# Patient Record
Sex: FEMALE | Race: WHITE | Employment: FULL TIME | URBAN - METROPOLITAN AREA
[De-identification: names, ages, dates, MRNs, and addresses within clinical notes are randomized per-mention and may not be internally consistent; named-entity substitution may affect disease eponyms.]

---

## 2019-05-25 ENCOUNTER — APPOINTMENT (OUTPATIENT)
Dept: GENERAL RADIOLOGY | Age: 34
End: 2019-05-25
Payer: COMMERCIAL

## 2019-05-25 ENCOUNTER — APPOINTMENT (OUTPATIENT)
Dept: CT IMAGING | Age: 34
End: 2019-05-25
Payer: COMMERCIAL

## 2019-05-25 ENCOUNTER — HOSPITAL ENCOUNTER (OUTPATIENT)
Age: 34
Setting detail: OBSERVATION
Discharge: LEFT BEFORE DISCHARGE | End: 2019-05-26
Attending: EMERGENCY MEDICINE | Admitting: INTERNAL MEDICINE
Payer: COMMERCIAL

## 2019-05-25 DIAGNOSIS — R65.10 SIRS (SYSTEMIC INFLAMMATORY RESPONSE SYNDROME) (HCC): ICD-10-CM

## 2019-05-25 DIAGNOSIS — B34.9 VIRAL ILLNESS: Primary | ICD-10-CM

## 2019-05-25 PROBLEM — R11.0 NAUSEA: Status: ACTIVE | Noted: 2019-05-25

## 2019-05-25 PROBLEM — M79.10 MYALGIA: Status: ACTIVE | Noted: 2019-05-25

## 2019-05-25 PROBLEM — R50.9 FEVER AND CHILLS: Status: ACTIVE | Noted: 2019-05-25

## 2019-05-25 PROBLEM — M25.50 POLYARTHRALGIA: Status: ACTIVE | Noted: 2019-05-25

## 2019-05-25 PROBLEM — R51.9 HEADACHE: Status: ACTIVE | Noted: 2019-05-25

## 2019-05-25 LAB
A/G RATIO: 1.1 (ref 1.1–2.2)
ALBUMIN SERPL-MCNC: 4 G/DL (ref 3.4–5)
ALP BLD-CCNC: 96 U/L (ref 40–129)
ALT SERPL-CCNC: 104 U/L (ref 10–40)
AMPHETAMINE SCREEN, URINE: NORMAL
ANION GAP SERPL CALCULATED.3IONS-SCNC: 10 MMOL/L (ref 3–16)
AST SERPL-CCNC: 57 U/L (ref 15–37)
BARBITURATE SCREEN URINE: NORMAL
BASOPHILS ABSOLUTE: 0 K/UL (ref 0–0.2)
BASOPHILS RELATIVE PERCENT: 0.1 %
BENZODIAZEPINE SCREEN, URINE: NORMAL
BILIRUB SERPL-MCNC: 0.5 MG/DL (ref 0–1)
BILIRUBIN URINE: NEGATIVE
BLOOD, URINE: NEGATIVE
BUN BLDV-MCNC: 7 MG/DL (ref 7–20)
CALCIUM SERPL-MCNC: 9.3 MG/DL (ref 8.3–10.6)
CANNABINOID SCREEN URINE: NORMAL
CHLORIDE BLD-SCNC: 105 MMOL/L (ref 99–110)
CLARITY: ABNORMAL
CO2: 23 MMOL/L (ref 21–32)
COCAINE METABOLITE SCREEN URINE: NORMAL
COLOR: YELLOW
CREAT SERPL-MCNC: 0.7 MG/DL (ref 0.6–1.1)
EOSINOPHILS ABSOLUTE: 0 K/UL (ref 0–0.6)
EOSINOPHILS RELATIVE PERCENT: 0.5 %
EPITHELIAL CELLS, UA: 4 /HPF (ref 0–5)
GFR AFRICAN AMERICAN: >60
GFR NON-AFRICAN AMERICAN: >60
GLOBULIN: 3.5 G/DL
GLUCOSE BLD-MCNC: 82 MG/DL (ref 70–99)
GLUCOSE URINE: NEGATIVE MG/DL
HCT VFR BLD CALC: 42.3 % (ref 36–48)
HEMOGLOBIN: 14.3 G/DL (ref 12–16)
HYALINE CASTS: 2 /LPF (ref 0–8)
KETONES, URINE: NEGATIVE MG/DL
LACTIC ACID: 0.8 MMOL/L (ref 0.4–2)
LEUKOCYTE ESTERASE, URINE: ABNORMAL
LIPASE: 18 U/L (ref 13–60)
LYMPHOCYTES ABSOLUTE: 1.5 K/UL (ref 1–5.1)
LYMPHOCYTES RELATIVE PERCENT: 18.7 %
Lab: NORMAL
MCH RBC QN AUTO: 31 PG (ref 26–34)
MCHC RBC AUTO-ENTMCNC: 33.8 G/DL (ref 31–36)
MCV RBC AUTO: 91.9 FL (ref 80–100)
METHADONE SCREEN, URINE: NORMAL
MICROSCOPIC EXAMINATION: YES
MONO TEST: NEGATIVE
MONOCYTES ABSOLUTE: 0.8 K/UL (ref 0–1.3)
MONOCYTES RELATIVE PERCENT: 9.9 %
NEUTROPHILS ABSOLUTE: 5.7 K/UL (ref 1.7–7.7)
NEUTROPHILS RELATIVE PERCENT: 70.8 %
NITRITE, URINE: NEGATIVE
OPIATE SCREEN URINE: NORMAL
OXYCODONE URINE: NORMAL
PDW BLD-RTO: 13.1 % (ref 12.4–15.4)
PH UA: 8
PH UA: 8 (ref 5–8)
PHENCYCLIDINE SCREEN URINE: NORMAL
PLATELET # BLD: 156 K/UL (ref 135–450)
PMV BLD AUTO: 8.7 FL (ref 5–10.5)
POTASSIUM SERPL-SCNC: 3.6 MMOL/L (ref 3.5–5.1)
PROPOXYPHENE SCREEN: NORMAL
PROTEIN UA: NEGATIVE MG/DL
RAPID INFLUENZA  B AGN: NEGATIVE
RAPID INFLUENZA A AGN: NEGATIVE
RBC # BLD: 4.61 M/UL (ref 4–5.2)
RBC UA: 3 /HPF (ref 0–4)
SEDIMENTATION RATE, ERYTHROCYTE: 22 MM/HR (ref 0–20)
SODIUM BLD-SCNC: 138 MMOL/L (ref 136–145)
SPECIFIC GRAVITY UA: 1.01 (ref 1–1.03)
TOTAL PROTEIN: 7.5 G/DL (ref 6.4–8.2)
URINE REFLEX TO CULTURE: YES
URINE TYPE: ABNORMAL
UROBILINOGEN, URINE: 1 E.U./DL
WBC # BLD: 8.1 K/UL (ref 4–11)
WBC UA: 6 /HPF (ref 0–5)

## 2019-05-25 PROCEDURE — 87801 DETECT AGNT MULT DNA AMPLI: CPT

## 2019-05-25 PROCEDURE — 85025 COMPLETE CBC W/AUTO DIFF WBC: CPT

## 2019-05-25 PROCEDURE — 87086 URINE CULTURE/COLONY COUNT: CPT

## 2019-05-25 PROCEDURE — 6370000000 HC RX 637 (ALT 250 FOR IP): Performed by: INTERNAL MEDICINE

## 2019-05-25 PROCEDURE — 80053 COMPREHEN METABOLIC PANEL: CPT

## 2019-05-25 PROCEDURE — 6370000000 HC RX 637 (ALT 250 FOR IP): Performed by: PHYSICIAN ASSISTANT

## 2019-05-25 PROCEDURE — G0378 HOSPITAL OBSERVATION PER HR: HCPCS

## 2019-05-25 PROCEDURE — 6360000002 HC RX W HCPCS: Performed by: INTERNAL MEDICINE

## 2019-05-25 PROCEDURE — 87804 INFLUENZA ASSAY W/OPTIC: CPT

## 2019-05-25 PROCEDURE — 6360000002 HC RX W HCPCS: Performed by: EMERGENCY MEDICINE

## 2019-05-25 PROCEDURE — 87040 BLOOD CULTURE FOR BACTERIA: CPT

## 2019-05-25 PROCEDURE — 83690 ASSAY OF LIPASE: CPT

## 2019-05-25 PROCEDURE — 81001 URINALYSIS AUTO W/SCOPE: CPT

## 2019-05-25 PROCEDURE — 83605 ASSAY OF LACTIC ACID: CPT

## 2019-05-25 PROCEDURE — 2580000003 HC RX 258: Performed by: INTERNAL MEDICINE

## 2019-05-25 PROCEDURE — 71046 X-RAY EXAM CHEST 2 VIEWS: CPT

## 2019-05-25 PROCEDURE — 96361 HYDRATE IV INFUSION ADD-ON: CPT

## 2019-05-25 PROCEDURE — 96374 THER/PROPH/DIAG INJ IV PUSH: CPT

## 2019-05-25 PROCEDURE — 80307 DRUG TEST PRSMV CHEM ANLYZR: CPT

## 2019-05-25 PROCEDURE — 86308 HETEROPHILE ANTIBODY SCREEN: CPT

## 2019-05-25 PROCEDURE — 96375 TX/PRO/DX INJ NEW DRUG ADDON: CPT

## 2019-05-25 PROCEDURE — 70450 CT HEAD/BRAIN W/O DYE: CPT

## 2019-05-25 PROCEDURE — 84145 PROCALCITONIN (PCT): CPT

## 2019-05-25 PROCEDURE — 85652 RBC SED RATE AUTOMATED: CPT

## 2019-05-25 PROCEDURE — 99285 EMERGENCY DEPT VISIT HI MDM: CPT

## 2019-05-25 PROCEDURE — 2580000003 HC RX 258: Performed by: PHYSICIAN ASSISTANT

## 2019-05-25 PROCEDURE — 96372 THER/PROPH/DIAG INJ SC/IM: CPT

## 2019-05-25 PROCEDURE — 86140 C-REACTIVE PROTEIN: CPT

## 2019-05-25 PROCEDURE — 87077 CULTURE AEROBIC IDENTIFY: CPT

## 2019-05-25 PROCEDURE — 6360000002 HC RX W HCPCS: Performed by: PHYSICIAN ASSISTANT

## 2019-05-25 RX ORDER — HYDROCODONE BITARTRATE AND ACETAMINOPHEN 5; 325 MG/1; MG/1
1 TABLET ORAL EVERY 6 HOURS PRN
Status: DISCONTINUED | OUTPATIENT
Start: 2019-05-25 | End: 2019-05-26 | Stop reason: HOSPADM

## 2019-05-25 RX ORDER — KETOROLAC TROMETHAMINE 30 MG/ML
30 INJECTION, SOLUTION INTRAMUSCULAR; INTRAVENOUS EVERY 6 HOURS PRN
Status: DISCONTINUED | OUTPATIENT
Start: 2019-05-25 | End: 2019-05-26 | Stop reason: HOSPADM

## 2019-05-25 RX ORDER — 0.9 % SODIUM CHLORIDE 0.9 %
1000 INTRAVENOUS SOLUTION INTRAVENOUS ONCE
Status: COMPLETED | OUTPATIENT
Start: 2019-05-25 | End: 2019-05-25

## 2019-05-25 RX ORDER — SODIUM CHLORIDE 0.9 % (FLUSH) 0.9 %
10 SYRINGE (ML) INJECTION EVERY 12 HOURS SCHEDULED
Status: DISCONTINUED | OUTPATIENT
Start: 2019-05-25 | End: 2019-05-26 | Stop reason: HOSPADM

## 2019-05-25 RX ORDER — SODIUM CHLORIDE 9 MG/ML
INJECTION, SOLUTION INTRAVENOUS CONTINUOUS
Status: DISCONTINUED | OUTPATIENT
Start: 2019-05-25 | End: 2019-05-26 | Stop reason: HOSPADM

## 2019-05-25 RX ORDER — LIDOCAINE HYDROCHLORIDE 10 MG/ML
INJECTION, SOLUTION EPIDURAL; INFILTRATION; INTRACAUDAL; PERINEURAL
Status: DISPENSED
Start: 2019-05-25 | End: 2019-05-26

## 2019-05-25 RX ORDER — LORAZEPAM 2 MG/ML
2 INJECTION INTRAMUSCULAR ONCE
Status: COMPLETED | OUTPATIENT
Start: 2019-05-25 | End: 2019-05-25

## 2019-05-25 RX ORDER — CEPHALEXIN 500 MG/1
500 CAPSULE ORAL 4 TIMES DAILY
COMMUNITY
End: 2019-05-25

## 2019-05-25 RX ORDER — KETOROLAC TROMETHAMINE 30 MG/ML
30 INJECTION, SOLUTION INTRAMUSCULAR; INTRAVENOUS ONCE
Status: COMPLETED | OUTPATIENT
Start: 2019-05-25 | End: 2019-05-25

## 2019-05-25 RX ORDER — ACETAMINOPHEN 500 MG
500 TABLET ORAL ONCE
Status: COMPLETED | OUTPATIENT
Start: 2019-05-25 | End: 2019-05-25

## 2019-05-25 RX ORDER — SODIUM CHLORIDE 0.9 % (FLUSH) 0.9 %
10 SYRINGE (ML) INJECTION PRN
Status: DISCONTINUED | OUTPATIENT
Start: 2019-05-25 | End: 2019-05-26 | Stop reason: HOSPADM

## 2019-05-25 RX ORDER — ONDANSETRON 2 MG/ML
4 INJECTION INTRAMUSCULAR; INTRAVENOUS EVERY 6 HOURS PRN
Status: DISCONTINUED | OUTPATIENT
Start: 2019-05-25 | End: 2019-05-26 | Stop reason: HOSPADM

## 2019-05-25 RX ORDER — ACETAMINOPHEN 325 MG/1
650 TABLET ORAL EVERY 4 HOURS PRN
Status: DISCONTINUED | OUTPATIENT
Start: 2019-05-25 | End: 2019-05-26 | Stop reason: HOSPADM

## 2019-05-25 RX ADMIN — SODIUM CHLORIDE: 9 INJECTION, SOLUTION INTRAVENOUS at 20:40

## 2019-05-25 RX ADMIN — HYDROCODONE BITARTRATE AND ACETAMINOPHEN 1 TABLET: 5; 325 TABLET ORAL at 22:06

## 2019-05-25 RX ADMIN — LORAZEPAM 2 MG: 2 INJECTION INTRAMUSCULAR; INTRAVENOUS at 17:53

## 2019-05-25 RX ADMIN — Medication 10 ML: at 20:29

## 2019-05-25 RX ADMIN — ACETAMINOPHEN 500 MG: 500 TABLET ORAL at 14:38

## 2019-05-25 RX ADMIN — SODIUM CHLORIDE 1000 ML: 9 INJECTION, SOLUTION INTRAVENOUS at 14:38

## 2019-05-25 RX ADMIN — SODIUM CHLORIDE 1000 ML: 9 INJECTION, SOLUTION INTRAVENOUS at 16:03

## 2019-05-25 RX ADMIN — ENOXAPARIN SODIUM 40 MG: 40 INJECTION SUBCUTANEOUS at 20:40

## 2019-05-25 RX ADMIN — KETOROLAC TROMETHAMINE 30 MG: 30 INJECTION, SOLUTION INTRAMUSCULAR at 16:03

## 2019-05-25 ASSESSMENT — PAIN DESCRIPTION - PAIN TYPE
TYPE: ACUTE PAIN
TYPE: ACUTE PAIN

## 2019-05-25 ASSESSMENT — ENCOUNTER SYMPTOMS
BLOOD IN STOOL: 0
ANAL BLEEDING: 0
BACK PAIN: 0
EYE ITCHING: 0
ABDOMINAL PAIN: 0
COUGH: 0
RECTAL PAIN: 0
EYE PAIN: 0
SHORTNESS OF BREATH: 0
VOMITING: 0
PHOTOPHOBIA: 0
CONSTIPATION: 0
ABDOMINAL DISTENTION: 0
EYE REDNESS: 0
EYE DISCHARGE: 0
STRIDOR: 0
DIARRHEA: 0
COLOR CHANGE: 0
NAUSEA: 1
ALLERGIC/IMMUNOLOGIC NEGATIVE: 1
WHEEZING: 0

## 2019-05-25 ASSESSMENT — PAIN DESCRIPTION - DESCRIPTORS: DESCRIPTORS: ACHING

## 2019-05-25 ASSESSMENT — PAIN SCALES - GENERAL
PAINLEVEL_OUTOF10: 8
PAINLEVEL_OUTOF10: 7
PAINLEVEL_OUTOF10: 7

## 2019-05-25 ASSESSMENT — PAIN DESCRIPTION - LOCATION: LOCATION: GENERALIZED

## 2019-05-25 NOTE — ED PROVIDER NOTES
m) 170 lb (77.1 kg)       Physical Exam   Constitutional: She is oriented to person, place, and time. She appears well-developed and well-nourished. No distress. HENT:   Head: Normocephalic and atraumatic. Right Ear: Hearing, tympanic membrane, external ear and ear canal normal.   Left Ear: Hearing, tympanic membrane, external ear and ear canal normal.   Nose: Nose normal.   Mouth/Throat: Oropharynx is clear and moist.   No temporal tenderness or pulsatile mass   Eyes: Pupils are equal, round, and reactive to light. Conjunctivae and EOM are normal. Right eye exhibits no discharge. Left eye exhibits no discharge. No scleral icterus. Neck: Normal range of motion. No JVD present. No Brudzinski's sign and no Kernig's sign noted. Cardiovascular: Normal rate. Pulmonary/Chest: Effort normal and breath sounds normal.   Abdominal: Soft. Bowel sounds are normal. She exhibits no distension, no abdominal bruit and no pulsatile midline mass. There is no tenderness. There is no rigidity, no rebound, no guarding, no CVA tenderness, no tenderness at McBurney's point and negative Schaffer's sign. Musculoskeletal: Normal range of motion. No extremity edema, posterior calf or thigh tenderness, palpable cord, discoloration. Negative homans. Lymphadenopathy:     She has no cervical adenopathy. Neurological: She is alert and oriented to person, place, and time. No cranial nerve deficit (II-XII Intact). Skin: Skin is warm and dry. Capillary refill takes less than 2 seconds. No rash noted. She is not diaphoretic. No erythema. No pallor. Psychiatric: She has a normal mood and affect. Her behavior is normal.   Nursing note and vitals reviewed.       DIAGNOSTIC RESULTS   LABS:    Labs Reviewed   COMPREHENSIVE METABOLIC PANEL - Abnormal; Notable for the following components:       Result Value     (*)     AST 57 (*)     All other components within normal limits    Narrative:     Performed at:  [facility]) - criteria. My suspicion is low for ACS, PE, myocarditis, pericarditis, endocarditis, acute pulmonary edema, pleural effusion, pericardial effusion, cardiac tamponade, cardiomyopathy, CHF exacerbation, thoracic aortic dissection, esophageal rupture, other life-threatening arrhythmia, hypertensive urgency or emergency, hemothorax, pulmonary contusion, subcutaneous emphysema, flail chest, pneumo mediastinum, rib fracture, pneumonia, pneumothorax, fracture or dislocation, epidural abscess or hematoma, discitis, meningitis, encephalitis, transverse myelitis, carotid dissection, sinus abscess, acute fracture, acute CVA, ICH, SAH, TIA,  encephalitis, pseudotumor cerebri, temporal arteritis, sentinel bleed from ruptured aneurysm, hypertensive urgency or emergency, subdural hematoma, epidural hematoma acute pharyngitis, peritonsillar or tonsillar abscess, retropharyngeal abscess, bacterial tracheitis, epiglottitis, meningitis, encephalitis, foreign body, angioedema, anaphylaxis, mononucleosis, mumps, lymphoma, leukemia, asthma exacerbation, osteomyelitis, mastoiditis, severe sepsis or shock, DKA, or other concerning pathology. At this time cant entirely rule out meningitis. We have high suspicion for potential viral illness. FINAL IMPRESSION      1. Viral illness    2. SIRS (systemic inflammatory response syndrome) ([facility] [address]          DISPOSITION/PLAN   DISPOSITION Admitted 05/25/2019 05:54:49 PM      PATIENT REFERREDTO:  No follow-up provider specified.     DISCHARGE MEDICATIONS:  New Prescriptions    No medications on file       DISCONTINUED MEDICATIONS:  Discontinued Medications    CEPHALEXIN (KEFLEX) 500 MG CAPSULE    Take 500 mg by mouth 4 times daily    IBUPROFEN (ADVIL;MOTRIN) 800 MG TABLET    Take 1 tablet by mouth every 8 hours as needed for Pain              (Please note that portions ofthis note were completed with a voice recognition program.  Efforts were made to edit the dictations but occasionally words are mis-transcribed.)    Maryse Dewey PA-C (electronically signed)           Maryse Dewey PA-C  05/25/19 [id number]

## 2019-05-25 NOTE — H&P
Hospital Medicine History & Physical      PCP: No primary care provider on file. Date of Admission: 5/25/2019    Date of Service: Pt seen/examined on 5/25/2019  and Placed in Observation. Chief Complaint:    Chief Complaint   Patient presents with    Fatigue     pt brought in by [facility] c/o burning and pain all over since yesterday around noon. Seen at [facility] and was told she has UTI. History Of Present Illness: The patient is a 35 y.o. female with remote history of drug abuse but has been clean for 6 years, history of hepatitis C with mildly abnormal LFTs, who has been well until yesterday when she developed rapid onset of fevers with chills and rigors and associated myalgias with generalized body aches, mild neck pain with stiffness, no photophobia, no rash, no nausea or vomiting. She was seen at [facility] ER and diagnosed with UTI started on Keflex. However symptoms persisted has presentation here today. On presentation to the ER she had a fever of 101 and was given Tylenol with Toradol with further spiking a fever to 103. She remained tachycardic at 108. She received 2 L of IV fluids with no improvement in her tachycardia. She denies any sore throats, no photophobia, no cough or production, no abdominal pains or diarrhea, no dysuria or hematuria. She reports to be in a monogamous relationship. She denies any IV drug use in the last 6 years. This x-ray is clear with no focal pathology and so is a CT of the brain. Past Medical History:        Diagnosis Date    Hepatitis C     asymptomatic    IV drug abuse ([facility] [address]     Ovarian cyst        Past Surgical History:        Procedure Laterality Date    LAPAROSCOPY  2/10/16    diagnostic laparoscopy, KTP laser of endometriosis     TUBAL LIGATION         Medications Prior to Admission:    Prior to Admission medications    Medication Sig Start Date End Date Taking?  Authorizing Provider   LITHIUM PO Take by mouth Historical Provider, MD   Divalproex Sodium (DEPAKOTE PO) Take by mouth    Historical Provider, MD   TRAZODONE HCL PO Take by mouth    Historical Provider, MD   naproxen (NAPROSYN) 500 MG tablet Take 1 tablet by mouth 2 times daily for 20 doses 6/21/18 7/1/18  SEGUNDO Zuniga   ibuprofen (ADVIL;MOTRIN) 600 MG tablet Take 1 tablet by mouth every 8 hours as needed for Pain for 20 doses. 8/12/10 8/12/11  Negro Loera MD   ibuprofen (ADVIL;MOTRIN) 600 MG tablet Take 1 tablet by mouth every 8 hours as needed for Pain for 20 doses. 6/6/10 6/6/11  Roxann Dooley MD       Allergies:  Penicillins    Social History:  The patient currently lives with boyfriend    TOBACCO:   reports that she quit smoking about 3 years ago. She smoked 0.50 packs per day. She has never used smokeless tobacco.  ETOH:   reports that she does not drink alcohol. Family History:  Reviewed in detail and negative for DM, Early CAD, Cancer, CVA. Positive as follows:        Problem Relation Age of Onset    Substance Abuse Mother     Mental Illness Mother        REVIEW OF SYSTEMS:   Positive for fevers, chills, myalgia, generalized body aches, mild neck pain, and as noted in the HPI. All other systems reviewed and negative. PHYSICAL EXAM:    /78   Pulse 108   Temp 101.3 °F (38.5 °C) (Oral)   Resp 18   Ht 5' 3\" (1.6 m)   Wt 170 lb (77.1 kg)   LMP 05/01/2019   SpO2 100%   BMI 30.11 kg/m²     General appearance: No apparent distress appears stated age and cooperative. HEENT Normal cephalic, atraumatic without obvious deformity. Pupils equal, round, and reactive to light. Extra ocular muscles intact. Conjunctivae/corneas clear. Neck: Supple, No jugular venous distention/bruits. No thyromegaly or adenopathy  Lungs: Clear to auscultation, bilaterally without Rales/Wheezes/Rhonchi with good respiratory effort.   Heart: Regular rate and rhythm with Normal S1/S2 without murmurs, rubs or gallops  Abdomen: Soft, non-tender or non-distended without rigidity or guarding and positive bowel sounds  Extremities: No clubbing, cyanosis, or edema bilaterally. Skin: Skin color, texture, turgor normal.  No rashes or lesions. Neurologic: Alert and oriented X 3, neurovascularly intact with sensory/motor intact upper extremities/lower extremities, bilaterally. Cranial nerves: II-XII intact, grossly non-focal.  Negative Kernig sign  Mental status: Alert, oriented, thought content appropriate.     CXR:  I have reviewed the CXR with the following interpretation: clear lung fields  CT brain: No acute intracranial pathology      CBC   Recent Labs     05/25/19  1439   WBC 8.1   HGB 14.3   HCT 42.3         RENAL  Recent Labs     05/25/19  1440      K 3.6      CO2 23   BUN 7   CREATININE 0.7     LFT'S  Recent Labs     05/25/19  1440   AST 57*   *   BILITOT 0.5   ALKPHOS 96     U/A:    Lab Results   Component Value Date    COLORU YELLOW 05/25/2019    WBCUA 6 05/25/2019    RBCUA 3 05/25/2019    MUCUS 1+ 07/01/2012    BACTERIA 1+ 07/01/2012    CLARITYU CLOUDY 05/25/2019    SPECGRAV 1.014 05/25/2019    LEUKOCYTESUR TRACE 05/25/2019    BLOODU Negative 05/25/2019    GLUCOSEU Negative 05/25/2019    GLUCOSEU NEGATIVE 05/29/2012         Active Hospital Problems    Diagnosis Date Noted    SIRS (systemic inflammatory response syndrome) (HCC) [R65.10] 05/25/2019    Probable Acute Viral illness [B34.9] 05/25/2019    Myalgia [M79.10] 05/25/2019    Polyarthralgia [M25.50] 05/25/2019    Fever and chills [R50.9] 05/25/2019    Nausea [R11.0] 05/25/2019    Headache [R51] 05/25/2019         ASSESSMENT/PLAN:  35 y.o. female with remote history of drug abuse but has been clean for 6 years, history of hepatitis C with mildly abnormal LFTs, who has been well until yesterday when she developed rapid onset of fevers with chills and rigors and associated myalgias with generalized body aches, mild neck pain, headache likely due to a viral illness

## 2019-05-25 NOTE — ED PROVIDER NOTES
I independently examined and evaluated John Alarcon. In brief their history revealed patient with myalgias, fever, chills. This all started yesterday. She was seen at an outside facility and diagnosed with urinary tract infection. She states she does not believe she has a urinary tract infection and she has no symptoms of 1. She denies specifically chest pain or shortness of breath but does state she is having pain all over. She does complain of a slight headache and neck pain. Their exam revealed abdomen soft, nontender nondistended. Lungs clear auscultation bilaterally. Full range of motion of the neck. No meningismal symptoms. Cranial nerves II through XII grossly intact. All diagnostic, treatment, and disposition decisions were made by myself in conjunction with the mid-level provider. Before disposition, questions were sought and answered with the patient. They have voiced understanding and agree with the plan. The patient initially presented her temperature was 101.3. After Tylenol and Toradol her temperature is actually gone up to 103. She is now tachycardia to 108. She has been given 2 L of IV fluid. Her workup at this time including head CT, chest x-ray, flu swab and blood work is all negative. Her urinalysis does not show a large sign of infection. Given the patient's headache and neck pain we did discuss a possible LP to rule out meningitis but the patient refuses. She tells me that the neck pain is not as significant as her body aches. Patient meets SIRS criteria. She is requesting admission. I have reached out to the hospitalist for evaluation and possible admission    Addendum: After patient discussed with hospitalist she did agree to an LP. I did give her 2mg of IV ativan prior to procedure. I attempted this at bedside but was unsuccessful.  I believe actually that I had the needle in the correct space but then the patient said she was going to vomit and she started to sit up so I aborted the procedure. We did relay this information to the hospitalist    The risks (including but not limited to pain, bleeding, infection and post-procedure headache) and benefits of lumbar puncture were discussed with the patient. Questions were sought and answered and consent was obtained for the procedure. Keaton Mari was placed in a seated position and leaned over a bedside table. The lumbar region was then prepped and draped in standard bedside fashion. The L3 interspace was identified with physical landmarks. The overlying skin and interspace was anesthetized with 3ml of Lidocaine 1% without epinephrine. A 20gauge spinal needle was inserted but was unsuccessful The patient tolerated the procedure well without complications and my repeat neurovascular exam post-procedure is unchanged. For all further details of the patient's emergency department visit, please see the mid-level practitioner's documentation.         Rajesh Michael MD  05/25/19 [address] [place] [address] Timothy Fermin MD  05/25/19 [id number]

## 2019-05-25 NOTE — ED NOTES
Bed: 11  Expected date:   Expected time:   Means of arrival:   Comments:  EMS     Kareen Quinteros RN  05/25/19 0458

## 2019-05-26 VITALS
RESPIRATION RATE: 16 BRPM | HEART RATE: 79 BPM | TEMPERATURE: 98 F | BODY MASS INDEX: 30.12 KG/M2 | WEIGHT: 170 LBS | OXYGEN SATURATION: 98 % | HEIGHT: 63 IN | DIASTOLIC BLOOD PRESSURE: 57 MMHG | SYSTOLIC BLOOD PRESSURE: 92 MMHG

## 2019-05-26 LAB
ANION GAP SERPL CALCULATED.3IONS-SCNC: 11 MMOL/L (ref 3–16)
BASOPHILS ABSOLUTE: 0 K/UL (ref 0–0.2)
BASOPHILS RELATIVE PERCENT: 0.1 %
BUN BLDV-MCNC: 8 MG/DL (ref 7–20)
C-REACTIVE PROTEIN: 29.7 MG/L (ref 0–5.1)
CALCIUM SERPL-MCNC: 8.2 MG/DL (ref 8.3–10.6)
CHLORIDE BLD-SCNC: 112 MMOL/L (ref 99–110)
CO2: 19 MMOL/L (ref 21–32)
CREAT SERPL-MCNC: <0.5 MG/DL (ref 0.6–1.1)
EOSINOPHILS ABSOLUTE: 0.1 K/UL (ref 0–0.6)
EOSINOPHILS RELATIVE PERCENT: 1.4 %
GFR AFRICAN AMERICAN: >60
GFR NON-AFRICAN AMERICAN: >60
GLUCOSE BLD-MCNC: 97 MG/DL (ref 70–99)
HCT VFR BLD CALC: 35.7 % (ref 36–48)
HEMOGLOBIN: 12.1 G/DL (ref 12–16)
LACTIC ACID: 0.8 MMOL/L (ref 0.4–2)
LYMPHOCYTES ABSOLUTE: 2.2 K/UL (ref 1–5.1)
LYMPHOCYTES RELATIVE PERCENT: 30.5 %
MAGNESIUM: 1.9 MG/DL (ref 1.8–2.4)
MCH RBC QN AUTO: 30.6 PG (ref 26–34)
MCHC RBC AUTO-ENTMCNC: 33.9 G/DL (ref 31–36)
MCV RBC AUTO: 90.3 FL (ref 80–100)
MONOCYTES ABSOLUTE: 0.8 K/UL (ref 0–1.3)
MONOCYTES RELATIVE PERCENT: 11.8 %
NEUTROPHILS ABSOLUTE: 4.1 K/UL (ref 1.7–7.7)
NEUTROPHILS RELATIVE PERCENT: 56.2 %
PDW BLD-RTO: 12.9 % (ref 12.4–15.4)
PLATELET # BLD: 135 K/UL (ref 135–450)
PMV BLD AUTO: 8.4 FL (ref 5–10.5)
POTASSIUM SERPL-SCNC: 3.9 MMOL/L (ref 3.5–5.1)
PROCALCITONIN: 0.15 NG/ML (ref 0–0.15)
RBC # BLD: 3.95 M/UL (ref 4–5.2)
SODIUM BLD-SCNC: 142 MMOL/L (ref 136–145)
URINE CULTURE, ROUTINE: NORMAL
WBC # BLD: 7.2 K/UL (ref 4–11)

## 2019-05-26 PROCEDURE — 83605 ASSAY OF LACTIC ACID: CPT

## 2019-05-26 PROCEDURE — 83735 ASSAY OF MAGNESIUM: CPT

## 2019-05-26 PROCEDURE — 96376 TX/PRO/DX INJ SAME DRUG ADON: CPT

## 2019-05-26 PROCEDURE — 36415 COLL VENOUS BLD VENIPUNCTURE: CPT

## 2019-05-26 PROCEDURE — 6360000002 HC RX W HCPCS: Performed by: INTERNAL MEDICINE

## 2019-05-26 PROCEDURE — 2580000003 HC RX 258: Performed by: INTERNAL MEDICINE

## 2019-05-26 PROCEDURE — 80048 BASIC METABOLIC PNL TOTAL CA: CPT

## 2019-05-26 PROCEDURE — 85025 COMPLETE CBC W/AUTO DIFF WBC: CPT

## 2019-05-26 PROCEDURE — G0378 HOSPITAL OBSERVATION PER HR: HCPCS

## 2019-05-26 RX ADMIN — SODIUM CHLORIDE: 9 INJECTION, SOLUTION INTRAVENOUS at 05:49

## 2019-05-26 RX ADMIN — KETOROLAC TROMETHAMINE 30 MG: 30 INJECTION, SOLUTION INTRAMUSCULAR at 05:49

## 2019-05-26 ASSESSMENT — PAIN SCALES - GENERAL: PAINLEVEL_OUTOF10: 6

## 2019-05-26 NOTE — PROGRESS NOTES
Admission complete. Patient alert and oriented x4, c/o generalized body aches and pains not specific to any one region. PRN pain med given see eMAR, gave towels so patient could rinse legs in warm water in shower to alleviate \"restless leg pain. \"    FYI patient would like care team to know that she has aquatic turtles at home, and she handled a sick turtle 2 days ago prior to becoming ill. Per patient turtles are known carriers of salmonella among other things. The care plan and education has been reviewed and mutually agreed upon with the patient.      Electronically signed by Nikita Soto RN on 5/25/2019 at 11:10 PM

## 2019-05-26 NOTE — DISCHARGE SUMMARY
Hospital Medicine Discharge Summary    Patient ID: New Rosas      Patient's PCP: No primary care provider on file. Admit Date: 5/25/2019     Discharge Date: 5/26/2019      Admitting Physician: Carolyn Khan MD     Discharge Physician: Carolyn Khan MD     Discharge Diagnoses: Active Hospital Problems    Diagnosis    SIRS (systemic inflammatory response syndrome) (HCC) [R65.10]    Probable Acute Viral illness [B34.9]    Myalgia [M79.10]    Polyarthralgia [M25.50]    Fever and chills [R50.9]    Nausea [R11.0]    Headache [R51]       The patient was seen and examined on day of discharge and this discharge summary is in conjunction with any daily progress note from day of discharge. Hospital Course: The patient is a 35 y.o. female with remote history of drug abuse but has been clean for 6 years, history of hepatitis C with mildly abnormal LFTs, who has been well until yesterday when she developed rapid onset of fevers with chills and rigors and associated myalgias with generalized body aches, mild neck pain, headache likely due to a viral illness complicated by positive SIRS criteria with tachycardia and fevers. She was admitted for observation with IV hydration and supportive therapy and simple pain medication. Slight elevation of ESR 22. She did fairly well overnight and remained afebrile with improved symptoms. This morning she would not wait to be seen by the attending and she left against medical advice      Physical Exam Performed:     BP (!) 92/57   Pulse 79   Temp 98 °F (36.7 °C) (Oral)   Resp 16   Ht 5' 3\" (1.6 m)   Wt 170 lb (77.1 kg)   LMP 05/01/2019   SpO2 98%   BMI 30.11 kg/m²         Labs:  For convenience and continuity at follow-up the following most recent labs are provided:      CBC:    Lab Results   Component Value Date    WBC 7.2 05/26/2019    HGB 12.1 05/26/2019    HCT 35.7 05/26/2019     05/26/2019       Renal:    Lab Results

## 2019-05-26 NOTE — PROGRESS NOTES
4 Eyes Skin Assessment     The patient is being assess for  Admission    I agree that 2 RN's have performed a thorough Head to Toe Skin Assessment on the patient. ALL assessment sites listed below have been assessed. Areas assessed by both nurses:   [x]   Head, Face, and Ears   [x]   Shoulders, Back, and Chest  [x]   Arms, Elbows, and Hands   [x]   Coccyx, Sacrum, and IschIum  [x]   Legs, Feet, and Heels        Does the Patient have Skin Breakdown?   No         Maulik Prevention initiated:  No   Wound Care Orders initiated:  No      [facility] nurse consulted for Pressure Injury (Stage 3,4, Unstageable, DTI, NWPT, and Complex wounds), New and Established Ostomies:  No      Nurse 1 eSignature: Electronically signed by Ernie Gonzáles RN on 5/26/19 at 4:18 AM    **SHARE this note so that the co-signing nurse is able to place an eSignature**    Nurse 2 eSignature: Electronically signed by David Suarez RN on 5/26/19 at 4:19 AM

## 2019-05-26 NOTE — PROGRESS NOTES
AM assessment complete. Neuro checks WDL. VSS. Patient resting in bed with call light in reach. Patient denies any needs at this time.

## 2019-05-26 NOTE — PROGRESS NOTES
Patient reports she wants to leave AMA. This RN encouraged the patient to stay and have the MD finish their assessments. Patient declined and wants to continue with the AMA process.  I informed the patient I will contact the MD.

## 2019-05-26 NOTE — PROGRESS NOTES
Patient signed the [facility] paperwork and walked out of the hospitalist. Patient stated her ride would be here in a few minutes.  Patient was last seen sitting on a bench outside the revolving doors of the main entrance of the hospital.

## 2019-05-27 NOTE — ED NOTES
Reviewed call for positive blood culture gram +cocci in clusters in 1 bottle with Dr. Darra Holter. States to wait for growth in second bottle due to probable contaminate of result.       Clark Liao RN  05/27/19 [id number]

## 2019-05-28 LAB
BLOOD CULTURE, ROUTINE: ABNORMAL
BLOOD CULTURE, ROUTINE: ABNORMAL
ORGANISM: ABNORMAL

## 2019-05-30 LAB — CULTURE, BLOOD 2: NORMAL

## 2019-07-15 ENCOUNTER — OFFICE VISIT (OUTPATIENT)
Dept: FAMILY MEDICINE CLINIC | Age: 34
End: 2019-07-15
Payer: COMMERCIAL

## 2019-07-15 VITALS
DIASTOLIC BLOOD PRESSURE: 70 MMHG | OXYGEN SATURATION: 98 % | HEIGHT: 64 IN | WEIGHT: 175.2 LBS | BODY MASS INDEX: 29.91 KG/M2 | HEART RATE: 86 BPM | SYSTOLIC BLOOD PRESSURE: 100 MMHG

## 2019-07-15 DIAGNOSIS — G25.81 RESTLESS LEG SYNDROME: ICD-10-CM

## 2019-07-15 DIAGNOSIS — Z00.00 ANNUAL PHYSICAL EXAM: Primary | ICD-10-CM

## 2019-07-15 PROCEDURE — 99385 PREV VISIT NEW AGE 18-39: CPT | Performed by: NURSE PRACTITIONER

## 2019-07-15 RX ORDER — ROPINIROLE 0.25 MG/1
0.25 TABLET, FILM COATED ORAL NIGHTLY
Qty: 30 TABLET | Refills: 0 | Status: SHIPPED | OUTPATIENT
Start: 2019-07-15 | End: 2019-07-31 | Stop reason: DRUGHIGH

## 2019-07-15 ASSESSMENT — ENCOUNTER SYMPTOMS
ABDOMINAL PAIN: 0
CHEST TIGHTNESS: 0
EYE ITCHING: 0
COUGH: 0
ABDOMINAL DISTENTION: 0
SHORTNESS OF BREATH: 0
RHINORRHEA: 0
EYE DISCHARGE: 0
EYE PAIN: 0
SORE THROAT: 0
EYE REDNESS: 0

## 2019-07-15 ASSESSMENT — PATIENT HEALTH QUESTIONNAIRE - PHQ9
SUM OF ALL RESPONSES TO PHQ QUESTIONS 1-9: 0
2. FEELING DOWN, DEPRESSED OR HOPELESS: 0
1. LITTLE INTEREST OR PLEASURE IN DOING THINGS: 0
SUM OF ALL RESPONSES TO PHQ QUESTIONS 1-9: 0
SUM OF ALL RESPONSES TO PHQ9 QUESTIONS 1 & 2: 0

## 2019-07-15 NOTE — PROGRESS NOTES
Psychiatric/Behavioral: Positive for sleep disturbance (assoicates with RLS). Negative for agitation, dysphoric mood and self-injury. The patient is not nervous/anxious. No current outpatient medications on file prior to visit. No current facility-administered medications on file prior to visit.          Allergies   Allergen Reactions    Penicillins Hives       Past Medical History:   Diagnosis Date    Hepatitis C     asymptomatic    IV drug abuse ([facility] [address]     Ovarian cyst        Past Surgical History:   Procedure Laterality Date    LAPAROSCOPY  2/10/16    diagnostic laparoscopy, KTP laser of endometriosis     LAPAROSCOPY      for endometriosis    TUBAL LIGATION         Social History     Socioeconomic History    Marital status: Legally      Spouse name: Not on file    Number of children: Not on file    Years of education: Not on file    Highest education level: Not on file   Occupational History    Not on file   Social Needs    Financial resource strain: Not on file    Food insecurity:     Worry: Not on file     Inability: Not on file    Transportation needs:     Medical: Not on file     Non-medical: Not on file   Tobacco Use    Smoking status: Former Smoker     Packs/day: 0.50     Last attempt to quit: 12/15/2018     Years since quittin.6    Smokeless tobacco: Never Used    Tobacco comment: e- cigarette only now   Substance and Sexual Activity    Alcohol use: No    Drug use: Yes     Types: IV     Comment: Been clean since 2013    Sexual activity: Not on file   Lifestyle    Physical activity:     Days per week: Not on file     Minutes per session: Not on file    Stress: Not on file   Relationships    Social connections:     Talks on phone: Not on file     Gets together: Not on file     Attends Jain service: Not on file     Active member of club or organization: Not on file     Attends meetings of clubs or organizations: Not on file     Relationship status: Not on file    Intimate partner violence:     Fear of current or ex partner: Not on file     Emotionally abused: Not on file     Physically abused: Not on file     Forced sexual activity: Not on file   Other Topics Concern    Not on file   Social History Narrative    Not on file        Family History   Problem Relation Age of Onset    Substance Abuse Mother     Mental Illness Mother     Heart Disease Maternal Grandmother     Emphysema Maternal Grandmother     Diabetes Maternal Grandmother     Heart Disease Paternal Grandmother     Diabetes Paternal Grandfather        /70 (Site: Right Upper Arm, Position: Sitting, Cuff Size: Medium Adult)   Pulse 86   Ht 5' 3.5\" (1.613 m)   Wt 175 lb 3.2 oz (79.5 kg)   LMP 07/01/2019   SpO2 98%   BMI 30.55 kg/m²        Estimated body mass index is 30.55 kg/m² as calculated from the following:    Height as of this encounter: 5' 3.5\" (1.613 m). Weight as of this encounter: 175 lb 3.2 oz (79.5 kg). Physical Exam   Constitutional: She is oriented to person, place, and time. She appears well-developed and well-nourished. No distress. HENT:   Head: Normocephalic and atraumatic. Right Ear: Hearing, tympanic membrane, external ear and ear canal normal.   Left Ear: Hearing, tympanic membrane, external ear and ear canal normal.   Nose: Nose normal. No mucosal edema. Mouth/Throat: Uvula is midline, oropharynx is clear and moist and mucous membranes are normal. Tonsils are 1+ on the right. Tonsils are 1+ on the left. No tonsillar exudate. Eyes: Pupils are equal, round, and reactive to light. Conjunctivae and EOM are normal. Right eye exhibits no discharge. Left eye exhibits no discharge. Neck: Normal range of motion. No tracheal deviation present. No thyromegaly present. Cardiovascular: Normal rate, regular rhythm, S1 normal, S2 normal and normal heart sounds. Pulmonary/Chest: Effort normal and breath sounds normal. No respiratory distress. Abdominal: Soft. Bowel sounds are normal. She exhibits no distension. Musculoskeletal: Normal range of motion. She exhibits no edema. Lymphadenopathy:     She has no cervical adenopathy. Neurological: She is alert and oriented to person, place, and time. Skin: Skin is warm and dry. Capillary refill takes less than 2 seconds. No rash noted. Psychiatric: She has a normal mood and affect. Judgment normal.       ASSESSMENT/PLAN:  1. Annual physical exam  Continue with healthy diet and lifestyle changes. Continue with focus on recovery. Complete baseline blood work. - Lipid Panel; Future  - Hemoglobin A1C; Future    2. Restless leg syndrome  Initiate ropinirole 0.25mg QHS. Will continue to monitor and adjust dosage as needed. Complete baseline blood work. - rOPINIRole (REQUIP) 0.25 MG tablet; Take 1 tablet by mouth nightly  Dispense: 30 tablet; Refill: 0  - Vitamin B12; Future  - Vitamin D 25 Hydroxy; Future  - T4, Free; Future  - TSH without Reflex; Future     Current Outpatient Medications   Medication Sig Dispense Refill    rOPINIRole (REQUIP) 0.25 MG tablet Take 1 tablet by mouth nightly 30 tablet 0    Multiple Vitamins-Minerals (MULTIVITAMIN WITH MINERALS) tablet Take 1 tablet by mouth daily 90 tablet 3     No current facility-administered medications for this visit. Health Maintenance Due   Topic Date Due    Varicella Vaccine (1 of 2 - 13+ 2-dose series) 08/12/1998    HIV screen  08/12/2000    DTaP/Tdap/Td vaccine (1 - Tdap) 08/12/2004     She verbalized understanding of instructions and counseling. Return in about 4 weeks (around 8/12/2019) for RLS follow up. An  electronic signature was used to authenticate this note.     --Eliot Weldon, APRN - CNP on 7/31/2019 at 9:01 AM

## 2019-07-15 NOTE — PATIENT INSTRUCTIONS
relief from symptoms when they get regular exercise, eat well, and avoid caffeine, alcohol, and tobacco.  Follow-up care is a key part of your treatment and safety. Be sure to make and go to all appointments, and call your doctor if you are having problems. It's also a good idea to know your test results and keep a list of the medicines you take. How can you care for yourself at home? · Take your medicines exactly as prescribed. Call your doctor if you think you are having a problem with your medicine. · Try bathing in hot or cold water. Applying a heating pad or ice bag to your legs may also help symptoms. · Stretch and massage your legs before bed or when discomfort begins. · Get some exercise for at least 30 minutes a day on most days of the week. Stop exercising at least 3 hours before bedtime. · Try to plan for situations where you will need to remain seated for long stretches. For example, if you are traveling by car, plan some stops so you can get out and walk around. · Tell your doctor about any medicines you are taking. This includes all over-the-counter, prescription, and herbal medicines. Some medicines, such as antidepressants, antihistamines, and cold and sinus medicines, can make your symptoms worse. · Avoid caffeine products, such as coffee, tea, cola, and chocolate. Caffeine can interrupt your sleep and stimulate you. · Do not smoke. Nicotine can make restless legs worse. If you need help quitting, talk to your doctor about stop-smoking programs and medicines. These can increase your chances of quitting for good. · Do not drink alcohol late in the evening. Take steps to help you sleep better  · Get plenty of sunlight in the outdoors, particularly later in the afternoon. · Use the evening hours for settling down. Avoid activities that challenge you in the hours before bedtime. · Eat meals at regular times, and do not snack before bedtime. · Keep your bedroom quiet, dark, and cool.  Try

## 2019-07-16 ENCOUNTER — TELEPHONE (OUTPATIENT)
Dept: FAMILY MEDICINE CLINIC | Age: 34
End: 2019-07-16

## 2019-07-16 DIAGNOSIS — Z00.00 ANNUAL PHYSICAL EXAM: Primary | ICD-10-CM

## 2019-07-16 DIAGNOSIS — G25.81 RESTLESS LEG SYNDROME: ICD-10-CM

## 2019-07-16 DIAGNOSIS — Z00.00 ANNUAL PHYSICAL EXAM: ICD-10-CM

## 2019-07-16 LAB
CHOLESTEROL, TOTAL: 138 MG/DL (ref 0–199)
HDLC SERPL-MCNC: 47 MG/DL (ref 40–60)
LDL CHOLESTEROL CALCULATED: 78 MG/DL
T4 FREE: 1.1 NG/DL (ref 0.9–1.8)
TRIGL SERPL-MCNC: 64 MG/DL (ref 0–150)
TSH SERPL DL<=0.05 MIU/L-ACNC: 1 UIU/ML (ref 0.27–4.2)
VITAMIN B-12: 353 PG/ML (ref 211–911)
VITAMIN D 25-HYDROXY: 35.9 NG/ML
VLDLC SERPL CALC-MCNC: 13 MG/DL

## 2019-07-16 NOTE — TELEPHONE ENCOUNTER
Patient started on Requip yesterday for sleep and was told to report today if the medicine helped. She states it did not, she still didn't sleep.

## 2019-07-17 LAB
ESTIMATED AVERAGE GLUCOSE: 99.7 MG/DL
HBA1C MFR BLD: 5.1 %

## 2019-07-17 RX ORDER — MULTIVIT-MIN/IRON FUM/FOLIC AC 7.5 MG-4
1 TABLET ORAL DAILY
Qty: 90 TABLET | Refills: 3 | Status: SHIPPED | OUTPATIENT
Start: 2019-07-17 | End: 2020-09-14

## 2019-07-31 ENCOUNTER — TELEPHONE (OUTPATIENT)
Dept: PRIMARY CARE CLINIC | Age: 34
End: 2019-07-31

## 2019-07-31 DIAGNOSIS — G25.81 RESTLESS LEG SYNDROME: ICD-10-CM

## 2019-07-31 RX ORDER — ROPINIROLE 0.25 MG/1
0.25 TABLET, FILM COATED ORAL NIGHTLY
Qty: 30 TABLET | Refills: 0 | OUTPATIENT
Start: 2019-07-31

## 2019-07-31 RX ORDER — ROPINIROLE 0.5 MG/1
0.5 TABLET, FILM COATED ORAL EVERY EVENING
Qty: 30 TABLET | Refills: 0 | Status: SHIPPED | OUTPATIENT
Start: 2019-07-31 | End: 2019-08-12 | Stop reason: SDUPTHER

## 2019-07-31 ASSESSMENT — ENCOUNTER SYMPTOMS
NAUSEA: 0
VOMITING: 0
DIARRHEA: 0

## 2019-07-31 NOTE — TELEPHONE ENCOUNTER
Attempted to make follow up phone call to patient - unavailable - left voicemail with office call back number. Increased dosage to 0.5mg in the evening. Patient has follow up appointment in 2 weeks to discuss.

## 2019-08-12 ENCOUNTER — OFFICE VISIT (OUTPATIENT)
Dept: FAMILY MEDICINE CLINIC | Age: 34
End: 2019-08-12
Payer: COMMERCIAL

## 2019-08-12 VITALS
DIASTOLIC BLOOD PRESSURE: 78 MMHG | BODY MASS INDEX: 29.78 KG/M2 | SYSTOLIC BLOOD PRESSURE: 110 MMHG | OXYGEN SATURATION: 98 % | WEIGHT: 170.8 LBS | HEART RATE: 85 BPM

## 2019-08-12 DIAGNOSIS — B35.3 TINEA PEDIS OF LEFT FOOT: ICD-10-CM

## 2019-08-12 DIAGNOSIS — L50.1 IDIOPATHIC URTICARIA: ICD-10-CM

## 2019-08-12 DIAGNOSIS — G25.81 RESTLESS LEG SYNDROME: Primary | ICD-10-CM

## 2019-08-12 PROCEDURE — 1036F TOBACCO NON-USER: CPT | Performed by: NURSE PRACTITIONER

## 2019-08-12 PROCEDURE — G8417 CALC BMI ABV UP PARAM F/U: HCPCS | Performed by: NURSE PRACTITIONER

## 2019-08-12 PROCEDURE — 99214 OFFICE O/P EST MOD 30 MIN: CPT | Performed by: NURSE PRACTITIONER

## 2019-08-12 PROCEDURE — G8427 DOCREV CUR MEDS BY ELIG CLIN: HCPCS | Performed by: NURSE PRACTITIONER

## 2019-08-12 RX ORDER — KETOCONAZOLE 20 MG/G
CREAM TOPICAL
Qty: 30 G | Refills: 3 | Status: SHIPPED | OUTPATIENT
Start: 2019-08-12 | End: 2020-09-14 | Stop reason: CLARIF

## 2019-08-12 RX ORDER — ROPINIROLE 0.5 MG/1
0.5 TABLET, FILM COATED ORAL EVERY EVENING
Qty: 90 TABLET | Refills: 0 | Status: SHIPPED | OUTPATIENT
Start: 2019-08-12 | End: 2020-09-29 | Stop reason: SDUPTHER

## 2019-08-12 RX ORDER — FLUCONAZOLE 150 MG/1
150 TABLET ORAL WEEKLY
Qty: 4 TABLET | Refills: 0 | Status: SHIPPED | OUTPATIENT
Start: 2019-08-12 | End: 2019-09-11

## 2019-08-12 ASSESSMENT — ENCOUNTER SYMPTOMS
VOMITING: 0
COUGH: 0
DIARRHEA: 0
SHORTNESS OF BREATH: 0
NAUSEA: 0

## 2019-08-12 NOTE — PATIENT INSTRUCTIONS
to your doctor about stop-smoking programs and medicines. These can increase your chances of quitting for good. · Do not drink alcohol late in the evening. Take steps to help you sleep better  · Get plenty of sunlight in the outdoors, particularly later in the afternoon. · Use the evening hours for settling down. Avoid activities that challenge you in the hours before bedtime. · Eat meals at regular times, and do not snack before bedtime. · Keep your bedroom quiet, dark, and cool. Try using a sleep mask and earplugs to help you sleep. · Limit how much you drink at night to reduce your need to get up to urinate. But do not go to bed thirsty. · Run a fan or other steady \"white noise\" during the night if noises wake you up. · Campbell the bed for sleeping and sex. Do your reading or TV watching in another room. · Once you are in bed, relax from head to toe, and guide your mind to pleasant thoughts. · Do not stay in bed longer than 8 hours, and try to avoid naps. When should you call for help? Watch closely for changes in your health, and be sure to contact your doctor if:    · You are still not getting enough sleep.     · Your symptoms become more severe or happen more often. Where can you learn more? Go to [email or web address]://[email or web address]. org and sign in to your [employer] account. Enter W020 in the [facility] box to learn more about \"Restless Legs Syndrome: Care Instructions. \"     If you do not have an account, please click on the \"Sign Up Now\" link. Current as of: March 28, 2019  Content Version: 12.1  © 6293-5135 [employer]wise, Incorporated. Care instructions adapted under license by [facility] ([facility]). If you have questions about a medical condition or this instruction, always ask your healthcare professional. [place] [address] any warranty or liability for your use of this information.          Patient Education        Athlete's Foot: Care Instructions  Your Care

## 2019-08-16 ENCOUNTER — TELEPHONE (OUTPATIENT)
Dept: FAMILY MEDICINE CLINIC | Age: 34
End: 2019-08-16

## 2019-08-16 DIAGNOSIS — N92.6 MENSTRUAL PERIOD LATE: Primary | ICD-10-CM

## 2019-08-16 DIAGNOSIS — N92.6 MENSTRUAL PERIOD LATE: ICD-10-CM

## 2019-08-16 LAB — GONADOTROPIN, CHORIONIC (HCG) QUANT: <5 MIU/ML

## 2019-08-16 NOTE — TELEPHONE ENCOUNTER
Patient informed that orders are placed. She will go shortly and then wait for a call from [facility] with results.

## 2019-08-25 ENCOUNTER — APPOINTMENT (OUTPATIENT)
Dept: GENERAL RADIOLOGY | Age: 34
End: 2019-08-25
Payer: COMMERCIAL

## 2019-08-25 ENCOUNTER — HOSPITAL ENCOUNTER (EMERGENCY)
Age: 34
Discharge: HOME | End: 2019-08-25
Payer: COMMERCIAL

## 2019-08-25 VITALS
HEIGHT: 63 IN | RESPIRATION RATE: 18 BRPM | WEIGHT: 170 LBS | DIASTOLIC BLOOD PRESSURE: 56 MMHG | HEART RATE: 82 BPM | SYSTOLIC BLOOD PRESSURE: 117 MMHG | OXYGEN SATURATION: 99 % | BODY MASS INDEX: 30.12 KG/M2 | TEMPERATURE: 98.2 F

## 2019-08-25 DIAGNOSIS — M79.672 LEFT FOOT PAIN: Primary | ICD-10-CM

## 2019-08-25 DIAGNOSIS — B35.3 TINEA PEDIS OF LEFT FOOT: ICD-10-CM

## 2019-08-25 PROCEDURE — 73630 X-RAY EXAM OF FOOT: CPT

## 2019-08-25 PROCEDURE — 99283 EMERGENCY DEPT VISIT LOW MDM: CPT

## 2019-08-25 PROCEDURE — 6370000000 HC RX 637 (ALT 250 FOR IP): Performed by: PHYSICIAN ASSISTANT

## 2019-08-25 RX ORDER — IBUPROFEN 600 MG/1
600 TABLET ORAL EVERY 6 HOURS PRN
Qty: 30 TABLET | Refills: 0 | Status: SHIPPED | OUTPATIENT
Start: 2019-08-25 | End: 2019-11-07

## 2019-08-25 RX ORDER — IBUPROFEN 600 MG/1
600 TABLET ORAL ONCE
Status: COMPLETED | OUTPATIENT
Start: 2019-08-25 | End: 2019-08-25

## 2019-08-25 RX ADMIN — IBUPROFEN 600 MG: 600 TABLET ORAL at 22:03

## 2019-08-25 ASSESSMENT — PAIN SCALES - GENERAL
PAINLEVEL_OUTOF10: 8
PAINLEVEL_OUTOF10: 8

## 2019-08-25 ASSESSMENT — PAIN DESCRIPTION - ORIENTATION: ORIENTATION: LEFT

## 2019-08-25 ASSESSMENT — PAIN DESCRIPTION - LOCATION: LOCATION: FOOT

## 2019-08-25 ASSESSMENT — PAIN DESCRIPTION - PAIN TYPE: TYPE: ACUTE PAIN

## 2019-08-26 NOTE — ED PROVIDER NOTES
**EVALUATED BY ADVANCED PRACTICE PROVIDER**        [address] [facility]      Pt Name: Pelon Campo  DPA:[id number]  Lilo 1985  Date of evaluation: 8/25/2019  Provider: Edmond Hughes PA-C      Chief Complaint:    Chief Complaint   Patient presents with    Foot Pain     Patient presents to ER with complaints of left foot pain since Firday. No known injury. States she has been using topical medication for athlete's foot. Nursing Notes, Past Medical Hx, Past Surgical Hx, Social Hx, Allergies, and Family Hx were all reviewed and agreed with or any disagreements were addressed in the HPI.    HPI:  (Location, Duration, Timing, Severity,Quality, Assoc Sx, Context, Modifying factors)  This is a  29 y.o. female the presents to the emergency department with a chief complaint of left foot pain. Patient states this began 2 days ago without injury or trauma. She states that she gets this shooting pain comes from the bottom of her toes that shoots into her heel. Denies any previous history of injuries or surgeries. She is currently on antifungal cream and Diflucan for athlete's foot for the past 3 weeks. She is been following up with her family physician for this. She denies any history of diabetes, wound, color change, numbness, nausea, vomiting, fevers or any other symptoms. PastMedical/Surgical History:      Diagnosis Date    Hepatitis C     asymptomatic    IV drug abuse ([facility] [address]     Ovarian cyst          Procedure Laterality Date    LAPAROSCOPY  2/10/16    diagnostic laparoscopy, KTP laser of endometriosis     LAPAROSCOPY  2017    for endometriosis    TUBAL LIGATION         Medications:  Discharge Medication List as of 8/25/2019 10:37 PM      CONTINUE these medications which have NOT CHANGED    Details   ketoconazole (NIZORAL) 2 % cream Apply topically daily. , Disp-30 g, R-3, Normal      fluconazole (DIFLUCAN) 150 MG tablet Take are read by the radiologist. Josh Blanco PA-C have directly visualized the radiologic plain film image(s) with the below findings:        Interpretation per the Radiologist below, if available at the time of thisnote:    XR FOOT LEFT (MIN 3 VIEWS)   Final Result   No acute osseous abnormality. Xr Foot Left (min 3 Views)    Result Date: 8/25/2019  EXAMINATION: THREE XRAY VIEWS OF THE LEFT FOOT 8/25/2019 9:59 pm COMPARISON: None. HISTORY: ORDERING SYSTEM PROVIDED HISTORY: pain TECHNOLOGIST PROVIDED HISTORY: Reason for exam:->pain FINDINGS: There is no evidence of acute fracture. There is normal alignment of the tarsometatarsal joints. No acute joint abnormality. No focal osseous lesion. No focal soft tissue abnormality. No acute osseous abnormality. MEDICAL DECISION MAKING / ED COURSE:      PROCEDURES:   Procedures    None    Patient was given:  Medications   ibuprofen (ADVIL;MOTRIN) tablet 600 mg (600 mg Oral Given 8/25/19 2203)       Patient presented with left foot pain. X-ray imaging is unremarkable. She is distally neurovascular intact. She does have findings of some mild tinea pedis and is currently on treatment for this. Will not change any further treatment for this at this time. No evidence of abscess, cellulitis, arterial occlusion, compartment syndrome or other emergent etiology. Will be treated symptomatically and referred to podiatry. Do not believe any further work-up or testing is warranted at this time. She understood her instructions at discharge and was stable discharge. The patient tolerated their visit well. I evaluated the patient. The physician was available for consultation as needed. The patient and / or the family were informed of the results of anytests, a time was given to answer questions, a plan was proposed and they agreed with plan. CLINICAL IMPRESSION:  1. Left foot pain    2.  Tinea pedis of left foot        DISPOSITION Decision

## 2019-10-15 ENCOUNTER — PATIENT MESSAGE (OUTPATIENT)
Dept: FAMILY MEDICINE CLINIC | Age: 34
End: 2019-10-15

## 2019-10-17 ENCOUNTER — OFFICE VISIT (OUTPATIENT)
Dept: FAMILY MEDICINE CLINIC | Age: 34
End: 2019-10-17
Payer: COMMERCIAL

## 2019-10-17 VITALS
SYSTOLIC BLOOD PRESSURE: 92 MMHG | BODY MASS INDEX: 31.6 KG/M2 | DIASTOLIC BLOOD PRESSURE: 70 MMHG | WEIGHT: 178.4 LBS | OXYGEN SATURATION: 98 % | TEMPERATURE: 99.2 F | HEART RATE: 80 BPM

## 2019-10-17 DIAGNOSIS — R32 URINARY INCONTINENCE, UNSPECIFIED TYPE: Primary | ICD-10-CM

## 2019-10-17 LAB
BACTERIA URINE, POC: 0
BILIRUBIN URINE: 0 MG/DL
BLOOD, URINE: NEGATIVE
CASTS URINE, POC: 0
CLARITY: ABNORMAL
COLOR: YELLOW
CRYSTALS URINE, POC: 0
EPI CELLS URINE, POC: 0
GLUCOSE URINE: NEGATIVE
KETONES, URINE: NEGATIVE
LEUKOCYTE EST, POC: ABNORMAL
NITRITE, URINE: NEGATIVE
PH UA: 7 (ref 4.5–8)
PROTEIN UA: NEGATIVE
RBC URINE, POC: 0
SPECIFIC GRAVITY UA: 1.02 (ref 1–1.03)
UROBILINOGEN, URINE: NORMAL
WBC URINE, POC: 0
YEAST URINE, POC: 0

## 2019-10-17 PROCEDURE — G8417 CALC BMI ABV UP PARAM F/U: HCPCS | Performed by: NURSE PRACTITIONER

## 2019-10-17 PROCEDURE — G8427 DOCREV CUR MEDS BY ELIG CLIN: HCPCS | Performed by: NURSE PRACTITIONER

## 2019-10-17 PROCEDURE — 99213 OFFICE O/P EST LOW 20 MIN: CPT | Performed by: NURSE PRACTITIONER

## 2019-10-17 PROCEDURE — 1036F TOBACCO NON-USER: CPT | Performed by: NURSE PRACTITIONER

## 2019-10-17 PROCEDURE — G8484 FLU IMMUNIZE NO ADMIN: HCPCS | Performed by: NURSE PRACTITIONER

## 2019-10-17 PROCEDURE — 81000 URINALYSIS NONAUTO W/SCOPE: CPT | Performed by: NURSE PRACTITIONER

## 2019-10-17 ASSESSMENT — ENCOUNTER SYMPTOMS
NAUSEA: 0
DIARRHEA: 0
SHORTNESS OF BREATH: 0
VOMITING: 0
COUGH: 0

## 2019-11-07 ENCOUNTER — PATIENT MESSAGE (OUTPATIENT)
Dept: FAMILY MEDICINE CLINIC | Age: 34
End: 2019-11-07

## 2019-11-07 DIAGNOSIS — S39.012A BACK STRAIN, INITIAL ENCOUNTER: Primary | ICD-10-CM

## 2019-11-07 RX ORDER — IBUPROFEN 800 MG/1
800 TABLET ORAL EVERY 8 HOURS PRN
Qty: 30 TABLET | Refills: 0 | Status: SHIPPED | OUTPATIENT
Start: 2019-11-07 | End: 2020-09-14

## 2020-03-17 ENCOUNTER — PATIENT MESSAGE (OUTPATIENT)
Dept: FAMILY MEDICINE CLINIC | Age: 35
End: 2020-03-17

## 2020-03-18 ENCOUNTER — PATIENT MESSAGE (OUTPATIENT)
Dept: FAMILY MEDICINE CLINIC | Age: 35
End: 2020-03-18

## 2020-03-18 RX ORDER — HYDROXYZINE PAMOATE 25 MG/1
25 CAPSULE ORAL 3 TIMES DAILY PRN
Qty: 90 CAPSULE | Refills: 0 | Status: SHIPPED | OUTPATIENT
Start: 2020-03-18 | End: 2020-04-17

## 2020-03-18 NOTE — TELEPHONE ENCOUNTER
From: Brian Tabares  To: LINA Leija CNP  Sent: 3/18/2020 9:57 AM EDT  Subject: Prescription Question    Thank my pup so very much! Please stay safe!      ----- Message -----   From:LINA Heredia CNP   Sent:3/18/2020 9:57 AM EDT   To:Claribel Myers   Subject:RE: Prescription Question    Claribel,    This is not a problem. Prescription sent to [facility] in [place] on [address]. Thanks,  LINA Dasilva      ----- Message -----   From:Claribel Manning   Sent:3/17/2020 6:14 PM EDT   To:LINA Heredia CNP   Subject:Prescription Question    Jackie Jc! I have a question. ... I have been having panic attacks the past few days! Not being able to breathe heart racing etc... I know it has to do with everything going on in the world! 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 I have a prescription for Vistaril but it's from 2016. Is there anyway you could call in a new one? Please get back with me as soon as you can I'm trying to avoid doctors and hospital besides my job!  Thank you very much

## 2020-03-18 NOTE — TELEPHONE ENCOUNTER
From: Duong Gonzales  To: Geraldine Kelly, APRN - CNP  Sent: 3/17/2020 6:14 PM EDT  Subject: Prescription Question    Jasbir Erazo! I have a question. ... I have been having panic attacks the past few days! Not being able to breathe heart racing etc... I know it has to do with everything going on in the world! 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 I have a prescription for Vistaril but it's from 2016. Is there anyway you could call in a new one? Please get back with me as soon as you can I'm trying to avoid doctors and hospital besides my job!  Thank you very much

## 2020-03-31 ENCOUNTER — PATIENT MESSAGE (OUTPATIENT)
Dept: FAMILY MEDICINE CLINIC | Age: 35
End: 2020-03-31

## 2020-03-31 NOTE — TELEPHONE ENCOUNTER
From: Deep Martin  To: Skylar Ellis, APRN - CNP  Sent: 3/31/2020 9:33 AM EDT  Subject: Non-Urgent Medical Question    Roxanne,    Good morning! Hope all is well! I never received the note via fax [phone] is the fax number! I'm here until 5 today!     Thank you,  Sherita n/a

## 2020-04-03 ENCOUNTER — PATIENT MESSAGE (OUTPATIENT)
Dept: FAMILY MEDICINE CLINIC | Age: 35
End: 2020-04-03

## 2020-04-22 ENCOUNTER — TELEPHONE (OUTPATIENT)
Dept: ADMINISTRATIVE | Age: 35
End: 2020-04-22

## 2020-07-20 ENCOUNTER — PATIENT MESSAGE (OUTPATIENT)
Dept: FAMILY MEDICINE CLINIC | Age: 35
End: 2020-07-20

## 2020-07-20 NOTE — TELEPHONE ENCOUNTER
From: Reginald Corea  To: Chely Gallegos, APRN - CNP  Sent: 7/20/2020 12:50 PM EDT  Subject: Non-Urgent Medical Question    Hello! I would like to set up an appointment for one day this week anytime after 1:30p. Thank you!

## 2020-09-14 ENCOUNTER — OFFICE VISIT (OUTPATIENT)
Dept: FAMILY MEDICINE CLINIC | Age: 35
End: 2020-09-14
Payer: COMMERCIAL

## 2020-09-14 VITALS
BODY MASS INDEX: 34.22 KG/M2 | WEIGHT: 193.2 LBS | SYSTOLIC BLOOD PRESSURE: 122 MMHG | HEART RATE: 93 BPM | DIASTOLIC BLOOD PRESSURE: 82 MMHG | OXYGEN SATURATION: 99 % | TEMPERATURE: 97.8 F

## 2020-09-14 DIAGNOSIS — F32.2 CURRENT SEVERE EPISODE OF MAJOR DEPRESSIVE DISORDER WITHOUT PSYCHOTIC FEATURES WITHOUT PRIOR EPISODE (HCC): ICD-10-CM

## 2020-09-14 LAB
A/G RATIO: 1.3 (ref 1.1–2.2)
ALBUMIN SERPL-MCNC: 4.3 G/DL (ref 3.4–5)
ALP BLD-CCNC: 104 U/L (ref 40–129)
ALT SERPL-CCNC: 244 U/L (ref 10–40)
ANION GAP SERPL CALCULATED.3IONS-SCNC: 12 MMOL/L (ref 3–16)
AST SERPL-CCNC: 180 U/L (ref 15–37)
BASOPHILS ABSOLUTE: 0 K/UL (ref 0–0.2)
BASOPHILS RELATIVE PERCENT: 0.3 %
BILIRUB SERPL-MCNC: 0.3 MG/DL (ref 0–1)
BUN BLDV-MCNC: 9 MG/DL (ref 7–20)
CALCIUM SERPL-MCNC: 9.7 MG/DL (ref 8.3–10.6)
CHLORIDE BLD-SCNC: 103 MMOL/L (ref 99–110)
CO2: 24 MMOL/L (ref 21–32)
CREAT SERPL-MCNC: 0.6 MG/DL (ref 0.6–1.1)
EOSINOPHILS ABSOLUTE: 0.3 K/UL (ref 0–0.6)
EOSINOPHILS RELATIVE PERCENT: 4.5 %
GFR AFRICAN AMERICAN: >60
GFR NON-AFRICAN AMERICAN: >60
GLOBULIN: 3.4 G/DL
GLUCOSE FASTING: 83 MG/DL (ref 70–99)
HCT VFR BLD CALC: 44.7 % (ref 36–48)
HEMOGLOBIN: 14.9 G/DL (ref 12–16)
LYMPHOCYTES ABSOLUTE: 2.4 K/UL (ref 1–5.1)
LYMPHOCYTES RELATIVE PERCENT: 38.4 %
MCH RBC QN AUTO: 31 PG (ref 26–34)
MCHC RBC AUTO-ENTMCNC: 33.4 G/DL (ref 31–36)
MCV RBC AUTO: 92.8 FL (ref 80–100)
MONOCYTES ABSOLUTE: 0.4 K/UL (ref 0–1.3)
MONOCYTES RELATIVE PERCENT: 6 %
NEUTROPHILS ABSOLUTE: 3.2 K/UL (ref 1.7–7.7)
NEUTROPHILS RELATIVE PERCENT: 50.8 %
PDW BLD-RTO: 13.7 % (ref 12.4–15.4)
PLATELET # BLD: 193 K/UL (ref 135–450)
PMV BLD AUTO: 9.1 FL (ref 5–10.5)
POTASSIUM SERPL-SCNC: 4.4 MMOL/L (ref 3.5–5.1)
RBC # BLD: 4.81 M/UL (ref 4–5.2)
SODIUM BLD-SCNC: 139 MMOL/L (ref 136–145)
TOTAL PROTEIN: 7.7 G/DL (ref 6.4–8.2)
TSH REFLEX: 1.09 UIU/ML (ref 0.27–4.2)
WBC # BLD: 6.2 K/UL (ref 4–11)

## 2020-09-14 PROCEDURE — 99214 OFFICE O/P EST MOD 30 MIN: CPT | Performed by: NURSE PRACTITIONER

## 2020-09-14 PROCEDURE — G0444 DEPRESSION SCREEN ANNUAL: HCPCS | Performed by: NURSE PRACTITIONER

## 2020-09-14 RX ORDER — BUSPIRONE HYDROCHLORIDE 10 MG/1
10 TABLET ORAL 2 TIMES DAILY
Qty: 60 TABLET | Refills: 0 | Status: SHIPPED | OUTPATIENT
Start: 2020-09-14 | End: 2020-10-14

## 2020-09-14 RX ORDER — HYDROXYZINE PAMOATE 25 MG/1
25 CAPSULE ORAL 3 TIMES DAILY PRN
COMMUNITY
End: 2021-11-09

## 2020-09-14 ASSESSMENT — ENCOUNTER SYMPTOMS
SHORTNESS OF BREATH: 0
NAUSEA: 0
CONSTIPATION: 0
WHEEZING: 0
DIARRHEA: 0
VOMITING: 0
CHEST TIGHTNESS: 1

## 2020-09-14 ASSESSMENT — PATIENT HEALTH QUESTIONNAIRE - PHQ9
1. LITTLE INTEREST OR PLEASURE IN DOING THINGS: 3
7. TROUBLE CONCENTRATING ON THINGS, SUCH AS READING THE NEWSPAPER OR WATCHING TELEVISION: 3
3. TROUBLE FALLING OR STAYING ASLEEP: 3
2. FEELING DOWN, DEPRESSED OR HOPELESS: 3
6. FEELING BAD ABOUT YOURSELF - OR THAT YOU ARE A FAILURE OR HAVE LET YOURSELF OR YOUR FAMILY DOWN: 0
SUM OF ALL RESPONSES TO PHQ QUESTIONS 1-9: 21
SUM OF ALL RESPONSES TO PHQ9 QUESTIONS 1 & 2: 6
SUM OF ALL RESPONSES TO PHQ QUESTIONS 1-9: 21
5. POOR APPETITE OR OVEREATING: 3
8. MOVING OR SPEAKING SO SLOWLY THAT OTHER PEOPLE COULD HAVE NOTICED. OR THE OPPOSITE, BEING SO FIGETY OR RESTLESS THAT YOU HAVE BEEN MOVING AROUND A LOT MORE THAN USUAL: 3
9. THOUGHTS THAT YOU WOULD BE BETTER OFF DEAD, OR OF HURTING YOURSELF: 0
4. FEELING TIRED OR HAVING LITTLE ENERGY: 3
10. IF YOU CHECKED OFF ANY PROBLEMS, HOW DIFFICULT HAVE THESE PROBLEMS MADE IT FOR YOU TO DO YOUR WORK, TAKE CARE OF THINGS AT HOME, OR GET ALONG WITH OTHER PEOPLE: 2

## 2020-09-14 ASSESSMENT — COLUMBIA-SUICIDE SEVERITY RATING SCALE - C-SSRS
2. HAVE YOU ACTUALLY HAD ANY THOUGHTS OF KILLING YOURSELF?: NO
1. WITHIN THE PAST MONTH, HAVE YOU WISHED YOU WERE DEAD OR WISHED YOU COULD GO TO SLEEP AND NOT WAKE UP?: NO
6. HAVE YOU EVER DONE ANYTHING, STARTED TO DO ANYTHING, OR PREPARED TO DO ANYTHING TO END YOUR LIFE?: NO

## 2020-09-14 NOTE — PROGRESS NOTES
Cassidy Hancock  : 1985  Encounter date: 2020    This cecilia 28 y.o. female who presents with  Chief Complaint   Patient presents with    Depression     Patient presents today to discuss depression and anxiety. Patient states at the beginning of Sept states she constantly feels like something bad is going to happen. History of present illness:    HPI Pt is 28year old female that reports increased anxiety and depression. Screened positive for depression. Pt reports difficulty sleeping, unable to fall asleep. Pt reports interrupted appetite and excessive eating. Pt reports crying more, reports chest pressure. Reports increased isolation, turning away from things she once enjoyed. Pt reports history PTSD. 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 Pt with financial concerns. Pt reports history of schizophrenia, bipolar, reports seeing psychologist, but not in therapy. Reports history of taking lithium, seroquel, and prozac. Current Outpatient Medications on File Prior to Visit   Medication Sig Dispense Refill    hydrOXYzine (VISTARIL) 25 MG capsule Take 25 mg by mouth 3 times daily as needed for Itching      rOPINIRole (REQUIP) 0.5 MG tablet Take 1 tablet by mouth every evening 90 tablet 0     No current facility-administered medications on file prior to visit.        Allergies   Allergen Reactions    Penicillins Hives     Past Medical History:   Diagnosis Date    Hepatitis C     asymptomatic    IV drug abuse ([facility] [address]     Ovarian cyst       Past Surgical History:   Procedure Laterality Date    LAPAROSCOPY  2/10/16    diagnostic laparoscopy, KTP laser of endometriosis     LAPAROSCOPY      for endometriosis    TUBAL LIGATION        Family History   Problem Relation Age of Onset    Substance Abuse Mother     Mental Illness Mother     Heart Disease Maternal Grandmother     Emphysema Maternal Grandmother     Diabetes Maternal Grandmother     Heart Disease Paternal Grandmother     Diabetes Paternal Grandfather       Social History     Tobacco Use    Smoking status: Current Every Day Smoker     Packs/day: 0.25     Last attempt to quit: 12/15/2018     Years since quittin.7    Smokeless tobacco: Never Used    Tobacco comment: e- cigarette only now   Substance Use Topics    Alcohol use: No        Review of Systems   Constitutional: Positive for activity change, appetite change and fatigue. Negative for unexpected weight change. Respiratory: Positive for chest tightness. Negative for shortness of breath and wheezing. Cardiovascular: Negative for chest pain, palpitations and leg swelling. Gastrointestinal: Negative for constipation, diarrhea, nausea and vomiting. Musculoskeletal: Negative for myalgias. Neurological: Positive for headaches. Psychiatric/Behavioral: Positive for agitation, dysphoric mood and sleep disturbance. Negative for self-injury and suicidal ideas. The patient is nervous/anxious. The patient is not hyperactive. Objective:    /82 (Site: Right Upper Arm, Position: Sitting, Cuff Size: Medium Adult)   Pulse 93   Temp 97.8 °F (36.6 °C)   Wt 193 lb 3.2 oz (87.6 kg)   LMP 2020   SpO2 99%   BMI 34.22 kg/m²   Weight: 193 lb 3.2 oz (87.6 kg)     BP Readings from Last 3 Encounters:   20 122/82   10/17/19 92/70   19 (!) 117/56     Wt Readings from Last 3 Encounters:   20 193 lb 3.2 oz (87.6 kg)   10/17/19 178 lb 6.4 oz (80.9 kg)   19 170 lb (77.1 kg)     BMI Readings from Last 3 Encounters:   20 34.22 kg/m²   10/17/19 31.60 kg/m²   19 30.11 kg/m²       Physical Exam  Vitals signs reviewed. Constitutional:       Appearance: Normal appearance. She is well-developed. She is obese. Eyes:      Extraocular Movements: Extraocular movements intact. Pupils: Pupils are equal, round, and reactive to light. Neck:      Musculoskeletal: Neck supple. No muscular tenderness. Cardiovascular:      Rate and Rhythm: Normal rate and regular rhythm. Heart sounds: Normal heart sounds. No murmur. Pulmonary:      Effort: Pulmonary effort is normal.      Breath sounds: Normal breath sounds. Abdominal:      General: Bowel sounds are normal.      Palpations: Abdomen is soft. Tenderness: There is no abdominal tenderness. Musculoskeletal:      Right lower leg: No edema. Left lower leg: No edema. Lymphadenopathy:      Cervical: No cervical adenopathy. Skin:     General: Skin is warm and dry. Neurological:      General: No focal deficit present. Mental Status: She is alert and oriented to person, place, and time. Psychiatric:         Mood and Affect: Mood normal.         Assessment/Plan    1. Generalized anxiety disorder  Discussed social support systems  Advised healthy lifestyle choices  Discussed psychotherapy  Stress relieving tactics  - busPIRone (BUSPAR) 10 MG tablet; Take 1 tablet by mouth 2 times daily  Dispense: 60 tablet; Refill: 0    2. Current severe episode of major depressive disorder without psychotic features without prior episode (HCC)  - busPIRone (BUSPAR) 10 MG tablet; Take 1 tablet by mouth 2 times daily  Dispense: 60 tablet; Refill: 0  - CBC Auto Differential; Future  - Comprehensive Metabolic Panel, Fasting; Future  - TSH with Reflex; Future      Return in about 1 month (around 10/14/2020) for lab review, medication re-check. This dictation was generated by voice recognition computer software. Although all attempts are made to edit the dictation for accuracy, there may be errors in the transcription that are not intended.

## 2020-09-28 ENCOUNTER — PATIENT MESSAGE (OUTPATIENT)
Dept: FAMILY MEDICINE CLINIC | Age: 35
End: 2020-09-28

## 2020-09-29 RX ORDER — ROPINIROLE 0.5 MG/1
0.5 TABLET, FILM COATED ORAL EVERY EVENING
Qty: 90 TABLET | Refills: 0 | Status: SHIPPED | OUTPATIENT
Start: 2020-09-29 | End: 2021-11-09

## 2021-07-29 ENCOUNTER — APPOINTMENT (OUTPATIENT)
Dept: GENERAL RADIOLOGY | Age: 36
End: 2021-07-29

## 2021-07-29 ENCOUNTER — HOSPITAL ENCOUNTER (EMERGENCY)
Age: 36
Discharge: HOME | End: 2021-07-29

## 2021-07-29 VITALS
SYSTOLIC BLOOD PRESSURE: 132 MMHG | HEART RATE: 73 BPM | OXYGEN SATURATION: 96 % | RESPIRATION RATE: 14 BRPM | HEIGHT: 63 IN | DIASTOLIC BLOOD PRESSURE: 64 MMHG | BODY MASS INDEX: 31.71 KG/M2 | WEIGHT: 179 LBS | TEMPERATURE: 98.4 F

## 2021-07-29 DIAGNOSIS — R07.89 MUSCULOSKELETAL CHEST PAIN: Primary | ICD-10-CM

## 2021-07-29 DIAGNOSIS — R74.01 TRANSAMINITIS: ICD-10-CM

## 2021-07-29 DIAGNOSIS — Z86.19 HISTORY OF HEPATITIS C: ICD-10-CM

## 2021-07-29 LAB
A/G RATIO: 1.2 (ref 1.1–2.2)
ALBUMIN SERPL-MCNC: 4.3 G/DL (ref 3.4–5)
ALP BLD-CCNC: 121 U/L (ref 40–129)
ALT SERPL-CCNC: 185 U/L (ref 10–40)
ANION GAP SERPL CALCULATED.3IONS-SCNC: 14 MMOL/L (ref 3–16)
AST SERPL-CCNC: 220 U/L (ref 15–37)
BACTERIA: ABNORMAL /HPF
BASE EXCESS VENOUS: 0.1 MMOL/L (ref -3–3)
BASOPHILS ABSOLUTE: 0 K/UL (ref 0–0.2)
BASOPHILS RELATIVE PERCENT: 0.3 %
BILIRUB SERPL-MCNC: 0.4 MG/DL (ref 0–1)
BILIRUBIN URINE: NEGATIVE
BLOOD, URINE: NEGATIVE
BUN BLDV-MCNC: 11 MG/DL (ref 7–20)
CALCIUM SERPL-MCNC: 10.6 MG/DL (ref 8.3–10.6)
CARBOXYHEMOGLOBIN: 2.2 % (ref 0–1.5)
CHLORIDE BLD-SCNC: 102 MMOL/L (ref 99–110)
CLARITY: ABNORMAL
CO2: 22 MMOL/L (ref 21–32)
COLOR: YELLOW
CREAT SERPL-MCNC: 0.7 MG/DL (ref 0.6–1.1)
EOSINOPHILS ABSOLUTE: 0.2 K/UL (ref 0–0.6)
EOSINOPHILS RELATIVE PERCENT: 3.5 %
EPITHELIAL CELLS, UA: 4 /HPF (ref 0–5)
GFR AFRICAN AMERICAN: >60
GFR NON-AFRICAN AMERICAN: >60
GLOBULIN: 3.7 G/DL
GLUCOSE BLD-MCNC: 98 MG/DL (ref 70–99)
GLUCOSE URINE: NEGATIVE MG/DL
HCO3 VENOUS: 23.6 MMOL/L (ref 23–29)
HCT VFR BLD CALC: 43.3 % (ref 36–48)
HEMOGLOBIN: 14.6 G/DL (ref 12–16)
HYALINE CASTS: 2 /LPF (ref 0–8)
KETONES, URINE: NEGATIVE MG/DL
LEUKOCYTE ESTERASE, URINE: NEGATIVE
LYMPHOCYTES ABSOLUTE: 2.4 K/UL (ref 1–5.1)
LYMPHOCYTES RELATIVE PERCENT: 33.9 %
MCH RBC QN AUTO: 30.4 PG (ref 26–34)
MCHC RBC AUTO-ENTMCNC: 33.7 G/DL (ref 31–36)
MCV RBC AUTO: 90.4 FL (ref 80–100)
METHEMOGLOBIN VENOUS: 0.4 %
MICROSCOPIC EXAMINATION: YES
MONOCYTES ABSOLUTE: 0.5 K/UL (ref 0–1.3)
MONOCYTES RELATIVE PERCENT: 7.7 %
NEUTROPHILS ABSOLUTE: 3.8 K/UL (ref 1.7–7.7)
NEUTROPHILS RELATIVE PERCENT: 54.6 %
NITRITE, URINE: NEGATIVE
O2 CONTENT, VEN: 21 VOL %
O2 SAT, VEN: 100 %
O2 THERAPY: ABNORMAL
PCO2, VEN: 34.4 MMHG (ref 40–50)
PDW BLD-RTO: 13.6 % (ref 12.4–15.4)
PH UA: 7 (ref 5–8)
PH VENOUS: 7.44 (ref 7.35–7.45)
PLATELET # BLD: 189 K/UL (ref 135–450)
PMV BLD AUTO: 8.5 FL (ref 5–10.5)
PO2, VEN: 163 MMHG (ref 25–40)
POTASSIUM REFLEX MAGNESIUM: 4.1 MMOL/L (ref 3.5–5.1)
PRO-BNP: 38 PG/ML (ref 0–124)
PROTEIN UA: NEGATIVE MG/DL
RBC # BLD: 4.79 M/UL (ref 4–5.2)
RBC UA: 2 /HPF (ref 0–4)
SODIUM BLD-SCNC: 138 MMOL/L (ref 136–145)
SPECIFIC GRAVITY UA: 1.01 (ref 1–1.03)
TCO2 CALC VENOUS: 55 MMOL/L
TOTAL PROTEIN: 8 G/DL (ref 6.4–8.2)
TROPONIN: <0.01 NG/ML
URINE REFLEX TO CULTURE: ABNORMAL
URINE TYPE: ABNORMAL
UROBILINOGEN, URINE: 0.2 E.U./DL
WBC # BLD: 7 K/UL (ref 4–11)
WBC UA: 1 /HPF (ref 0–5)

## 2021-07-29 PROCEDURE — 36415 COLL VENOUS BLD VENIPUNCTURE: CPT

## 2021-07-29 PROCEDURE — 71045 X-RAY EXAM CHEST 1 VIEW: CPT

## 2021-07-29 PROCEDURE — 6360000002 HC RX W HCPCS: Performed by: PHYSICIAN ASSISTANT

## 2021-07-29 PROCEDURE — 93005 ELECTROCARDIOGRAM TRACING: CPT | Performed by: STUDENT IN AN ORGANIZED HEALTH CARE EDUCATION/TRAINING PROGRAM

## 2021-07-29 PROCEDURE — 82803 BLOOD GASES ANY COMBINATION: CPT

## 2021-07-29 PROCEDURE — 96372 THER/PROPH/DIAG INJ SC/IM: CPT

## 2021-07-29 PROCEDURE — 85025 COMPLETE CBC W/AUTO DIFF WBC: CPT

## 2021-07-29 PROCEDURE — 84484 ASSAY OF TROPONIN QUANT: CPT

## 2021-07-29 PROCEDURE — 99283 EMERGENCY DEPT VISIT LOW MDM: CPT

## 2021-07-29 PROCEDURE — 80053 COMPREHEN METABOLIC PANEL: CPT

## 2021-07-29 PROCEDURE — 6370000000 HC RX 637 (ALT 250 FOR IP): Performed by: PHYSICIAN ASSISTANT

## 2021-07-29 PROCEDURE — 83880 ASSAY OF NATRIURETIC PEPTIDE: CPT

## 2021-07-29 PROCEDURE — 81001 URINALYSIS AUTO W/SCOPE: CPT

## 2021-07-29 RX ORDER — KETOROLAC TROMETHAMINE 30 MG/ML
30 INJECTION, SOLUTION INTRAMUSCULAR; INTRAVENOUS ONCE
Status: COMPLETED | OUTPATIENT
Start: 2021-07-29 | End: 2021-07-29

## 2021-07-29 RX ORDER — LIDOCAINE 4 G/G
1 PATCH TOPICAL ONCE
Status: DISCONTINUED | OUTPATIENT
Start: 2021-07-29 | End: 2021-07-29 | Stop reason: HOSPADM

## 2021-07-29 RX ORDER — KETOROLAC TROMETHAMINE 30 MG/ML
15 INJECTION, SOLUTION INTRAMUSCULAR; INTRAVENOUS ONCE
Status: DISCONTINUED | OUTPATIENT
Start: 2021-07-29 | End: 2021-07-29

## 2021-07-29 RX ORDER — METHOCARBAMOL 750 MG/1
750 TABLET, FILM COATED ORAL EVERY 8 HOURS PRN
Qty: 30 TABLET | Refills: 0 | Status: SHIPPED | OUTPATIENT
Start: 2021-07-29 | End: 2021-08-08

## 2021-07-29 RX ORDER — NAPROXEN 500 MG/1
500 TABLET ORAL 2 TIMES DAILY PRN
Qty: 20 TABLET | Refills: 0 | Status: SHIPPED | OUTPATIENT
Start: 2021-07-29 | End: 2021-11-12

## 2021-07-29 RX ADMIN — KETOROLAC TROMETHAMINE 30 MG: 30 INJECTION, SOLUTION INTRAMUSCULAR; INTRAVENOUS at 10:36

## 2021-07-29 ASSESSMENT — PAIN SCALES - GENERAL
PAINLEVEL_OUTOF10: 8
PAINLEVEL_OUTOF10: 8

## 2021-07-29 ASSESSMENT — PAIN DESCRIPTION - ORIENTATION: ORIENTATION: MID

## 2021-07-29 ASSESSMENT — ENCOUNTER SYMPTOMS
NAUSEA: 0
VOMITING: 0
DIARRHEA: 0
ABDOMINAL PAIN: 0
CHEST TIGHTNESS: 0
SHORTNESS OF BREATH: 0

## 2021-07-29 ASSESSMENT — PAIN DESCRIPTION - LOCATION: LOCATION: CHEST

## 2021-07-29 ASSESSMENT — PAIN DESCRIPTION - PAIN TYPE: TYPE: ACUTE PAIN

## 2021-07-29 NOTE — ED PROVIDER NOTES
that she tried some antacids thinking that that would help the symptoms which it did not and she also states that she took single dose of 600 mg ibuprofen which did not help. Because she is having increasing symptoms related to the above-mentioned with increasing intensity of pain that she reports to be constant since this time yesterday she presents the ED for evaluation and treatment. Twisting and bending only minimally increase the symptoms. Patient states she is not having any abdominal symptoms including GI  or gynecologic complaints. Current level of pain and discomfort is 8 out of 10 and with that she presents to the ED for evaluation and treatment. Nursing Notes were all reviewed and agreed with or any disagreements were addressed in the HPI. REVIEW OF SYSTEMS    (2-9 systems for level 4, 10 or more for level 5)     Review of Systems   Constitutional: Negative for activity change, chills and fever. Respiratory: Negative for chest tightness and shortness of breath. Cardiovascular: Positive for chest pain. Gastrointestinal: Negative for abdominal pain, diarrhea, nausea and vomiting. Genitourinary: Negative for dysuria and flank pain. Skin: Negative for rash and wound. Neurological: Negative for seizures and headaches. Positives and Pertinent negatives as per HPI. Except as noted above in the ROS, all other systems were reviewed and negative.        PAST MEDICAL HISTORY     Past Medical History:   Diagnosis Date    Hepatitis C     asymptomatic    IV drug abuse ([facility] [address]     Ovarian cyst          SURGICAL HISTORY     Past Surgical History:   Procedure Laterality Date    LAPAROSCOPY  2/10/16    diagnostic laparoscopy, KTP laser of endometriosis     LAPAROSCOPY  2017    for endometriosis    TUBAL LIGATION           CURRENTMEDICATIONS       Discharge Medication List as of 7/29/2021 11:33 AM      CONTINUE these medications which have NOT CHANGED    Details   rOPINIRole (REQUIP) 0.5 MG tablet Take 1 tablet by mouth every evening, Disp-90 tablet, R-0Normal      hydrOXYzine (VISTARIL) 25 MG capsule Take 25 mg by mouth 3 times daily as needed for ItchingHistorical Med               ALLERGIES     Penicillins    FAMILYHISTORY       Family History   Problem Relation Age of Onset    Substance Abuse Mother     Mental Illness Mother     Heart Disease Maternal Grandmother     Emphysema Maternal Grandmother     Diabetes Maternal Grandmother     Heart Disease Paternal Grandmother     Diabetes Paternal Grandfather           SOCIAL HISTORY       Social History     Tobacco Use    Smoking status: Current Every Day Smoker     Packs/day: 0.25     Last attempt to quit: 12/15/2018     Years since quittin.6    Smokeless tobacco: Never Used    Tobacco comment: e- cigarette only now   Substance Use Topics    Alcohol use: No    Drug use: Yes     Types: IV     Comment: Been clean since 2013       SCREENINGS             PHYSICAL EXAM    (up to 7 for level 4, 8 or more for level 5)     ED Triage Vitals [21 0946]   BP Temp Temp Source Pulse Resp SpO2 Height Weight   132/64 98.4 °F (36.9 °C) Oral 73 14 96 % 5' 3\" (1.6 m) 179 lb (81.2 kg)       Physical Exam  Vitals and nursing note reviewed. Constitutional:       General: She is awake. She is not in acute distress. Appearance: Normal appearance. She is well-developed. She is not ill-appearing or diaphoretic. Comments: Resting comfortably in the examination room in no evidence of acute painful distress. HENT:      Head: Normocephalic and atraumatic. Right Ear: External ear normal.      Left Ear: External ear normal.   Eyes:      General: No scleral icterus. Right eye: No discharge. Left eye: No discharge. Conjunctiva/sclera: Conjunctivae normal.   Neck:      Vascular: No JVD. Cardiovascular:      Rate and Rhythm: Normal rate and regular rhythm. Heart sounds: No murmur heard. No friction rub. No gallop. Pulmonary:      Effort: Pulmonary effort is normal. No accessory muscle usage or respiratory distress. Breath sounds: Normal breath sounds. No wheezing, rhonchi or rales. Chest:      Chest wall: Tenderness present. No mass, lacerations, deformity, swelling, crepitus or edema. There is no dullness to percussion. Abdominal:      General: There is no distension. Palpations: Abdomen is soft. Abdomen is not rigid. There is no mass. Tenderness: There is no abdominal tenderness. There is no guarding or rebound. Musculoskeletal:      Cervical back: Normal range of motion. Right lower leg: No edema. Left lower leg: No edema. Skin:     General: Skin is warm and dry. Neurological:      Mental Status: She is alert and oriented to person, place, and time. GCS: GCS eye subscore is 4. GCS verbal subscore is 5. GCS motor subscore is 6. Cranial Nerves: No cranial nerve deficit. Sensory: No sensory deficit. Coordination: Coordination normal.   Psychiatric:         Behavior: Behavior normal. Behavior is cooperative.          DIAGNOSTIC RESULTS   LABS:    Labs Reviewed   COMPREHENSIVE METABOLIC PANEL W/ REFLEX TO MG FOR LOW K - Abnormal; Notable for the following components:       Result Value     (*)      (*)     All other components within normal limits    Narrative:     Performed at:  [facility]  [address] [facility],  [address], [address]   Phone [phone]   BLOOD GAS, VENOUS - Abnormal; Notable for the following components:    pCO2, Sloan 34.4 (*)     pO2, Sloan 163.0 (*)     Carboxyhemoglobin 2.2 (*)     All other components within normal limits    Narrative:     Performed at:  [facility]  [address] [facility],  [address], [address]   Phone [phone]   URINE RT REFLEX TO CULTURE - Abnormal; Notable for the following components:    Clarity, UA CLOUDY (*)     All other components within normal limits Narrative:     Performed at:  [facility]  [address],  [place], [id number]   Phone [phone]   MICROSCOPIC URINALYSIS - Abnormal; Notable for the following components:    Bacteria, UA RARE (*)     All other components within normal limits    Narrative:     Performed at:  [facility]  [address],  [place], [id number]   Phone [phone]   CBC WITH AUTO DIFFERENTIAL    Narrative:     Performed at:  [facility] [address],  [place], [address]   Phone [phone]   TROPONIN    Narrative:     Performed at:  [facility] [address],  [place], [address]   Phone [phone]   BRAIN NATRIURETIC PEPTIDE    Narrative:     Performed at:  [facility] [address],  [place], [address]   Phone [phone]       When ordered only abnormal lab results are displayed. All other labs were within normal range or not returned as of this dictation. EKG: When ordered, EKG's are interpreted by the Emergency Department Physician in the absence of a cardiologist.  Please see their note for interpretation of EKG. RADIOLOGY:   Non-plain film images such as CT, Ultrasound and MRI are read by the radiologist. Plain radiographic images are visualized and preliminarily interpreted by the ED Provider with the below findings:        Interpretation per the Radiologist below, if available at the time of this note:    XR CHEST PORTABLE   Final Result   No evidence of acute cardiopulmonary disease. No results found.         PROCEDURES   Unless otherwise noted below, none     Procedures    CRITICAL CARE TIME   N/A    CONSULTS:  None      EMERGENCY DEPARTMENT COURSE and DIFFERENTIAL DIAGNOSIS/MDM:   Vitals:    Vitals:    07/29/21 0946   BP: 132/64   Pulse: 73   Resp: 14   Temp: 98.4 °F (36.9 °C)   TempSrc: Oral   SpO2: 96%   Weight: 179 lb (81.2 kg)   Height: 5' 3\" (1.6 m)       Patient was given the following medications:  Medications   lidocaine 4 % external patch 1 patch (1 patch Transdermal Patch Applied 7/29/21 1036)   ketorolac (TORADOL) injection 30 mg (30 mg Intramuscular Given 7/29/21 1036)           The patient's detailed history of present illness is documented as above. Upon arrival to the emergency department the patient's vital signs are as documented. The patient is noted to be hemodynamically stable and afebrile. Physical examination findings are as above. IV access was obtained laboratory testing work-up was initiated and patient symptomatology was treated as above. EKG performed upon arrival demonstrates a sinus rhythm with a rate of 71. Normal axis normal.  No evidence of acute ST elevation. Portable chest x-ray demonstrates no evidence of acute cardiopulmonary process. CBC demonstrates no evidence of leukocytosis anemia and central thrombocytopenia. BUN is 11 creatinine is 0.7 nonfasting glucose 98 electrolytes unremarkable. Bilirubin is normal in finding alkaline phosphatase is not elevated and she has chronic elevation with transaminitis with a history of hepatitis C today at 185 and 220 respectively. Troponin is less than 0.01. Because she has had constant pain and discomfort that is also noted to be reproducible I do not believe she needs a delta troponin as I do not believe that this is from a cardiac etiology. proBNP is within range and the patient's venous blood gas demonstrates a normal pH without hypercapnia or hypoxia. Ongoing care management on outpatient basis for the patient symptomatology. Strict potential instructions for return. The patient has been made aware of the signs and symptoms which would necessitate an immediate return to the emergency department and verbalizes an understanding of these signs and symptoms.   Unfortunately the time that the patient was discharged from the emergency department she was still having symptoms of pain as documented above. I explained to her that I hope the muscle relaxant will help in the old environment but because she drove a cannot give that to her here in the ED. She verbalizes an understanding and will follow up on an outpatient basis with Dr. Jacqueline Thompson her hepatitis C liver specialist as well as her primary care provider. The patients clinical picture is most consistent with a non-cardiac etiology. Multiple potential etiologies were considered. No clinical evidence at this time to suggest acute coronary syndrome, pulmonary embolism, aortic dissection, aortic aneurysm, myocarditis, pneumonia, pneumothorax or pericardial tamponade or pericarditis. I feel the patient can be safely discharged to home with outpatient follow up. I discussed this plan with the patient, who verbalized understanding and agreement with the plan. Instructions have been given for the patient to return to the ED for any worsening of the symptoms, including but not limited to increased pain, shortness of breath, abdominal pain or weakness. Patient seemed to understand the need for close follow-up and agreed to comply. FINAL IMPRESSION      1. Musculoskeletal chest pain    2. Transaminitis    3.  History of hepatitis C          DISPOSITION/PLAN   DISPOSITION: Discharged to home      PATIENT REFERRED TO:  Alonso Mills, APRN - [facility]  [address] [facility] [id number] [facility] Emergency Department  [address] [place] [address]  [phone]    If symptoms worsen      DISCHARGE MEDICATIONS:  Discharge Medication List as of 7/29/2021 11:33 AM      START taking these medications    Details   naproxen (NAPROSYN) 500 MG tablet Take 1 tablet by mouth 2 times daily as needed for Pain, Disp-20 tablet, R-0Print      methocarbamol (ROBAXIN-750) 750 MG tablet Take 1 tablet by mouth every 8 hours as needed (muscle cramps or pain), Disp-30 tablet, R-0Print DISCONTINUED MEDICATIONS:  Discharge Medication List as of 7/29/2021 11:33 AM                 (Please note that portions of this note were completed with a voice recognition program.  Efforts were made to edit the dictations but occasionally words are mis-transcribed.)    Kaylin Alvares PA-C (electronically signed)            J Luis Deluca PA-C  07/29/21 6050

## 2021-07-30 LAB
EKG ATRIAL RATE: 71 BPM
EKG DIAGNOSIS: NORMAL
EKG P AXIS: 57 DEGREES
EKG P-R INTERVAL: 136 MS
EKG Q-T INTERVAL: 372 MS
EKG QRS DURATION: 88 MS
EKG QTC CALCULATION (BAZETT): 404 MS
EKG R AXIS: 34 DEGREES
EKG T AXIS: 53 DEGREES
EKG VENTRICULAR RATE: 71 BPM

## 2021-07-30 PROCEDURE — 93010 ELECTROCARDIOGRAM REPORT: CPT | Performed by: INTERNAL MEDICINE

## 2021-09-14 ENCOUNTER — PATIENT MESSAGE (OUTPATIENT)
Dept: FAMILY MEDICINE CLINIC | Age: 36
End: 2021-09-14

## 2021-09-14 NOTE — TELEPHONE ENCOUNTER
From: Macey Warren  To: Cristi Arriaga, APRN - CNP  Sent: 9/14/2021 8:58 AM EDT  Subject: Non-Urgent Medical Question    Good morning! I would like to talk to [facility] if she could please call me as soon as possible.     Thank you,  Kan Wynne   [phone]

## 2021-09-14 NOTE — TELEPHONE ENCOUNTER
Can you call to see what she needs and I can call her back? Looks as though I have not seen her in office since 10/2019 - did she [place] about one year ago.

## 2021-09-14 NOTE — TELEPHONE ENCOUNTER
Called and spoke with patient. She states that she is needing an appointment for possible menopause. Patient is scheduled for an appointment.

## 2021-11-09 ENCOUNTER — HOSPITAL ENCOUNTER (EMERGENCY)
Age: 36
Discharge: HOME | End: 2021-11-09
Attending: EMERGENCY MEDICINE
Payer: COMMERCIAL

## 2021-11-09 VITALS
OXYGEN SATURATION: 97 % | HEART RATE: 65 BPM | TEMPERATURE: 98.2 F | WEIGHT: 177.25 LBS | BODY MASS INDEX: 30.26 KG/M2 | RESPIRATION RATE: 16 BRPM | DIASTOLIC BLOOD PRESSURE: 71 MMHG | HEIGHT: 64 IN | SYSTOLIC BLOOD PRESSURE: 104 MMHG

## 2021-11-09 DIAGNOSIS — R52 BODY ACHES: Primary | ICD-10-CM

## 2021-11-09 LAB
A/G RATIO: 1 (ref 1.1–2.2)
ALBUMIN SERPL-MCNC: 3.9 G/DL (ref 3.4–5)
ALP BLD-CCNC: 110 U/L (ref 40–129)
ALT SERPL-CCNC: 170 U/L (ref 10–40)
ANION GAP SERPL CALCULATED.3IONS-SCNC: 11 MMOL/L (ref 3–16)
AST SERPL-CCNC: 160 U/L (ref 15–37)
BASOPHILS ABSOLUTE: 0 K/UL (ref 0–0.2)
BASOPHILS RELATIVE PERCENT: 0.3 %
BILIRUB SERPL-MCNC: 0.6 MG/DL (ref 0–1)
BILIRUBIN URINE: NEGATIVE
BLOOD, URINE: NEGATIVE
BUN BLDV-MCNC: 7 MG/DL (ref 7–20)
CALCIUM SERPL-MCNC: 9.4 MG/DL (ref 8.3–10.6)
CHLORIDE BLD-SCNC: 102 MMOL/L (ref 99–110)
CLARITY: CLEAR
CO2: 25 MMOL/L (ref 21–32)
COLOR: YELLOW
CREAT SERPL-MCNC: 0.6 MG/DL (ref 0.6–1.1)
EOSINOPHILS ABSOLUTE: 0.2 K/UL (ref 0–0.6)
EOSINOPHILS RELATIVE PERCENT: 3.5 %
GFR AFRICAN AMERICAN: >60
GFR NON-AFRICAN AMERICAN: >60
GLUCOSE BLD-MCNC: 117 MG/DL (ref 70–99)
GLUCOSE URINE: NEGATIVE MG/DL
HCG(URINE) PREGNANCY TEST: NEGATIVE
HCT VFR BLD CALC: 41.6 % (ref 36–48)
HEMOGLOBIN: 14.1 G/DL (ref 12–16)
KETONES, URINE: NEGATIVE MG/DL
LEUKOCYTE ESTERASE, URINE: NEGATIVE
LIPASE: 34 U/L (ref 13–60)
LYMPHOCYTES ABSOLUTE: 1.7 K/UL (ref 1–5.1)
LYMPHOCYTES RELATIVE PERCENT: 33.6 %
MCH RBC QN AUTO: 30.6 PG (ref 26–34)
MCHC RBC AUTO-ENTMCNC: 33.9 G/DL (ref 31–36)
MCV RBC AUTO: 90.4 FL (ref 80–100)
MICROSCOPIC EXAMINATION: ABNORMAL
MONOCYTES ABSOLUTE: 0.4 K/UL (ref 0–1.3)
MONOCYTES RELATIVE PERCENT: 7.2 %
NEUTROPHILS ABSOLUTE: 2.8 K/UL (ref 1.7–7.7)
NEUTROPHILS RELATIVE PERCENT: 55.4 %
NITRITE, URINE: NEGATIVE
PDW BLD-RTO: 13.1 % (ref 12.4–15.4)
PH UA: 6 (ref 5–8)
PLATELET # BLD: 153 K/UL (ref 135–450)
PMV BLD AUTO: 8.9 FL (ref 5–10.5)
POTASSIUM SERPL-SCNC: 3.7 MMOL/L (ref 3.5–5.1)
PROTEIN UA: NEGATIVE MG/DL
RAPID INFLUENZA  B AGN: NEGATIVE
RAPID INFLUENZA A AGN: NEGATIVE
RBC # BLD: 4.6 M/UL (ref 4–5.2)
SARS-COV-2, NAAT: NOT DETECTED
SODIUM BLD-SCNC: 138 MMOL/L (ref 136–145)
SPECIFIC GRAVITY UA: 1.01 (ref 1–1.03)
TOTAL PROTEIN: 7.7 G/DL (ref 6.4–8.2)
URINE REFLEX TO CULTURE: ABNORMAL
URINE TYPE: ABNORMAL
UROBILINOGEN, URINE: 2 E.U./DL
WBC # BLD: 5.1 K/UL (ref 4–11)

## 2021-11-09 PROCEDURE — 99284 EMERGENCY DEPT VISIT MOD MDM: CPT

## 2021-11-09 PROCEDURE — 84703 CHORIONIC GONADOTROPIN ASSAY: CPT

## 2021-11-09 PROCEDURE — 81003 URINALYSIS AUTO W/O SCOPE: CPT

## 2021-11-09 PROCEDURE — 85025 COMPLETE CBC W/AUTO DIFF WBC: CPT

## 2021-11-09 PROCEDURE — 83690 ASSAY OF LIPASE: CPT

## 2021-11-09 PROCEDURE — 6370000000 HC RX 637 (ALT 250 FOR IP): Performed by: EMERGENCY MEDICINE

## 2021-11-09 PROCEDURE — 80053 COMPREHEN METABOLIC PANEL: CPT

## 2021-11-09 PROCEDURE — 87804 INFLUENZA ASSAY W/OPTIC: CPT

## 2021-11-09 PROCEDURE — 96374 THER/PROPH/DIAG INJ IV PUSH: CPT

## 2021-11-09 PROCEDURE — 6360000002 HC RX W HCPCS: Performed by: EMERGENCY MEDICINE

## 2021-11-09 PROCEDURE — 87635 SARS-COV-2 COVID-19 AMP PRB: CPT

## 2021-11-09 PROCEDURE — 2580000003 HC RX 258: Performed by: EMERGENCY MEDICINE

## 2021-11-09 RX ORDER — 0.9 % SODIUM CHLORIDE 0.9 %
30 INTRAVENOUS SOLUTION INTRAVENOUS ONCE
Status: COMPLETED | OUTPATIENT
Start: 2021-11-09 | End: 2021-11-09

## 2021-11-09 RX ORDER — ACETAMINOPHEN 325 MG/1
650 TABLET ORAL ONCE
Status: COMPLETED | OUTPATIENT
Start: 2021-11-09 | End: 2021-11-09

## 2021-11-09 RX ORDER — ONDANSETRON 2 MG/ML
4 INJECTION INTRAMUSCULAR; INTRAVENOUS ONCE
Status: COMPLETED | OUTPATIENT
Start: 2021-11-09 | End: 2021-11-09

## 2021-11-09 RX ADMIN — ONDANSETRON 4 MG: 2 INJECTION INTRAMUSCULAR; INTRAVENOUS at 14:15

## 2021-11-09 RX ADMIN — ACETAMINOPHEN 650 MG: 325 TABLET ORAL at 14:33

## 2021-11-09 RX ADMIN — SODIUM CHLORIDE 1641 ML: 9 INJECTION, SOLUTION INTRAVENOUS at 13:45

## 2021-11-09 ASSESSMENT — PAIN - FUNCTIONAL ASSESSMENT: PAIN_FUNCTIONAL_ASSESSMENT: 0-10

## 2021-11-09 ASSESSMENT — PAIN SCALES - GENERAL
PAINLEVEL_OUTOF10: 6
PAINLEVEL_OUTOF10: 8

## 2021-11-09 NOTE — ED NOTES
Bed: D-48  Expected date:   Expected time:   Means of arrival:   Comments:  Steve 37yo body aches      Fatemeh Porras RN  11/09/21 1942

## 2021-11-09 NOTE — ED PROVIDER NOTES
Triage Chief Complaint:   Generalized Body Aches (generalized body aches. fever, nausea, onset 3 days ago)    [place]:  Jaylan Bowens is a 39 y.o. female that presents for evaluation of generalized body aches, subjective fever, nausea x3 days. The patient does have a history of IV drug abuse but has not used any IV drugs in over 10 years she states. Denies any shortness of breath or chest pain. No change in bowel. She states her urine is \"dark\". She arrives afebrile in no acute distress. ROS:  At least 12 systems reviewed and otherwise acutely negative except as in the [address]     Past Medical History:   Diagnosis Date    Hepatitis C     asymptomatic    IV drug abuse ([facility] [address]     Ovarian cyst      Past Surgical History:   Procedure Laterality Date    LAPAROSCOPY  2/10/16    diagnostic laparoscopy, KTP laser of endometriosis     LAPAROSCOPY      for endometriosis    TUBAL LIGATION       Family History   Problem Relation Age of Onset    Substance Abuse Mother     Mental Illness Mother     Heart Disease Maternal Grandmother     Emphysema Maternal Grandmother     Diabetes Maternal Grandmother     Heart Disease Paternal Grandmother     Diabetes Paternal Grandfather      Social History     Socioeconomic History    Marital status: Legally      Spouse name: Not on file    Number of children: Not on file    Years of education: Not on file    Highest education level: Not on file   Occupational History    Not on file   Tobacco Use    Smoking status: Current Every Day Smoker     Packs/day: 0.25     Last attempt to quit: 12/15/2018     Years since quittin.9    Smokeless tobacco: Never Used    Tobacco comment: e- cigarette only now   Vaping Use    Vaping Use: Not on file   Substance and Sexual Activity    Alcohol use: No    Drug use: Not Currently     Types: IV     Comment: Been clean since 2013    Sexual activity: Not Currently   Other Topics Concern    Not on file   Social History Narrative    Not on file     Social Determinants of Health     Financial Resource Strain:     Difficulty of Paying Living Expenses: Not on file   Food Insecurity:     Worried About Running Out of Food in the Last Year: Not on file    Petr of Food in the Last Year: Not on file   Transportation Needs:     Lack of Transportation (Medical): Not on file    Lack of Transportation (Non-Medical): Not on file   Physical Activity:     Days of Exercise per Week: Not on file    Minutes of Exercise per Session: Not on file   Stress:     Feeling of Stress : Not on file   Social Connections:     Frequency of Communication with Friends and Family: Not on file    Frequency of Social Gatherings with Friends and Family: Not on file    Attends Worship Services: Not on file    Active Member of [address] [place] [address] [employer] or Organizations: Not on file    Attends Club or Organization Meetings: Not on file    Marital Status: Not on file   Intimate Partner Violence:     Fear of Current or Ex-Partner: Not on file    Emotionally Abused: Not on file    Physically Abused: Not on file    Sexually Abused: Not on file   Housing Stability:     Unable to Pay for Housing in the Last Year: Not on file    Number of [place] in the Last Year: Not on file    Unstable Housing in the Last Year: Not on file     No current facility-administered medications for this encounter. Current Outpatient Medications   Medication Sig Dispense Refill    naproxen (NAPROSYN) 500 MG tablet Take 1 tablet by mouth 2 times daily as needed for Pain 20 tablet 0     Allergies   Allergen Reactions    Penicillins Hives       [unfilled]    Nursing Notes Reviewed    Physical Exam:  Vitals:    11/09/21 1720   BP: 104/71   Pulse: 65   Resp: 16   Temp: 98.2 °F (36.8 °C)   SpO2: 97%       GENERAL APPEARANCE: Awake and alert. Cooperative. No acute distress. HEAD: Normocephalic. Atraumatic. EYES: EOM's grossly intact. Sclera anicteric.   ENT: Mucous membranes are moist. Tolerates saliva. No trismus. NECK: Supple. No meningismus. Trachea midline. HEART: RRR. Radial pulses 2+. LUNGS: Respirations unlabored. CTAB  ABDOMEN: Soft. Non-tender. No guarding or rebound. EXTREMITIES: No acute deformities. SKIN: Warm and dry. NEUROLOGICAL: No gross facial drooping. Moves all 4 extremities spontaneously. PSYCHIATRIC: Normal mood.     I have reviewed and interpreted all of the currently available lab results from this visit (if applicable):  Results for orders placed or performed during the hospital encounter of 11/09/21   Rapid influenza A/B antigens    Specimen: Nasopharyngeal   Result Value Ref Range    Rapid Influenza A Ag Negative Negative    Rapid Influenza B Ag Negative Negative   COVID-19, Rapid    Specimen: Nasopharyngeal Swab   Result Value Ref Range    SARS-CoV-2, NAAT Not Detected Not Detected   Pregnancy, Urine   Result Value Ref Range    HCG(Urine) Pregnancy Test Negative Detects HCG level >20 MIU/mL   Urine, reflex to culture    Specimen: Urine, clean catch   Result Value Ref Range    Color, UA YELLOW Straw/Yellow    Clarity, UA Clear Clear    Glucose, Ur Negative Negative mg/dL    Bilirubin Urine Negative Negative    Ketones, Urine Negative Negative mg/dL    Specific Gravity, UA 1.010 1.005 - 1.030    Blood, Urine Negative Negative    pH, UA 6.0 5.0 - 8.0    Protein, UA Negative Negative mg/dL    Urobilinogen, Urine 2.0 (A) <2.0 E.U./dL    Nitrite, Urine Negative Negative    Leukocyte Esterase, Urine Negative Negative    Microscopic Examination Not Indicated     Urine Type NotGiven     Urine Reflex to Culture Not Indicated    CBC Auto Differential   Result Value Ref Range    WBC 5.1 4.0 - 11.0 K/uL    RBC 4.60 4.00 - 5.20 M/uL    Hemoglobin 14.1 12.0 - 16.0 g/dL    Hematocrit 41.6 36.0 - 48.0 %    MCV 90.4 80.0 - 100.0 fL    MCH 30.6 26.0 - 34.0 pg    MCHC 33.9 31.0 - 36.0 g/dL    RDW 13.1 12.4 - 15.4 %    Platelets 648 333 - 504 K/uL    MPV 8.9 5.0 - 10.5 fL Neutrophils % 55.4 %    Lymphocytes % 33.6 %    Monocytes % 7.2 %    Eosinophils % 3.5 %    Basophils % 0.3 %    Neutrophils Absolute 2.8 1.7 - 7.7 K/uL    Lymphocytes Absolute 1.7 1.0 - 5.1 K/uL    Monocytes Absolute 0.4 0.0 - 1.3 K/uL    Eosinophils Absolute 0.2 0.0 - 0.6 K/uL    Basophils Absolute 0.0 0.0 - 0.2 K/uL   Comprehensive Metabolic Panel   Result Value Ref Range    Sodium 138 136 - 145 mmol/L    Potassium 3.7 3.5 - 5.1 mmol/L    Chloride 102 99 - 110 mmol/L    CO2 25 21 - 32 mmol/L    Anion Gap 11 3 - 16    Glucose 117 (H) 70 - 99 mg/dL    BUN 7 7 - 20 mg/dL    CREATININE 0.6 0.6 - 1.1 mg/dL    GFR Non-African American >60 >60    GFR African American >60 >60    Calcium 9.4 8.3 - 10.6 mg/dL    Total Protein 7.7 6.4 - 8.2 g/dL    Albumin 3.9 3.4 - 5.0 g/dL    Albumin/Globulin Ratio 1.0 (L) 1.1 - 2.2    Total Bilirubin 0.6 0.0 - 1.0 mg/dL    Alkaline Phosphatase 110 40 - 129 U/L     (H) 10 - 40 U/L     (H) 15 - 37 U/L   Lipase   Result Value Ref Range    Lipase 34.0 13.0 - 60.0 U/L        Radiographs (if obtained):  [] The following radiograph was interpreted by myself in the absence of a radiologist:  [x] Radiologist's Report Reviewed:  n/a    EKG (if obtained): (All EKG's are interpreted by myself in the absence of a cardiologist)  Initial EKG on my interpretation shows n/a    MDM:  Differential diagnosis: pregnancy, Covid, influenza, sepsis, UTI    Preg neg  Rapid Covid negative, rapid flu negative. CBC/CMP/lipase shows no emergent process. Urine shows no UTI  Patient given IV fluid. The patient's lung exam is unremarkable and she has no cough or shortness of breath  After evaluation the patient will be discharged with close follow-up. Not believe there is a role for antibiotics at this time. Discussed results, diagnosis and plan with patient and/or family. Questions addressed. Disposition and follow-up agreed upon. Specific discharge instructions explained.  The patient and/or family and I have discussed the diagnosis and risks, and we agree with discharging home to follow-up with their primary care, specialist or referral doctor. In the event that medications were prescribed the risk profile of these medications were detailed expressly. We also discussed returning to the Emergency Department immediately if new or worsening symptoms occur. We have discussed the symptoms which are most concerning that necessitate immediate return. Old records reviewed. Labs and imaging reviewed and results discussed with patient. .        Patient was given scripts for the following medications. I counseled patient how to take these medications. New Prescriptions    No medications on file         CRITICAL CARE TIME   Total Critical Care time was 0 minutes, excluding separately reportable procedures. There was a high probability of clinically significant/life threatening deterioration in the patient's condition which required my urgent intervention. Clinical Impression:  1. Body aches     2.  fatigue  (Please note that portions of this note may have been completed with a voice recognition program. Efforts were made to edit the dictations but occasionally words are mis-transcribed.)    MD Kierra Michael MD  11/09/21 9922

## 2021-11-09 NOTE — ED NOTES
D/C: Order noted for d/c. Pt confirmed d/c paperwork has correct name. Discharge and education instructions reviewed with patient. Teach-back successful. Pt verbalized understanding and signed d/c papers. Pt denied questions at this time. No acute distress noted. Patient instructed to follow-up as noted - return to emergency department if symptoms worsen. Patient verbalized understanding. Discharged per EDMD with discharge instructions. Pt discharged to private vehicle with fiance. Patient stable upon departure. Thanked patient for choosing [facility] for care. Provider aware of patient pain at time of discharge.        Mulu Bassett, RN  11/09/21 8271

## 2021-11-10 ENCOUNTER — CARE COORDINATION (OUTPATIENT)
Dept: OTHER | Facility: CLINIC | Age: 36
End: 2021-11-10

## 2021-11-10 NOTE — CARE COORDINATION
Ambulatory Care Coordination Note  11/10/2021  CM Risk Score: 0  Charlson 10 Year Mortality Risk Score: 2%     ACC: Laurette Cheadle, RN    ACM attempted to reach patient for introduction to [facility] Management related to ED visit 11/9/21. Unable to leave message, as pts phone states \"subscriber not in service\"   Will attempt to outreach patient via My Chart. Future Appointments   Date Time Provider Alicia Diaz   11/12/2021 12:00 PM Mac Slade, APRN -  [facility] MSN, RN   Ambulatory Care Manager  [facility]  Cell [phone]  [email or web address]

## 2021-11-12 ENCOUNTER — OFFICE VISIT (OUTPATIENT)
Dept: FAMILY MEDICINE CLINIC | Age: 36
End: 2021-11-12
Payer: COMMERCIAL

## 2021-11-12 VITALS
SYSTOLIC BLOOD PRESSURE: 110 MMHG | HEART RATE: 78 BPM | OXYGEN SATURATION: 98 % | BODY MASS INDEX: 31.76 KG/M2 | DIASTOLIC BLOOD PRESSURE: 72 MMHG | WEIGHT: 185 LBS | TEMPERATURE: 98.3 F

## 2021-11-12 DIAGNOSIS — M25.542 ARTHRALGIA OF BOTH HANDS: ICD-10-CM

## 2021-11-12 DIAGNOSIS — R20.2 NUMBNESS AND TINGLING IN BOTH HANDS: ICD-10-CM

## 2021-11-12 DIAGNOSIS — M79.10 MYALGIA: ICD-10-CM

## 2021-11-12 DIAGNOSIS — R20.0 NUMBNESS AND TINGLING IN BOTH HANDS: ICD-10-CM

## 2021-11-12 DIAGNOSIS — M25.541 ARTHRALGIA OF BOTH HANDS: ICD-10-CM

## 2021-11-12 DIAGNOSIS — M79.10 MYALGIA: Primary | ICD-10-CM

## 2021-11-12 PROBLEM — M25.50 POLYARTHRALGIA: Status: RESOLVED | Noted: 2019-05-25 | Resolved: 2021-11-12

## 2021-11-12 PROBLEM — R51.9 HEADACHE: Status: RESOLVED | Noted: 2019-05-25 | Resolved: 2021-11-12

## 2021-11-12 PROBLEM — R11.0 NAUSEA: Status: RESOLVED | Noted: 2019-05-25 | Resolved: 2021-11-12

## 2021-11-12 PROBLEM — R50.9 FEVER AND CHILLS: Status: RESOLVED | Noted: 2019-05-25 | Resolved: 2021-11-12

## 2021-11-12 PROBLEM — R65.10 SIRS (SYSTEMIC INFLAMMATORY RESPONSE SYNDROME) (HCC): Status: RESOLVED | Noted: 2019-05-25 | Resolved: 2021-11-12

## 2021-11-12 PROBLEM — B34.9 VIRAL ILLNESS: Status: RESOLVED | Noted: 2019-05-25 | Resolved: 2021-11-12

## 2021-11-12 LAB
A/G RATIO: 1.1 (ref 1.1–2.2)
ALBUMIN SERPL-MCNC: 4.2 G/DL (ref 3.4–5)
ALP BLD-CCNC: 109 U/L (ref 40–129)
ALT SERPL-CCNC: 156 U/L (ref 10–40)
ANION GAP SERPL CALCULATED.3IONS-SCNC: 15 MMOL/L (ref 3–16)
AST SERPL-CCNC: 124 U/L (ref 15–37)
BASOPHILS ABSOLUTE: 0 K/UL (ref 0–0.2)
BASOPHILS RELATIVE PERCENT: 0.3 %
BILIRUB SERPL-MCNC: 0.3 MG/DL (ref 0–1)
BUN BLDV-MCNC: 7 MG/DL (ref 7–20)
C-REACTIVE PROTEIN: 5 MG/L (ref 0–5.1)
CALCIUM SERPL-MCNC: 9.3 MG/DL (ref 8.3–10.6)
CHLORIDE BLD-SCNC: 100 MMOL/L (ref 99–110)
CO2: 21 MMOL/L (ref 21–32)
CREAT SERPL-MCNC: 0.6 MG/DL (ref 0.6–1.1)
EOSINOPHILS ABSOLUTE: 0.2 K/UL (ref 0–0.6)
EOSINOPHILS RELATIVE PERCENT: 2.2 %
GFR AFRICAN AMERICAN: >60
GFR NON-AFRICAN AMERICAN: >60
GLUCOSE BLD-MCNC: 77 MG/DL (ref 70–99)
HCT VFR BLD CALC: 40.9 % (ref 36–48)
HEMOGLOBIN: 13.5 G/DL (ref 12–16)
LYMPHOCYTES ABSOLUTE: 2 K/UL (ref 1–5.1)
LYMPHOCYTES RELATIVE PERCENT: 25.6 %
MCH RBC QN AUTO: 29.9 PG (ref 26–34)
MCHC RBC AUTO-ENTMCNC: 32.9 G/DL (ref 31–36)
MCV RBC AUTO: 90.7 FL (ref 80–100)
MONOCYTES ABSOLUTE: 0.4 K/UL (ref 0–1.3)
MONOCYTES RELATIVE PERCENT: 5.7 %
NEUTROPHILS ABSOLUTE: 5.2 K/UL (ref 1.7–7.7)
NEUTROPHILS RELATIVE PERCENT: 66.2 %
PDW BLD-RTO: 13.3 % (ref 12.4–15.4)
PLATELET # BLD: 181 K/UL (ref 135–450)
PMV BLD AUTO: 9.2 FL (ref 5–10.5)
POTASSIUM SERPL-SCNC: 4 MMOL/L (ref 3.5–5.1)
RBC # BLD: 4.51 M/UL (ref 4–5.2)
RHEUMATOID FACTOR: 12 IU/ML
SODIUM BLD-SCNC: 136 MMOL/L (ref 136–145)
TOTAL PROTEIN: 8 G/DL (ref 6.4–8.2)
WBC # BLD: 7.8 K/UL (ref 4–11)

## 2021-11-12 PROCEDURE — 99214 OFFICE O/P EST MOD 30 MIN: CPT | Performed by: NURSE PRACTITIONER

## 2021-11-12 RX ORDER — PREDNISONE 20 MG/1
TABLET ORAL
Qty: 18 TABLET | Refills: 0 | Status: SHIPPED | OUTPATIENT
Start: 2021-11-12 | End: 2021-12-21 | Stop reason: ALTCHOICE

## 2021-11-12 ASSESSMENT — PATIENT HEALTH QUESTIONNAIRE - PHQ9
1. LITTLE INTEREST OR PLEASURE IN DOING THINGS: 0
SUM OF ALL RESPONSES TO PHQ9 QUESTIONS 1 & 2: 0
SUM OF ALL RESPONSES TO PHQ QUESTIONS 1-9: 0
2. FEELING DOWN, DEPRESSED OR HOPELESS: 0
SUM OF ALL RESPONSES TO PHQ QUESTIONS 1-9: 0
SUM OF ALL RESPONSES TO PHQ QUESTIONS 1-9: 0

## 2021-11-12 ASSESSMENT — ENCOUNTER SYMPTOMS
DIARRHEA: 0
SHORTNESS OF BREATH: 0
NAUSEA: 0
VOMITING: 0
COUGH: 0

## 2021-11-12 NOTE — PROGRESS NOTES
Marty Jensen  : 1985  Encounter date: 2021    This is a 39 y.o. female who presents with  Chief Complaint   Patient presents with    Follow-up     Would like to talk about early menopause, bilateral hands/feet numbness/tingling for two weeks. History of present illness:    HPI   1. Presents to clinic today with concerns in regards constant body aches along with tingling in hands and feet that started approximately 2 weeks prior. Per patient symptoms have progressively worsened - has recently had to take off work because of discomfort - unable to do anything all week this week - unable to get out of bed due to pain. Biggest complaint is the legs/back/hands/wrists. Has had recent increase in stress levels - living with a family member - moved in 2021 after her rent was being increased from $700 to $1500. Unsure if stress is contributing. Denies any family hx of autoimminue disorders/rheumatoid arthritis. 2. Previously had issues with RLS - since quitting smoking that has improved with the restlessness, but having the muscle aches. 3. Is followed for Hep C - elevated liver enzymes - last visit with GI 2 years prior. 4. Patient requesting intermittent leave paperwork be completed so that she is able to take off work for doctors appointments until we are able to have a diagnosis. Allergies   Allergen Reactions    Penicillins Hives     Current Outpatient Medications   Medication Sig Dispense Refill    predniSONE (DELTASONE) 20 MG tablet Take 3 tabs for 3 days, 2 tabs for 3 days and 1 tab for 3 days 18 tablet 0     No current facility-administered medications for this visit. Review of Systems   Constitutional: Negative for activity change, appetite change, chills, fatigue and fever. Respiratory: Negative for cough and shortness of breath. Cardiovascular: Negative for chest pain and palpitations. Gastrointestinal: Negative for diarrhea, nausea and vomiting. Past medical, surgical, family and social history were reviewed and updated with the patient. Objective:    /72 (Site: Left Upper Arm, Position: Sitting, Cuff Size: Medium Adult)   Pulse 78   Temp 98.3 °F (36.8 °C)   Wt 185 lb (83.9 kg)   LMP 10/26/2021 (Approximate)   SpO2 98%   BMI 31.76 kg/m²   Weight: 185 lb (83.9 kg)     BP Readings from Last 3 Encounters:   11/12/21 110/72   11/09/21 104/71   07/29/21 132/64     Wt Readings from Last 3 Encounters:   11/12/21 185 lb (83.9 kg)   11/09/21 177 lb 4 oz (80.4 kg)   07/29/21 179 lb (81.2 kg)     Physical Exam  Constitutional:       General: She is not in acute distress. Appearance: She is well-developed. HENT:      Head: Normocephalic and atraumatic. Cardiovascular:      Rate and Rhythm: Normal rate and regular rhythm. Heart sounds: Normal heart sounds, S1 normal and S2 normal.   Pulmonary:      Effort: Pulmonary effort is normal. No respiratory distress. Breath sounds: Normal breath sounds. Skin:     General: Skin is warm and dry. Neurological:      Mental Status: She is alert and oriented to person, place, and time. Psychiatric:         Thought Content: Thought content normal.         Judgment: Judgment normal.       Assessment/Plan    1. Myalgia  Complete blood work now. With associated joint pain will start on prednisone - patient to complete blood work prior to starting on prednisone. Will make treatment plan based off test results. Patient requesting a referral to Rheumatology if all results are normal.  Patient to return to work Monday unless she calls office to extend. Will complete intermittent leave paperwork for appointments - see scanned form. Patient needs to return in the next 1-2 months to complete annual exam.   - CBC Auto Differential; Future  - C-Reactive Protein; Future  - Rheumatoid Factor; Future  - Sedimentation Rate; Future  - GENOVEVA; Future  - Vitamin B12; Future  - Vitamin D 25 Hydroxy;  Future  - Comprehensive Metabolic Panel; Future    2. Numbness and tingling in both hands  - CBC Auto Differential; Future  - C-Reactive Protein; Future  - Rheumatoid Factor; Future  - Sedimentation Rate; Future  - GENOVEVA; Future  - Vitamin B12; Future  - Vitamin D 25 Hydroxy; Future  - Comprehensive Metabolic Panel; Future    3. Arthralgia of both hands  - CBC Auto Differential; Future  - C-Reactive Protein; Future  - Rheumatoid Factor; Future  - Sedimentation Rate; Future  - GENOVEVA; Future  - Vitamin B12; Future  - Vitamin D 25 Hydroxy; Future  - predniSONE (DELTASONE) 20 MG tablet; Take 3 tabs for 3 days, 2 tabs for 3 days and 1 tab for 3 days  Dispense: 18 tablet; Refill: 0     Claribel Calixto was counseled regarding symptoms of current diagnosis, course and complications of disease if inadequately treated. Discussed side effects of medications, diagnosis, treatment options, and prognosis along with risks, benefits, complications, and alternatives of treatment including labs, imaging and other studies/treatment targets and goals. She verbalized understanding of instructions and counseling. Return if symptoms worsen or fail to improve. Medical decision making of moderate complexity.

## 2021-11-12 NOTE — LETTER
[address] [address]  Phone: [phone]  Fax: [address], APRN - CNP        November 12, 2021     Patient: Jaycee Herndon   YOB: 1985   Date of Visit: 11/12/2021       To Whom It May Concern: It is my medical opinion that Gallo Oreilly may return to work on 11/15/21. If you have any questions or concerns, please don't hesitate to call.     Sincerely,        LINA Ferris CNP

## 2021-11-12 NOTE — PATIENT INSTRUCTIONS
Patient Education        Numbness and Tingling: Care Instructions  Your Care Instructions     Many things can cause numbness or tingling. Swelling may put pressure on a nerve. This could cause you to lose feeling or have a pins-and-needles sensation on part of your body. Nerves may be damaged from trauma, toxins, or diseases, such as diabetes or multiple sclerosis (MS). Sometimes, though, the cause is not clear. If there is no clear reason for your symptoms, and you are not having any other symptoms, your doctor may suggest watching and waiting for a while to see if the numbness or tingling goes away on its own. Your doctor may want you to have blood or nerve tests to find the cause of your symptoms. Follow-up care is a key part of your treatment and safety. Be sure to make and go to all appointments, and call your doctor if you are having problems. It's also a good idea to know your test results and keep a list of the medicines you take. How can you care for yourself at home? · If your doctor prescribes medicine, take it exactly as directed. Call your doctor if you think you are having a problem with your medicine. · If you have any swelling, put ice or a cold pack on the area for 10 to 20 minutes at a time. Put a thin cloth between the ice and your skin. When should you call for help? Call 911 anytime you think you may need emergency care. For example, call if:    · You have weakness, numbness, or tingling in both legs.     · You lose bowel or bladder control.     · You have symptoms of a stroke. These may include:  ? Sudden numbness, tingling, weakness, or loss of movement in your face, arm, or leg, especially on only one side of your body. ? Sudden vision changes. ? Sudden trouble speaking. ? Sudden confusion or trouble understanding simple statements. ? Sudden problems with walking or balance. ? A sudden, severe headache that is different from past headaches.    Watch closely for changes in your [employer], and be sure to contact your doctor if you have any problems, or if:    · You do not get better as expected. Where can you learn more? Go to https://chpepiceweb.[employer]. org and sign in to your [employer] account. Enter A470 in the [employer] box to learn more about \"Numbness and Tingling: Care Instructions. \"     If you do not have an account, please click on the \"Sign Up Now\" link. Current as of: April 8, 2021               Content Version: 13.0  © 5621-9392 Healthwise, Incorporated. Care instructions adapted under license by [facility] ([facility]). If you have questions about a medical condition or this instruction, always ask your healthcare professional. [address] any warranty or liability for your use of this information.

## 2021-11-13 LAB
ANTI-NUCLEAR ANTIBODY (ANA): NEGATIVE
SEDIMENTATION RATE, ERYTHROCYTE: 18 MM/HR (ref 0–20)
VITAMIN B-12: 549 PG/ML (ref 211–911)
VITAMIN D 25-HYDROXY: 23.2 NG/ML

## 2021-11-15 ENCOUNTER — PATIENT MESSAGE (OUTPATIENT)
Dept: FAMILY MEDICINE CLINIC | Age: 36
End: 2021-11-15

## 2021-11-15 NOTE — TELEPHONE ENCOUNTER
From: Roxana Almendarez  To: Mac Lloydrows  Sent: 11/15/2021 7:31 AM EST  Subject: Friday visit     Good morning, I have been taking that medication you gave me and it seems to take some of the pain away during the day However at night my joints start to hurt again. Any suggestions? I also spoke with my manager regarding that paperwork and she asked me to let you know that the continuous leave needs to be dated from November 9 to November 14 and then the intermediate leave needs to start November 15 and thats just for any doctors appointments that I need to go to regarding whats going on with my body and my joints and making me feel this way if you have any questions at all please give me a call [phone] if I do not answer please leave a message I am back to work today. Also the paperwork needs to be filled out by Wednesday, November 17 and I need to have it turned in so if you could just let me know when I can come pick that up that would be great too thank you.

## 2021-11-18 ENCOUNTER — OFFICE VISIT (OUTPATIENT)
Dept: FAMILY MEDICINE CLINIC | Age: 36
End: 2021-11-18
Payer: COMMERCIAL

## 2021-11-18 ENCOUNTER — PATIENT MESSAGE (OUTPATIENT)
Dept: FAMILY MEDICINE CLINIC | Age: 36
End: 2021-11-18

## 2021-11-18 VITALS
SYSTOLIC BLOOD PRESSURE: 117 MMHG | TEMPERATURE: 98.2 F | DIASTOLIC BLOOD PRESSURE: 75 MMHG | HEART RATE: 69 BPM | OXYGEN SATURATION: 99 % | BODY MASS INDEX: 31.86 KG/M2 | WEIGHT: 185.6 LBS

## 2021-11-18 DIAGNOSIS — N30.01 ACUTE CYSTITIS WITH HEMATURIA: Primary | ICD-10-CM

## 2021-11-18 LAB
BILIRUBIN, POC: NORMAL
BLOOD URINE, POC: NORMAL
CLARITY, POC: NORMAL
COLOR, POC: YELLOW
GLUCOSE URINE, POC: NORMAL
KETONES, POC: NORMAL
LEUKOCYTE EST, POC: NORMAL
NITRITE, POC: NORMAL
PH, POC: 7.5
PROTEIN, POC: NORMAL
SPECIFIC GRAVITY, POC: 1.02
UROBILINOGEN, POC: 0.2

## 2021-11-18 PROCEDURE — 81003 URINALYSIS AUTO W/O SCOPE: CPT | Performed by: FAMILY MEDICINE

## 2021-11-18 PROCEDURE — 99213 OFFICE O/P EST LOW 20 MIN: CPT | Performed by: FAMILY MEDICINE

## 2021-11-18 RX ORDER — SULFAMETHOXAZOLE AND TRIMETHOPRIM 800; 160 MG/1; MG/1
1 TABLET ORAL 2 TIMES DAILY
Qty: 10 TABLET | Refills: 0 | Status: SHIPPED | OUTPATIENT
Start: 2021-11-18 | End: 2021-11-23

## 2021-11-18 NOTE — PROGRESS NOTES
Here with concerns for UTI. Symptoms started 4 days. Frequency: Yes  Urgency: Yes  Burning: Yes  Blood in urine: No, but cloudy  Abd pain: No  Back pain: Yes, but could be related to something else  Nausea: No  Vomiting: No  Bowel changes :No  Fever: No  Vaginal discharge: No  Odor: Yes    Has tried nothing for symptoms. LMP 2 weeks ago     Vitals:    11/18/21 1651   BP: 117/75   Site: Left Upper Arm   Position: Sitting   Cuff Size: Large Adult   Pulse: 69   Temp: 98.2 °F (36.8 °C)   TempSrc: Infrared   SpO2: 99%   Weight: 185 lb 9.6 oz (84.2 kg)     Wt Readings from Last 3 Encounters:   11/18/21 185 lb 9.6 oz (84.2 kg)   11/12/21 185 lb (83.9 kg)   11/09/21 177 lb 4 oz (80.4 kg)     Body mass index is 31.86 kg/m². PHQ Scores 11/12/2021 9/14/2020 7/15/2019   PHQ2 Score 0 6 0   PHQ9 Score 0 21 0           GEN: Alert and oriented x 4 NAD, affect appropriate and obese, well hydrated, well developed. No CVA tenderness  ABD: normal BS, soft, non-tender, non-distended, no masses, no rebound, no guarding, no organomegaly        ASSESSMENT AND PLAN:       Nazario Verdugo was seen today for urinary tract infection. Diagnoses and all orders for this visit:    Acute cystitis with hematuria  -     POCT Urinalysis No Micro (Auto)  -     Cancel: Culture, Urine  -     Cancel: Culture, Urine  -     Culture, Urine    Other orders  -     sulfamethoxazole-trimethoprim (BACTRIM DS;SEPTRA DS) 800-160 MG per tablet;  Take 1 tablet by mouth 2 times daily for 5 days          Portions of Note per  SAM Ricci with corrections and edits per Rebel Ceballos MD.  I agree with entirety of note and was present and performed history and physical.  I also confirm that the note above accurately reflects all work, treatment, procedures, and medical decision making performed by me, Rebel Ceballos MD

## 2021-11-18 NOTE — TELEPHONE ENCOUNTER
From: Aurelio Mane  To:  Kailash Limon  Sent: 11/18/2021 7:49 AM EST  Subject: Medication    Good morning I was wondering if that prednisone that you started me on last Friday would cause my urine to have a really strong odor its really cloudy and odor is really really strong please let me know thank you

## 2021-11-18 NOTE — PATIENT INSTRUCTIONS
Patient Education        Urinary Tract Infection (UTI) in Women: Care Instructions  Overview     A urinary tract infection, or UTI, is a general term for an infection anywhere between the kidneys and the urethra (where urine comes out). Most UTIs are bladder infections. They often cause pain or burning when you urinate. UTIs are caused by bacteria and can be cured with antibiotics. Be sure to complete your treatment so that the infection does not get worse. Follow-up care is a key part of your treatment and safety. Be sure to make and go to all appointments, and call your doctor if you are having problems. It's also a good idea to know your test results and keep a list of the medicines you take. How can you care for yourself at home? · Take your antibiotics as directed. Do not stop taking them just because you feel better. You need to take the full course of antibiotics. · Drink extra water and other fluids for the next day or two. This will help make the urine less concentrated and help wash out the bacteria that are causing the infection. (If you have kidney, heart, or liver disease and have to limit fluids, talk with your doctor before you increase the amount of fluids you drink.)  · Avoid drinks that are carbonated or have caffeine. They can irritate the bladder. · Urinate often. Try to empty your bladder each time. · To relieve pain, take a hot bath or lay a heating pad set on low over your lower belly or genital area. Never go to sleep with a heating pad in place. To prevent UTIs  · Drink plenty of water each day. This helps you urinate often, which clears bacteria from your system. (If you have kidney, heart, or liver disease and have to limit fluids, talk with your doctor before you increase the amount of fluids you drink.)  · Urinate when you need to. · If you are sexually active, urinate right after you have sex. · Change sanitary pads often.   · Avoid douches, bubble baths, feminine hygiene sprays, and other feminine hygiene products that have deodorants. · After going to the bathroom, wipe from front to back. When should you call for help? Call your doctor now or seek immediate medical care if:    · Symptoms such as fever, chills, nausea, or vomiting get worse or appear for the first time.     · You have new pain in your back just below your rib cage. This is called flank pain.     · There is new blood or pus in your urine.     · You have any problems with your antibiotic medicine. Watch closely for changes in your health, and be sure to contact your doctor if:    · You are not getting better after taking an antibiotic for 2 days.     · Your symptoms go away but then come back. Where can you learn more? Go to https://TravelPi.[employer]. org and sign in to your [employer] account. Enter X765 in the [employer].It box to learn more about \"Urinary Tract Infection (UTI) in Women: Care Instructions. \"     If you do not have an account, please click on the \"Sign Up Now\" link. Current as of: February 10, 2021               Content Version: 13.0  © 2793-6294 Healthwise, Incorporated. Care instructions adapted under license by [facility] ([facility]). If you have questions about a medical condition or this instruction, always ask your healthcare professional. [address] any warranty or liability for your use of this information.

## 2021-11-19 LAB — URINE CULTURE, ROUTINE: NORMAL

## 2021-11-22 ENCOUNTER — CARE COORDINATION (OUTPATIENT)
Dept: OTHER | Facility: CLINIC | Age: 36
End: 2021-11-22

## 2021-11-22 NOTE — CARE COORDINATION
Ambulatory Care Coordination Note  2021  CM Risk Score: 4  Charlson 10 Year Mortality Risk Score: 2%     ACC: Duke Powell, BRO    Ambulatory Care Manager [facility]) contacted the patient by telephone to introduced the Associate Care Management Program r/t ED discharge 21. Verified name and  with patient as identifiers. Patient was agreeable to enroll; ACM reviewed and updated CC Protocol, medications, goals, education and disease specific assessment. Pt states went to the ED on 21, as she was having body aches, joint pain, and dark urine, felt like she hit a brick wall; states cannot get into PCP easily due to working 8am-4:30pm. Pt reports that she followed up with PCP on 21 and was having continued joint pain; was given prednisone. PCP ordered labwork and Vit D levels were low and  liver enzymes came back elevated; pcp told her to make appt with her liver specialist at [facility], but she hasnt done so, as they wont by covered by O insurance. ACM sent pt link to finding a [facility] Provider and informational handout on Vitamin D.   Pt states then her urine was very dark again, had frequency with urination and went to pcp on 21 and was given bactrim. Pt denies any further burning with urination, but states urine is still somewhat dark. ACM encouraged drinking plenty of water throughout the day. Pt reports that she has one more day of prednisone left and joints are still hurting, clarita at night. Pt. States that pcp wants a follow up appt in a couple months and she will get that scheduled. Provided Education:  Discussed red flags and appropriate site of care based on symptoms and resources available to patient including: PCP, Specialist, Benefits related nurse triage line, Urgent care clinics, When to call 911 and [address]. Importance and benefits of: Follow up with PCP and specialist, medication adherence, self monitoring and reporting of symptoms.      Sent [facility] Vitamin D handout via My Chart. Plan:  Continue bi-weekly outreaches to provide telephonic support, education and resources as needed. Discuss / follow up on: Goal progress, urine specimen to pcp, specialist appt for elevated liver enzymes     Pt verbalized understanding and is agreeable to follow up contact. Ambulatory Care Coordination Assessment    Care Coordination Protocol  Program Enrollment: Complex Care  Referral from Primary Care Provider: No  Week 1 - Initial Assessment     Do you have all of your prescriptions and are they filled?: Yes  Barriers to medication adherence: Forgets to take  Are you able to afford your medications?: Yes  How often do you have trouble taking your medications the way you have been told to take them?: Sometimes I take them as prescribed. Do you have Home O2 Therapy?: No      Is patient able to live independently?: Yes     Current Housing: Private Residence  For whom are you the caregiver?: daughter                 Thinking about your patient's physical health needs, are there any symptoms or problems (risk indicators) you are unsure about that require further investigation?: No identified areas of uncertainly or problems already being investigated   Are the patients physical health problems impacting on their mental well-being?: No identified areas of concern   Are there any problems with your patients lifestyle behaviors (alcohol, drugs, diet, exercise) that are impacting on physical or mental well-being?: No identified areas of concern   Do you have any other concerns about your patients mental well-being?  How would you rate their severity and impact on the patient?: No identified areas of concern   How do daily activities impact on the patient's well-being? (include current or anticipated unemployment, work, caregiving, access to transportation or other): Some general dissatisfaction but no concern   How wells does the patient now understand their health and well-being (symptoms, signs or risk factors) and what they need to do to manage their health?: Reasonable to good understanding and already engages in managing health or is willing to undertake better management   How well do you think your patient can engage in healthcare discussions? (Barriers include language, deafness, aphasia, alcohol or drug problems, learning difficulties, concentration): Clear and open communication, no identified barriers   Do other services need to be involved to help this patient?: Other care/services not required at this time   Suggested Interventions and Community Resources              Goals      Conditions and Symptoms      I will schedule office visits, as directed by my provider. I will keep my appointment or reschedule if I have to cancel. I will notify my provider of any barriers to my plan of care. I will notify my provider of any symptoms that indicate a worsening of my condition. Barriers: lack of education  Plan for overcoming my barriers: Work with ACM  Confidence: 6/10  Anticipated Goal Completion Date: 12/22/21                  Prior to Admission medications    Medication Sig Start Date End Date Taking? Authorizing Provider   sulfamethoxazole-trimethoprim (BACTRIM DS;SEPTRA DS) 800-160 MG per tablet Take 1 tablet by mouth 2 times daily for 5 days 11/18/21 11/23/21 Yes Sanjay Acosta MD   predniSONE (DELTASONE) 20 MG tablet Take 3 tabs for 3 days, 2 tabs for 3 days and 1 tab for 3 days 11/12/21  Yes LINA Escalante CNP       No future appointments. Kolby WESTON, RN   Ambulatory Care Manager  Associate Care Management  Cell [phone]  [email or web address]

## 2021-12-06 ENCOUNTER — CARE COORDINATION (OUTPATIENT)
Dept: OTHER | Facility: CLINIC | Age: 36
End: 2021-12-06

## 2021-12-06 NOTE — CARE COORDINATION
Ambulatory Care Coordination Note  2021  CM Risk Score: 4  Charlson 10 Year Mortality Risk Score: 2%     ACC: Dru Moy, RN    Ambulatory Care Manager [facility]) contacted the patient by telephone to follow up on progress, discuss new issues or concerns, and reinforce/ provide patient education. Verified name and  with patient as identifiers. Pt reports that she continues to have body aches/joint pain, mostly at night. Pt states that pcp gave her another prescription for prednisone and that has been helping during the day. Pt reports that she has not yet found a [facility] specialist for her liver; pt states she has not accessed her My Chart, where ACM sent her how to find a [facility] provider. Pt agreeable to looking at this and will work on getting a [facility] provider, but pt states she is more worried about getting her daughter to all the medical specialists that she needs to see. Pt reports that she doesn't qualify for FMLA, as she has only been employed since 21 and she needs to get time off work to take her daughter to her appts. ACM provided empathetic listening to pt and will send FMLA policy to pt. Reinforced/ Provided Education:  Discussed red flags and appropriate site of care based on symptoms and resources available to patient including: PCP, Specialist, Benefits related nurse triage line, When to call 911 and [address]. Importance and benefits of: Follow up with PCP and specialist, medication adherence, self monitoring and reporting of symptoms. Importance of getting an appt with specialist.      Plan:  Continue bi weekly outreaches to provide telephonic support, education and resources as needed. Discuss / follow up on: Goal progress and appt with [facility] GI/Hepatologist.     Pt verbalized understanding and is agreeable to follow up contact.        Care Coordination Interventions    Program Enrollment: Complex Care  Referral from Primary Care Provider: No  Suggested Interventions and [employer]         Goals Addressed                 This Visit's Progress     Conditions and Symptoms   No change     I will schedule office visits, as directed by my provider. I will keep my appointment or reschedule if I have to cancel. I will notify my provider of any barriers to my plan of care. I will notify my provider of any symptoms that indicate a worsening of my condition. Barriers: lack of education  Plan for overcoming my barriers: Work with AC  Confidence: 6/10  Anticipated Goal Completion Date: 12/22/21 12/6/21 - [facility] sent pt link to [facility] on11/22, as she needs to find new liver specialist; pt had not accessed My Chart to find a [facility] provider. Pt to open message and to check on new provider before next outreach. Prior to Admission medications    Medication Sig Start Date End Date Taking? Authorizing Provider   predniSONE (DELTASONE) 20 MG tablet Take 3 tabs for 3 days, 2 tabs for 3 days and 1 tab for 3 days 11/12/21  Yes Sarai Hernandez, APRN - CNP       No future appointments. You Cuevas MSN, RN   Ambulatory Care Manager  Associate Care Management  Cell [phone]  [email or web address]

## 2021-12-21 ENCOUNTER — CARE COORDINATION (OUTPATIENT)
Dept: OTHER | Facility: CLINIC | Age: 36
End: 2021-12-21

## 2021-12-21 NOTE — CARE COORDINATION
Ambulatory Care Coordination Note  2021  CM Risk Score: 4  Charlson 10 Year Mortality Risk Score: 2%     ACC: Jeremy Payan, BRO     Ambulatory Care Manager [facility]) contacted the patient by telephone to follow up on progress, discuss new issues or concerns, and reinforce/ provide patient education. Verified name and  with patient as identifiers. Pt states the joint pain is only at nighttime, but does keep her awake; pt has completed her course of prednisone therapy. Pt reports she has not scheduled her follow up with PCP, as PCP told her to wait until  and she schedules via My Chart and PCP . schedule not available in My chart yet. ACM discussed importance of following up with PCP. Pt states that she is trying to save pto time, so she can take a vacation next year; states has to use all pto time for her daughter's medical needs and appts. ACM provided empathetic listening and discussed [facility] services; sent [facility] handout to her via My Chart. Pt states she received the link that AC sent via My Chart to find a new GI/liver specialists, but has not yet contacted them to make appt; states will try to do that soon. Pt denies any other questions or needs at this time. Reinforced/ Provided Education:  Discussed red flags and appropriate site of care based on symptoms and resources available to patient including: PCP, Benefits related nurse triage line, When to call 911 and [address]. Importance and benefits of: Follow up with PCP and specialist, medication adherence, self monitoring and reporting of symptoms. Plan:  Continue outreaches  In 3 weeks to provide telephonic support, education and resources as needed. Discuss / follow up on: Goal progress and PCP/liver specialist appts made; [facility] info. Pt verbalized understanding and is agreeable to follow up contact.      Care Coordination Interventions    Program Enrollment: Complex Care  Referral from Primary Care Provider: No  Suggested Interventions and Community Resources  Other Services: Completed (Comment: Discussed and send [facility] handout via My Chart)         Goals Addressed                 This Visit's Progress     Conditions and Symptoms   Improving     I will schedule office visits, as directed by my provider. I will keep my appointment or reschedule if I have to cancel. I will notify my provider of any barriers to my plan of care. I will notify my provider of any symptoms that indicate a worsening of my condition. Barriers: lack of education  Plan for overcoming my barriers: Work with ACM  Confidence: 6/10  Anticipated Goal Completion Date: 12/22/21 12/6/21 - [facility] sent pt link to [facility] on11/22, as she needs to find new liver specialist; pt had not accessed My Chart to find a [facility] provider. Pt to open message and to check on new provider before next outreach. 12/21/21 - Pt has not yet tried to find a provider for her liver; pt did access her my chart and the link to the [facility] GI/Liver specialist, but has not reached out to make appt. Pt also to make follow up appt with PCP in Jan.               Prior to Admission medications    Not on File       No future appointments. Remedios Buckner MSN, RN   Ambulatory Care Manager  [facility]  Cell [phone]  [email or web address]

## 2021-12-29 ENCOUNTER — OFFICE VISIT (OUTPATIENT)
Dept: FAMILY MEDICINE CLINIC | Age: 36
End: 2021-12-29
Payer: COMMERCIAL

## 2021-12-29 VITALS — HEART RATE: 94 BPM | OXYGEN SATURATION: 99 % | TEMPERATURE: 99.5 F

## 2021-12-29 DIAGNOSIS — Z20.822 SUSPECTED COVID-19 VIRUS INFECTION: Primary | ICD-10-CM

## 2021-12-29 PROCEDURE — 99213 OFFICE O/P EST LOW 20 MIN: CPT | Performed by: NURSE PRACTITIONER

## 2021-12-29 NOTE — PROGRESS NOTES
Sclera clear    [] Pinna, TMs,  Canals normal bilaterally  [] TM Red  [] Right [] Left [] Bilateral  [] TM Bulging [] Right [] Left [x]  Billateral    [] Oropharynx [x] Clear [] Red [] Exudate [] Swollen    [x] No adenopathy [] Adenopathy __________    [x] Lungs clear with good movement and effort  [x] Breathing appears normal     [] Speaks in complete sentences  [] Appears tachypneic   [] Wheezing           [] Rhonchi   [] Decreased    [x] CV RRR  [x] No Murmur  [] Murmur  [] Irregular  [] Tachycardic    [] OTHER:  1}      TESTS ORDERED:    [] POCT FLU  [] POCT STREP  [x] COVID-19 Test sent  [] Appointment made at testing clinic for patient to get a COVID test.       TEST RESULTS:    POCT FLU test:  [] Positive  [] Negative  POCT STREP test:  [] Positive  [] Negative    ASSESSMENT:  [] Allergic Rhinitis  [] Asthma Exacerbation  [] Bronchitis  [] COPD Exacerbation  [] Gastroenteritis  [] Influenza  [] Sinusitis  [] Strep Throat [] Sore Throat  [] Viral URI   [] Possible COVID-19   [] Exposure to COVID -19  [] Positive for COVID  [] Screening for Viral Disease (COVID test no sx)        Claribel was seen today for concern for covid-19.     Diagnoses and all orders for this visit:    Suspected COVID-19 virus infection  -     COVID-19    Other orders  -     COVID-19  -     COVID-19  -     COVID-19              [x] Low risk for complications from COVID 19  [] Moderate risk for complications from COVID 19  [] High risk for complications from COVID 19    PLAN:    [x] Discharge home with written instructions for:  [] Flu management  [] Strep throat management  [] Viral respiratory illness management  [] Sinusitis management  [] Bronchitis Management  [x] Possible COVID-19 infection with self-quarantine and management of symptoms  [x] Follow-up with primary care physician or emergency department if worsens  [] Note given for work    [] Referred to emergency department for evaluation

## 2021-12-30 ENCOUNTER — PATIENT MESSAGE (OUTPATIENT)
Dept: FAMILY MEDICINE CLINIC | Age: 36
End: 2021-12-30

## 2021-12-30 LAB — SARS-COV-2: NOT DETECTED

## 2021-12-30 NOTE — TELEPHONE ENCOUNTER
From: Valorie Mckee  To:  Reginaldo Holliday  Sent: 12/30/2021 7:38 AM EST  Subject: Question regarding [id number]    Why does it say order was canceled

## 2022-01-05 ENCOUNTER — OFFICE VISIT (OUTPATIENT)
Dept: FAMILY MEDICINE CLINIC | Age: 37
End: 2022-01-05
Payer: COMMERCIAL

## 2022-01-05 ENCOUNTER — NURSE TRIAGE (OUTPATIENT)
Dept: OTHER | Facility: CLINIC | Age: 37
End: 2022-01-05

## 2022-01-05 VITALS — OXYGEN SATURATION: 98 % | TEMPERATURE: 100.8 F | HEART RATE: 113 BPM

## 2022-01-05 DIAGNOSIS — Z20.822 SUSPECTED COVID-19 VIRUS INFECTION: ICD-10-CM

## 2022-01-05 DIAGNOSIS — J11.1 INFLUENZA-LIKE SYNDROME: Primary | ICD-10-CM

## 2022-01-05 LAB
INFLUENZA A ANTIGEN, POC: NEGATIVE
INFLUENZA B ANTIGEN, POC: NEGATIVE

## 2022-01-05 PROCEDURE — 99214 OFFICE O/P EST MOD 30 MIN: CPT | Performed by: NURSE PRACTITIONER

## 2022-01-05 PROCEDURE — 87804 INFLUENZA ASSAY W/OPTIC: CPT | Performed by: NURSE PRACTITIONER

## 2022-01-05 RX ORDER — ALBUTEROL SULFATE 90 UG/1
2 AEROSOL, METERED RESPIRATORY (INHALATION) 4 TIMES DAILY PRN
Qty: 18 G | Refills: 0 | Status: SHIPPED | OUTPATIENT
Start: 2022-01-05 | End: 2022-05-20

## 2022-01-05 RX ORDER — PREDNISONE 20 MG/1
TABLET ORAL
Qty: 10 TABLET | Refills: 0 | Status: SHIPPED | OUTPATIENT
Start: 2022-01-05 | End: 2022-05-20

## 2022-01-05 ASSESSMENT — ENCOUNTER SYMPTOMS
SORE THROAT: 1
WHEEZING: 0
COUGH: 1
RHINORRHEA: 1
SHORTNESS OF BREATH: 1

## 2022-01-05 NOTE — PROGRESS NOTES
Chantale Ortega  : 1985  Encounter date: 2022    This is a 39 y.o. female who presents with  Chief Complaint   Patient presents with    Cough     History of present illness:    HPI   1. Presents to clinic today for follow up from last week. Per patient after our visit seemed to improve some, but over the past few days symptoms have returned and worsened. Reports sinus congestion/drainage/sore throat/cough. Has had recent sick contacts with daughter - covid negative - PCR. Has been taking Ibuprofen, Mucinex along with OTC cold medications with some short term relief. Allergies   Allergen Reactions    Penicillins Hives     Current Outpatient Medications   Medication Sig Dispense Refill    albuterol sulfate HFA (VENTOLIN HFA) 108 (90 Base) MCG/ACT inhaler Inhale 2 puffs into the lungs 4 times daily as needed for Wheezing 18 g 0    predniSONE (DELTASONE) 20 MG tablet 2 tabs once daily 10 tablet 0    cyclobenzaprine (FLEXERIL) 10 MG tablet Take 1 tablet by mouth 3 times daily as needed for Muscle spasms 20 tablet 0    lidocaine (LIDODERM) 5 % Place 1 patch onto the skin daily 12 hours on, 12 hours off. 30 patch 0    naproxen (NAPROSYN) 500 MG tablet Take 1 tablet by mouth 2 times daily for 20 doses 20 tablet 0    sulfamethoxazole-trimethoprim (BACTRIM DS) 800-160 MG per tablet Take 1 tablet by mouth 2 times daily for 7 days 14 tablet 0    brompheniramine-pseudoephedrine-DM (BROMFED DM) 2-30-10 MG/5ML syrup Take 5 mLs by mouth 4 times daily as needed for Cough 100 mL 0     No current facility-administered medications for this visit. Review of Systems   Constitutional: Positive for chills, fatigue and fever. HENT: Positive for congestion, rhinorrhea (clear) and sore throat. Respiratory: Positive for cough and shortness of breath. Negative for wheezing. Past medical, surgical, family and social history were reviewed and updated with the patient.     Objective:    Pulse 113 Temp 100.8 °F (38.2 °C) (Tympanic)   SpO2 98%         BP Readings from Last 3 Encounters:   01/12/22 129/85   11/18/21 117/75   11/12/21 110/72     Wt Readings from Last 3 Encounters:   01/12/22 185 lb 1 oz (83.9 kg)   11/18/21 185 lb 9.6 oz (84.2 kg)   11/12/21 185 lb (83.9 kg)     Physical Exam  Constitutional:       General: She is not in acute distress. Appearance: She is well-developed. HENT:      Head: Normocephalic and atraumatic. Right Ear: Ear canal and external ear normal. A middle ear effusion (clear fluid) is present. Left Ear: Ear canal and external ear normal. A middle ear effusion (clear fluid) is present. Nose: Mucosal edema and rhinorrhea (clear) present. No nasal deformity. Right Sinus: No maxillary sinus tenderness or frontal sinus tenderness. Left Sinus: No maxillary sinus tenderness or frontal sinus tenderness. Mouth/Throat:      Pharynx: Uvula midline. Posterior oropharyngeal erythema (post nasal drip noted) present. No oropharyngeal exudate. Tonsils: No tonsillar exudate. 1+ on the right. 1+ on the left. Eyes:      General:         Right eye: No discharge. Left eye: No discharge. Conjunctiva/sclera: Conjunctivae normal.      Pupils: Pupils are equal, round, and reactive to light. Neck:      Trachea: No tracheal deviation. Cardiovascular:      Rate and Rhythm: Regular rhythm. Tachycardia present. Heart sounds: Normal heart sounds. Pulmonary:      Effort: Pulmonary effort is normal. No respiratory distress. Breath sounds: Normal breath sounds. Musculoskeletal:      Cervical back: Normal range of motion. Lymphadenopathy:      Cervical: Cervical adenopathy (<1cm, nontender) present. Skin:     General: Skin is warm and dry. Neurological:      Mental Status: She is alert and oriented to person, place, and time. Psychiatric:         Thought Content:  Thought content normal.         Judgment: Judgment normal. Results for POC orders placed in visit on 01/05/22   POCT Influenza A/B Antigen   Result Value Ref Range    Inflenza A Ag Negative     Influenza B Ag Negative      Assessment/Plan    1. Influenza-like syndrome  Initiate prednisone/Albuterol. Monitor for worsening symptoms. Will send Covid test for further evaluation. Given recommendations on when to report to ED.   - albuterol sulfate HFA (VENTOLIN HFA) 108 (90 Base) MCG/ACT inhaler; Inhale 2 puffs into the lungs 4 times daily as needed for Wheezing  Dispense: 18 g; Refill: 0  - predniSONE (DELTASONE) 20 MG tablet; 2 tabs once daily  Dispense: 10 tablet; Refill: 0  - POCT Influenza A/B Antigen    2. Suspected COVID-19 virus infection       Claribel Kelley was counseled regarding symptoms of current diagnosis, course and complications of disease if inadequately treated. Discussed side effects of medications, diagnosis, treatment options, and prognosis along with risks, benefits, complications, and alternatives of treatment including labs, imaging and other studies/treatment targets and goals. She verbalized understanding of instructions and counseling. Return if symptoms worsen or fail to improve. Medical decision making of moderate complexity.

## 2022-01-05 NOTE — TELEPHONE ENCOUNTER
Received call from Dory Sosa, employee of [address] [place] [address],[address]. Subjective: Caller states \"I just left my Dr's office with COVID symptoms. My manager stated that I needed to call the employee line to have my symptoms documented. I have been on hold with that line since 7am without a response. I knew this number from work and decided to call to get help. \"     RN received information from Charge, RN to give to pt to have EIS addressed and documented.        Reason for Disposition   Information only question and nurse able to answer    Protocols used: INFORMATION ONLY CALL - NO TRIAGE-ADULT-[place]

## 2022-01-06 ENCOUNTER — PATIENT MESSAGE (OUTPATIENT)
Dept: FAMILY MEDICINE CLINIC | Age: 37
End: 2022-01-06

## 2022-01-06 DIAGNOSIS — U07.1 COVID-19: Primary | ICD-10-CM

## 2022-01-06 LAB — SARS-COV-2, PCR: DETECTED

## 2022-01-06 RX ORDER — BROMPHENIRAMINE MALEATE, PSEUDOEPHEDRINE HYDROCHLORIDE, AND DEXTROMETHORPHAN HYDROBROMIDE 2; 30; 10 MG/5ML; MG/5ML; MG/5ML
5 SYRUP ORAL 4 TIMES DAILY PRN
Qty: 100 ML | Refills: 0 | Status: SHIPPED | OUTPATIENT
Start: 2022-01-06 | End: 2022-05-20

## 2022-01-06 NOTE — TELEPHONE ENCOUNTER
From: Bertha Del Castillo  To: Italia Akers  Sent: 1/6/2022 8:15 AM EST  Subject: Remyven Wellington     My symptoms are getting worse I can not swallow at all and I feel like there is a stinger in my throat! Is there anything you can please give me for this cough and sore throat?  Please let me know thank you

## 2022-01-06 NOTE — TELEPHONE ENCOUNTER
From: Jasper San  To:  Uriah Nunes  Sent: 1/6/2022 9:47 AM EST  Subject: Documentation    I need something with my name and date of birth from your office stating Im  Am positive please I sent over my My Chart and they said that was not good enough please

## 2022-01-11 ENCOUNTER — PATIENT MESSAGE (OUTPATIENT)
Dept: FAMILY MEDICINE CLINIC | Age: 37
End: 2022-01-11

## 2022-01-11 NOTE — TELEPHONE ENCOUNTER
From: Ana Maria Car  To: Mali Rutherford  Sent: 1/11/2022 8:54 AM EST  Subject: Kidney pain    Good morning. I thought I was on the upside of this COVID stuff but then last night I started having kidney pain on both sides or my back with a fever again. Please let me know what I need to do!

## 2022-01-12 ENCOUNTER — APPOINTMENT (OUTPATIENT)
Dept: CT IMAGING | Age: 37
End: 2022-01-12
Payer: COMMERCIAL

## 2022-01-12 ENCOUNTER — APPOINTMENT (OUTPATIENT)
Dept: GENERAL RADIOLOGY | Age: 37
End: 2022-01-12
Payer: COMMERCIAL

## 2022-01-12 ENCOUNTER — PATIENT MESSAGE (OUTPATIENT)
Dept: FAMILY MEDICINE CLINIC | Age: 37
End: 2022-01-12

## 2022-01-12 ENCOUNTER — HOSPITAL ENCOUNTER (EMERGENCY)
Age: 37
Discharge: HOME | End: 2022-01-12
Payer: COMMERCIAL

## 2022-01-12 VITALS
SYSTOLIC BLOOD PRESSURE: 129 MMHG | HEART RATE: 91 BPM | HEIGHT: 63 IN | RESPIRATION RATE: 16 BRPM | TEMPERATURE: 99.2 F | OXYGEN SATURATION: 97 % | BODY MASS INDEX: 32.79 KG/M2 | DIASTOLIC BLOOD PRESSURE: 85 MMHG | WEIGHT: 185.06 LBS

## 2022-01-12 DIAGNOSIS — N30.01 ACUTE CYSTITIS WITH HEMATURIA: ICD-10-CM

## 2022-01-12 DIAGNOSIS — M54.50 BILATERAL LOW BACK PAIN WITHOUT SCIATICA, UNSPECIFIED CHRONICITY: ICD-10-CM

## 2022-01-12 DIAGNOSIS — U07.1 COVID-19: Primary | ICD-10-CM

## 2022-01-12 LAB
ANION GAP SERPL CALCULATED.3IONS-SCNC: 9 MMOL/L (ref 3–16)
BACTERIA: ABNORMAL /HPF
BASOPHILS ABSOLUTE: 0 K/UL (ref 0–0.2)
BASOPHILS RELATIVE PERCENT: 0.3 %
BILIRUBIN URINE: NEGATIVE
BLOOD, URINE: ABNORMAL
BUN BLDV-MCNC: 12 MG/DL (ref 7–20)
CALCIUM SERPL-MCNC: 9.1 MG/DL (ref 8.3–10.6)
CHLORIDE BLD-SCNC: 103 MMOL/L (ref 99–110)
CLARITY: CLEAR
CO2: 28 MMOL/L (ref 21–32)
COLOR: YELLOW
CREAT SERPL-MCNC: 1.1 MG/DL (ref 0.6–1.1)
D DIMER: 1092 NG/ML DDU (ref 0–229)
EKG ATRIAL RATE: 78 BPM
EKG DIAGNOSIS: NORMAL
EKG P AXIS: 48 DEGREES
EKG P-R INTERVAL: 134 MS
EKG Q-T INTERVAL: 396 MS
EKG QRS DURATION: 86 MS
EKG QTC CALCULATION (BAZETT): 451 MS
EKG R AXIS: 15 DEGREES
EKG T AXIS: 37 DEGREES
EKG VENTRICULAR RATE: 78 BPM
EOSINOPHILS ABSOLUTE: 0.1 K/UL (ref 0–0.6)
EOSINOPHILS RELATIVE PERCENT: 1.9 %
EPITHELIAL CELLS, UA: 3 /HPF (ref 0–5)
GFR AFRICAN AMERICAN: >60
GFR NON-AFRICAN AMERICAN: 56
GLUCOSE BLD-MCNC: 104 MG/DL (ref 70–99)
GLUCOSE URINE: NEGATIVE MG/DL
HCG QUALITATIVE: NEGATIVE
HCT VFR BLD CALC: 38.5 % (ref 36–48)
HEMOGLOBIN: 13 G/DL (ref 12–16)
HYALINE CASTS: 0 /LPF (ref 0–8)
KETONES, URINE: NEGATIVE MG/DL
LEUKOCYTE ESTERASE, URINE: NEGATIVE
LYMPHOCYTES ABSOLUTE: 1.6 K/UL (ref 1–5.1)
LYMPHOCYTES RELATIVE PERCENT: 26.2 %
MCH RBC QN AUTO: 29.7 PG (ref 26–34)
MCHC RBC AUTO-ENTMCNC: 33.7 G/DL (ref 31–36)
MCV RBC AUTO: 88.1 FL (ref 80–100)
MICROSCOPIC EXAMINATION: YES
MONOCYTES ABSOLUTE: 0.4 K/UL (ref 0–1.3)
MONOCYTES RELATIVE PERCENT: 7.4 %
NEUTROPHILS ABSOLUTE: 3.9 K/UL (ref 1.7–7.7)
NEUTROPHILS RELATIVE PERCENT: 64.2 %
NITRITE, URINE: NEGATIVE
PDW BLD-RTO: 13.8 % (ref 12.4–15.4)
PH UA: 7.5 (ref 5–8)
PLATELET # BLD: 181 K/UL (ref 135–450)
PMV BLD AUTO: 8.5 FL (ref 5–10.5)
POTASSIUM SERPL-SCNC: 5.2 MMOL/L (ref 3.5–5.1)
PROTEIN UA: NEGATIVE MG/DL
RBC # BLD: 4.37 M/UL (ref 4–5.2)
RBC UA: 79 /HPF (ref 0–4)
SODIUM BLD-SCNC: 140 MMOL/L (ref 136–145)
SPECIFIC GRAVITY UA: 1.01 (ref 1–1.03)
TROPONIN: <0.01 NG/ML
URINE REFLEX TO CULTURE: ABNORMAL
URINE TYPE: ABNORMAL
UROBILINOGEN, URINE: 1 E.U./DL
WBC # BLD: 6 K/UL (ref 4–11)
WBC UA: 3 /HPF (ref 0–5)

## 2022-01-12 PROCEDURE — 96372 THER/PROPH/DIAG INJ SC/IM: CPT

## 2022-01-12 PROCEDURE — 84484 ASSAY OF TROPONIN QUANT: CPT

## 2022-01-12 PROCEDURE — 84703 CHORIONIC GONADOTROPIN ASSAY: CPT

## 2022-01-12 PROCEDURE — 6370000000 HC RX 637 (ALT 250 FOR IP): Performed by: PHYSICIAN ASSISTANT

## 2022-01-12 PROCEDURE — 36415 COLL VENOUS BLD VENIPUNCTURE: CPT

## 2022-01-12 PROCEDURE — 93010 ELECTROCARDIOGRAM REPORT: CPT | Performed by: INTERNAL MEDICINE

## 2022-01-12 PROCEDURE — 6360000002 HC RX W HCPCS: Performed by: PHYSICIAN ASSISTANT

## 2022-01-12 PROCEDURE — 71260 CT THORAX DX C+: CPT

## 2022-01-12 PROCEDURE — 71046 X-RAY EXAM CHEST 2 VIEWS: CPT

## 2022-01-12 PROCEDURE — 80048 BASIC METABOLIC PNL TOTAL CA: CPT

## 2022-01-12 PROCEDURE — 99283 EMERGENCY DEPT VISIT LOW MDM: CPT

## 2022-01-12 PROCEDURE — 85379 FIBRIN DEGRADATION QUANT: CPT

## 2022-01-12 PROCEDURE — 94640 AIRWAY INHALATION TREATMENT: CPT

## 2022-01-12 PROCEDURE — 94761 N-INVAS EAR/PLS OXIMETRY MLT: CPT

## 2022-01-12 PROCEDURE — 81001 URINALYSIS AUTO W/SCOPE: CPT

## 2022-01-12 PROCEDURE — 6360000004 HC RX CONTRAST MEDICATION: Performed by: PHYSICIAN ASSISTANT

## 2022-01-12 PROCEDURE — 85025 COMPLETE CBC W/AUTO DIFF WBC: CPT

## 2022-01-12 PROCEDURE — 93005 ELECTROCARDIOGRAM TRACING: CPT | Performed by: PHYSICIAN ASSISTANT

## 2022-01-12 RX ORDER — SULFAMETHOXAZOLE AND TRIMETHOPRIM 800; 160 MG/1; MG/1
1 TABLET ORAL 2 TIMES DAILY
Qty: 14 TABLET | Refills: 0 | Status: SHIPPED | OUTPATIENT
Start: 2022-01-12 | End: 2022-01-12 | Stop reason: SDUPTHER

## 2022-01-12 RX ORDER — IPRATROPIUM BROMIDE AND ALBUTEROL SULFATE 2.5; .5 MG/3ML; MG/3ML
1 SOLUTION RESPIRATORY (INHALATION)
Status: DISCONTINUED | OUTPATIENT
Start: 2022-01-12 | End: 2022-01-12

## 2022-01-12 RX ORDER — CYCLOBENZAPRINE HCL 10 MG
10 TABLET ORAL 3 TIMES DAILY PRN
Qty: 20 TABLET | Refills: 0 | Status: SHIPPED | OUTPATIENT
Start: 2022-01-12 | End: 2022-01-12 | Stop reason: SDUPTHER

## 2022-01-12 RX ORDER — SULFAMETHOXAZOLE AND TRIMETHOPRIM 800; 160 MG/1; MG/1
1 TABLET ORAL 2 TIMES DAILY
Qty: 14 TABLET | Refills: 0 | Status: SHIPPED | OUTPATIENT
Start: 2022-01-12 | End: 2022-01-19

## 2022-01-12 RX ORDER — LIDOCAINE 50 MG/G
1 PATCH TOPICAL DAILY
Qty: 30 PATCH | Refills: 0 | Status: SHIPPED | OUTPATIENT
Start: 2022-01-12 | End: 2022-01-12 | Stop reason: SDUPTHER

## 2022-01-12 RX ORDER — LIDOCAINE 50 MG/G
1 PATCH TOPICAL DAILY
Qty: 30 PATCH | Refills: 0 | Status: SHIPPED | OUTPATIENT
Start: 2022-01-12 | End: 2022-05-20

## 2022-01-12 RX ORDER — KETOROLAC TROMETHAMINE 30 MG/ML
30 INJECTION, SOLUTION INTRAMUSCULAR; INTRAVENOUS ONCE
Status: COMPLETED | OUTPATIENT
Start: 2022-01-12 | End: 2022-01-12

## 2022-01-12 RX ORDER — CYCLOBENZAPRINE HCL 10 MG
10 TABLET ORAL 3 TIMES DAILY PRN
Qty: 20 TABLET | Refills: 0 | Status: SHIPPED | OUTPATIENT
Start: 2022-01-12 | End: 2022-05-20

## 2022-01-12 RX ORDER — NAPROXEN 500 MG/1
500 TABLET ORAL 2 TIMES DAILY
Qty: 20 TABLET | Refills: 0 | Status: SHIPPED | OUTPATIENT
Start: 2022-01-12 | End: 2022-05-20

## 2022-01-12 RX ORDER — NAPROXEN 500 MG/1
500 TABLET ORAL 2 TIMES DAILY
Qty: 20 TABLET | Refills: 0 | Status: SHIPPED | OUTPATIENT
Start: 2022-01-12 | End: 2022-01-12 | Stop reason: SDUPTHER

## 2022-01-12 RX ORDER — IPRATROPIUM BROMIDE AND ALBUTEROL SULFATE 2.5; .5 MG/3ML; MG/3ML
1 SOLUTION RESPIRATORY (INHALATION) ONCE
Status: COMPLETED | OUTPATIENT
Start: 2022-01-12 | End: 2022-01-12

## 2022-01-12 RX ADMIN — KETOROLAC TROMETHAMINE 30 MG: 30 INJECTION, SOLUTION INTRAMUSCULAR at 10:18

## 2022-01-12 RX ADMIN — IPRATROPIUM BROMIDE AND ALBUTEROL SULFATE 1 AMPULE: .5; 3 SOLUTION RESPIRATORY (INHALATION) at 10:39

## 2022-01-12 RX ADMIN — IOPAMIDOL 75 ML: 755 INJECTION, SOLUTION INTRAVENOUS at 11:43

## 2022-01-12 ASSESSMENT — ENCOUNTER SYMPTOMS
COLOR CHANGE: 0
COUGH: 1
CONSTIPATION: 0
VOMITING: 0
SHORTNESS OF BREATH: 1
ABDOMINAL PAIN: 0
VOICE CHANGE: 0
NAUSEA: 0
DIARRHEA: 0
WHEEZING: 1
STRIDOR: 0
BACK PAIN: 1
TROUBLE SWALLOWING: 0
SORE THROAT: 0

## 2022-01-12 ASSESSMENT — PAIN SCALES - GENERAL: PAINLEVEL_OUTOF10: 8

## 2022-01-12 ASSESSMENT — PAIN DESCRIPTION - LOCATION: LOCATION: BACK

## 2022-01-12 NOTE — TELEPHONE ENCOUNTER
Made follow up phone call to patient. Per patient thought she was doing better after her diagnosis of Covid. Per patient feels like \"my whole back is bruised\" along with fevers - reports its at 101. Rates back/flank pain at an 8/10 - wakes her up in the middle of the night. Per patient hurts to take deep breaths in. Pulse Ox has been consistently at 99 on room air. Reports frequency with urination, but not sure if that is secondary to her cough. Has been taking Tylenol and Ibuprofen that helps with the fever, but not with the flank pain. Reports feels like \"my lungs hurt\". Advised with flank pain being so severe it is waking her up at night along with a 101 fever my recommendation is she reports to ED. Patient agreeable. Report called to [facility].

## 2022-01-12 NOTE — ED PROVIDER NOTES
[address] [facility]        Pt Name: Rachel Cabral  MRN: [id number]  [place] 1985  Date of evaluation: 1/12/2022  Provider: Mata Bal PA-C  PCP: LINA Boateng CNP  Note Started: 9:57 AM EST       SARAH. I have evaluated this patient. My supervising physician was available for consultation. CHIEF COMPLAINT       Chief Complaint   Patient presents with    Positive For Covid-19     c/o increased back pain and cough since being diagnosed with covid 1/5/22       HISTORY OF PRESENT ILLNESS   (Location, Timing/Onset, Context/Setting, Quality, Duration, Modifying Factors, Severity, Associated Signs and Symptoms)  Note limiting factors. Chief Complaint: Cough, congestion, shortness of breath, chest pain, back pain, fever, chills    Rachel Cabral is a 39 y.o. female who presents to the emergency department complaining of cough, congestion, shortness of breath, chest pain, back pain, fever and chills. She was diagnosed with COVID-19 on 1/5/2022. She was prescribed Bromfed, albuterol inhaler and prednisone 40 mg daily. She completed course of prednisone without any substantial relief. She takes ibuprofen as needed for pain. She has history of IV drug use but has not used in 10 years. She states that she did subsequently developed hepatitis C from IV drug use and was told that she should not take Tylenol. She rates her pain to be an 8 out of 10 on pain scale without radiation of symptoms, numbness, tingling, weakness, bowel or bladder incontinence or retention or urinary symptoms. Nursing Notes were all reviewed and agreed with or any disagreements were addressed in the HPI. REVIEW OF SYSTEMS    (2-9 systems for level 4, 10 or more for level 5)     Review of Systems   Constitutional: Positive for chills and fever. HENT: Positive for congestion.  Negative for dental problem, drooling, ear discharge, ear pain, sore throat, tinnitus, trouble swallowing and voice change. Eyes: Negative for visual disturbance. Respiratory: Positive for cough, shortness of breath and wheezing. Negative for stridor. Cardiovascular: Positive for chest pain. Negative for palpitations and leg swelling. Gastrointestinal: Negative for abdominal pain, constipation, diarrhea, nausea and vomiting. Genitourinary: Negative. Musculoskeletal: Positive for back pain. Negative for neck pain and neck stiffness. Skin: Negative for color change, pallor, rash and wound. Neurological: Negative for dizziness, tremors, seizures, syncope, facial asymmetry, speech difficulty, weakness, light-headedness, numbness and headaches. Psychiatric/Behavioral: Negative for confusion. All other systems reviewed and are negative. Positives and Pertinent negatives as per HPI. Except as noted above in the ROS, all other systems were reviewed and negative.        PAST MEDICAL HISTORY     Past Medical History:   Diagnosis Date    Hepatitis C     asymptomatic    IV drug abuse ([facility] [address]     Ovarian cyst          SURGICAL HISTORY     Past Surgical History:   Procedure Laterality Date    LAPAROSCOPY  2/10/16    diagnostic laparoscopy, KTP laser of endometriosis     LAPAROSCOPY  2017    for endometriosis    TUBAL LIGATION           CURRENTMEDICATIONS       Previous Medications    ALBUTEROL SULFATE HFA (VENTOLIN HFA) 108 (90 BASE) MCG/ACT INHALER    Inhale 2 puffs into the lungs 4 times daily as needed for Wheezing    BROMPHENIRAMINE-PSEUDOEPHEDRINE-DM (BROMFED DM) 2-30-10 MG/5ML SYRUP    Take 5 mLs by mouth 4 times daily as needed for Cough    PREDNISONE (DELTASONE) 20 MG TABLET    2 tabs once daily         ALLERGIES     Penicillins    FAMILYHISTORY       Family History   Problem Relation Age of Onset    Substance Abuse Mother     Mental Illness Mother     Heart Disease Maternal Grandmother     Emphysema Maternal Grandmother     Diabetes Maternal Grandmother     Heart Disease Paternal Grandmother     Diabetes Paternal Grandfather           SOCIAL HISTORY       Social History     Tobacco Use    Smoking status: Current Every Day Smoker     Packs/day: 0.25     Last attempt to quit: 12/15/2018     Years since quitting: 3.0    Smokeless tobacco: Never Used    Tobacco comment: e- cigarette only now   Vaping Use    Vaping Use: Not on file   Substance Use Topics    Alcohol use: No    Drug use: Not Currently     Types: IV     Comment: Been clean since 11/12/2013       SCREENINGS             PHYSICAL EXAM    (up to 7 for level 4, 8 or more for level 5)     ED Triage Vitals [01/12/22 0924]   BP Temp Temp Source Pulse Resp SpO2 Height Weight   129/85 99.2 °F (37.3 °C) Oral 91 18 97 % 5' 3\" (1.6 m) 185 lb 1 oz (83.9 kg)       Physical Exam  Vitals and nursing note reviewed. Constitutional:       Appearance: Normal appearance. She is well-developed. She is not toxic-appearing or diaphoretic. HENT:      Head: Normocephalic and atraumatic. Jaw: There is normal jaw occlusion. Right Ear: Hearing, tympanic membrane, ear canal and external ear normal.      Left Ear: Hearing, tympanic membrane, ear canal and external ear normal.      Nose: Congestion present. Mouth/Throat:      Lips: Pink. Mouth: Mucous membranes are moist.      Dentition: No dental tenderness. Pharynx: Oropharynx is clear. Uvula midline. No uvula swelling. Eyes:      General: No scleral icterus. Right eye: No discharge. Left eye: No discharge. Extraocular Movements: Extraocular movements intact. Conjunctiva/sclera: Conjunctivae normal.      Pupils: Pupils are equal, round, and reactive to light. Cardiovascular:      Rate and Rhythm: Normal rate. Pulses:           Femoral pulses are 2+ on the right side and 2+ on the left side. Posterior tibial pulses are 2+ on the right side and 2+ on the left side.    Pulmonary:      Effort: Pulmonary effort is normal.      Breath sounds: Normal breath sounds. Comments: Bronchospasm with inspiration  Abdominal:      General: Bowel sounds are normal.      Palpations: Abdomen is soft. Tenderness: There is no abdominal tenderness. Musculoskeletal:         General: Normal range of motion. Cervical back: Normal and normal range of motion. Thoracic back: Spasms and tenderness present. Lumbar back: Spasms and tenderness present. Negative left straight leg raise test.      Comments: No midline vertebral tenderness or step-off deformity. Negative straight leg raise. No saddle paresthesia or foot drop. Able to heel and toe walk without difficulty or deficit. 5/5 strength in all four extremities without focal weakness, paresthesia or radiculopathy   Skin:     General: Skin is warm and dry. Capillary Refill: Capillary refill takes less than 2 seconds. Coloration: Skin is not jaundiced or pale. Findings: No bruising, erythema, lesion or rash. Neurological:      General: No focal deficit present. Mental Status: She is alert and oriented to person, place, and time. Sensory: No sensory deficit. Motor: No weakness.    Psychiatric:         Behavior: Behavior normal.         DIAGNOSTIC RESULTS   LABS:    Labs Reviewed   BASIC METABOLIC PANEL - Abnormal; Notable for the following components:       Result Value    Potassium 5.2 (*)     Glucose 104 (*)     GFR Non- 56 (*)     All other components within normal limits    Narrative:     Performed at:  [facility] [address], [place], [address] [facility]   Phone [phone]   URINE RT REFLEX TO CULTURE - Abnormal; Notable for the following components:    Blood, Urine LARGE (*)     All other components within normal limits    Narrative:     Performed at:  [facility] [address] [facility], [facility]   Phone [phone]   D-DIMER, QUANTITATIVE - Abnormal; Notable for the following components:    D-Dimer, Quant 1092 (*)     All other components within normal limits    Narrative:     Performed at:  [facility] [address], [facility], [facility]   Phone [phone]   MICROSCOPIC URINALYSIS - Abnormal; Notable for the following components:    Bacteria, UA 2+ (*)     RBC, UA 79 (*)     All other components within normal limits    Narrative:     Performed at:  [facility] [address], [facility], [address] [place] [address]   Phone [phone]   CBC WITH AUTO DIFFERENTIAL    Narrative:     Performed at:  [facility] [address], [facility], [facility]   Phone [phone]   TROPONIN    Narrative:     Performed at:  [facility] [address], [facility], [facility]   Phone [phone]   HCG, SERUM, QUALITATIVE    Narrative:     Performed at:  [facility] [address], [facility], [facility]   Phone [phone]       When ordered only abnormal lab results are displayed. All other labs were within normal range or not returned as of this dictation. EKG: When ordered, EKG's are interpreted by the Emergency Department Physician in the absence of a cardiologist.  Please see their note for interpretation of EKG. RADIOLOGY:   Non-plain film images such as CT, Ultrasound and MRI are read by the radiologist. Plain radiographic images are visualized and preliminarily interpreted by the ED Provider with the below findings:        Interpretation per the Radiologist below, if available at the time of this note:    CT CHEST PULMONARY EMBOLISM W CONTRAST   Final Result   No evidence of pulmonary embolism or acute pulmonary abnormality. XR CHEST (2 VW)   Final Result   No acute cardiopulmonary process.                PROCEDURES   Unless otherwise noted below, none Procedures    CRITICAL CARE TIME   N/A    CONSULTS:  None      EMERGENCY DEPARTMENT COURSE and DIFFERENTIAL DIAGNOSIS/MDM:   Vitals:    Vitals:    01/12/22 0924 01/12/22 1041   BP: 129/85    Pulse: 91    Resp: 18 16   Temp: 99.2 °F (37.3 °C)    TempSrc: Oral    SpO2: 97% 97%   Weight: 185 lb 1 oz (83.9 kg)    Height: 5' 3\" (1.6 m)        Patient was given the following medications:  Medications   ketorolac (TORADOL) injection 30 mg (30 mg IntraMUSCular Given 1/12/22 1018)   ipratropium-albuterol (DUONEB) nebulizer solution 1 ampule (1 ampule Inhalation Given 1/12/22 1039)   iopamidol (ISOVUE-370) 76 % injection 75 mL (75 mLs IntraVENous Given 1/12/22 1143)           This patient was recently diagnosed with COVID-19. She presents with diffuse mid to low back pain. Also reports some chest discomfort when she coughs or takes a deep breath. D-dimer is positive therefore we did inquire further with CT scan of the chest which is negative for PE or acute intrathoracic abnormality. She is not currently on her menses and urinalysis reveals blood and 2+ bacteria. I suspect infection. Patient will therefore be sent home with Bactrim, which she states she has had before for UTI. She has diffuse muscular tenderness without any midline vertebral tenderness, step-off deformity, radiculopathy, weakness or paresthesia. Will be sent home with anti-inflammatory, muscle relaxant and lidocaine patches. She already has cough medication at home. She is advised to follow-up with PCP for recheck and may return to ED per discharge instructions. EKG appears stable. Troponin is negative.     My suspicion is low for ACS, PE, myocarditis, pericarditis, endocarditis, acute pulmonary edema, pleural effusion, pericardial effusion, cardiac tamponade, cardiomyopathy, CHF exacerbation, thoracic aortic dissection, esophageal rupture, other life-threatening arrhythmia,  hemothorax, pulmonary contusion, subcutaneous emphysema, flail chest, pneumo mediastinum, rib fracture, pneumonia, pneumothorax, ARDS, carotid dissection, sinus abscess, acute fracture, acute CVA, ICH, SAH, TIA, meningitis, encephalitis, pseudotumor cerebri, temporal arteritis, sentinel bleed from ruptured aneurysm, hypertensive urgency or emergency, subdural hematoma, epidural hematoma, cerebellar compromise, posterior stroke, bells palsy, TMJ syndrome, trigeminal neuralgia, dental abscess, Fran angina, otitis, acute strep pharyngitis, peritonsillar or tonsillar abscess, retropharyngeal abscess, bacterial tracheitis, epiglottitis, meningitis, encephalitis, foreign body, angioedema, anaphylaxis, mononucleosis, mumps, lymphoma, leukemia, asthma exacerbation, osteomyelitis, mastoiditis, sepsis, DKA, acute surgical abdomen, obstruction, perforation, abscess, mesenteric ischemia, AAA, dissection, cholecystitis, cholangitis, pancreatitis, appendicitis, C. diff colitis, diverticulitis, volvulus, incarcerated hernia, necrotizing fasciitis, TOA, ovarian torsion, PID, ectopic pregnancy, lobito link Kristopher syndrome,  incarcerated hernia, Smita gangrene, pyelonephritis, perinephric abscess, kidney stone, urosepsis, fistula, intussusception, acute spine fracture or dislocation, epidural abscess or hematoma, discitis, meningitis, encephalitis, transverse myelitis, shingles, or other concerning pathology. FINAL IMPRESSION      1. COVID-19    2. Bilateral low back pain without sciatica, unspecified chronicity    3.  Acute cystitis with hematuria          DISPOSITION/PLAN   DISPOSITION Decision To Discharge 01/12/2022 12:16:37 PM      PATIENT REFERRED TO:  Merlinda Ramsay, APRN - CNP  [address] [place] [address]  [phone]    In 3 days      [facility] Emergency Department  [address] [facility]  [phone]    If symptoms worsen      DISCHARGE MEDICATIONS:  New Prescriptions    CYCLOBENZAPRINE (FLEXERIL) 10 MG TABLET    Take 1 tablet by mouth 3 times daily as needed for Muscle spasms    LIDOCAINE (LIDODERM) 5 %    Place 1 patch onto the skin daily 12 hours on, 12 hours off.     NAPROXEN (NAPROSYN) 500 MG TABLET    Take 1 tablet by mouth 2 times daily for 20 doses    SULFAMETHOXAZOLE-TRIMETHOPRIM (BACTRIM DS) 800-160 MG PER TABLET    Take 1 tablet by mouth 2 times daily for 7 days       DISCONTINUED MEDICATIONS:  Discontinued Medications    No medications on file              (Please note that portions of this note were completed with a voice recognition program.  Efforts were made to edit the dictations but occasionally words are mis-transcribed.)    Nelsy Souza PA-C (electronically signed)           Nelsy Souza PA-C  01/12/22 0812

## 2022-01-12 NOTE — TELEPHONE ENCOUNTER
From: Lakeshia Calderon  To: Lion Scott  Sent: 1/12/2022 11:15 AM EST  Subject: Leave extension     My work is going to ask that you give me more leave time. Is that okay and I will send you the paperwork once I get it!  I am still in the ER

## 2022-01-12 NOTE — ED PROVIDER NOTES
EKG:  Read by me in the absence of a cardiologist shows:  Sinus rhythm, normal rate, normal conduction intervals, normal axis, no acute injury pattern, no major change from prior study      I was available for consultation. I did not perform face-to-face evaluation. Please see SEGUNDO Guerrero note for further information on this visit.          Nicholas Cutler MD  01/12/22 0153

## 2022-01-13 ENCOUNTER — CARE COORDINATION (OUTPATIENT)
Dept: OTHER | Facility: CLINIC | Age: 37
End: 2022-01-13

## 2022-01-13 NOTE — CARE COORDINATION
Ambulatory Care Coordination Note  1/13/2022  CM Risk Score: 4  Charlson 10 Year Mortality Risk Score: 2%     ACC: Ary Dakins, BRO    Summary Note: ACM attempted to reach patient for follow up call regarding care management. HIPAA compliant message left requesting a return phone call at patients convenience. Will continue to follow. No future appointments. Salima WESTON, RN   Ambulatory Care Manager  Associate Care Management  Cell [phone]  [email or web address]

## 2022-01-14 ENCOUNTER — CARE COORDINATION (OUTPATIENT)
Dept: OTHER | Facility: CLINIC | Age: 37
End: 2022-01-14

## 2022-01-19 ENCOUNTER — CARE COORDINATION (OUTPATIENT)
Dept: OTHER | Facility: CLINIC | Age: 37
End: 2022-01-19

## 2022-01-19 NOTE — CARE COORDINATION
[address] [facility] ED Follow Up Call    2022    Patient: Ana Calvin Patient : 1985   MRN: [id number]  Reason for Admission: Covid+, Acute cystitis  Discharge Date: 22    Patient contacted regarding COVID-19 diagnosis. Discussed COVID-19 related testing which was available at this time. Test results were positive. Patient informed of results, if available? Yes. Ambulatory Care Manager contacted the patient by telephone to perform post discharge assessment. Provided reason for call due to risk factors for infection and/or exposure to COVID-19. Symptoms reviewed with patient who verbalized the following symptoms: cough, shortness of breath, no new symptoms and no worsening symptoms. Due to no new or worsening symptoms encounter was not routed to provider for escalation. Discussed follow-up appointments. If no appointment was previously scheduled, appointment scheduling offered: pt states she has been in contact with pcp via my chart. [facility] follow up appointment(s): No future appointments. Non-[facility] follow up appointment(s): none known    Non-face-to-face services provided:  Obtained and reviewed discharge summary and/or continuity of care documents  Education of patient/family/caregiver/guardian to support self-management-red flag symptoms and appropriate sites of care, including pcp, use of my chart, nurse access line, when to go to urgent care or ED, when to call 911 for life threatening emergencies and pt verblized understanding  Assessment and support for treatment adherence and medication management-reviewed meds with pt and pt denies any questions     Pt reports she went to ED on 22 as her covid symptoms were getting worse and she had a lot of back pain; states she was diagnosed with bilateral kidney infections.    Pt reports that she is feeling much better; states she still has on occasional nonproductive cough; states she gets short of breath upon exertion, but has not checked pulse ox recently. ACM educated pt to check pulse ox when short of breath. Pt denies any current fever, chest pain, nausea, vomiting, diarrhea. Pt states sense of smell is off, stating she smells cigarettes, but noone is smoking. Pt denies any urinary frequency or urgency; denies blood in urine and back pain is much better. Pt states she does have stress incontinence, but this is not a new issue. ACM encouraged pt to drink plenty of water daily. ACM reviewed meds with pt and pt taking as prescribed, but states she has missed a couple doses of her bactrim. ACM educated pt on importance of not missing doses and to finish all of antibiotic. Pt reports that she will be returning to work on 1/20/22. Pt denies any further questions, needs or concerns at this time; is agreeable to a follow up call. Advance Care Planning:   Does patient have an Advance Directive:  not on file. Educated patient about risk for severe COVID-19 due to risk factors according to CDC guidelines. ACM reviewed discharge instructions, medical action plan and red flag symptoms with the patient who verbalized understanding. Discussed COVID vaccination status: Yes and pt states she has had 2 vaccines and the booster was given on 12/31/21. Education provided on COVID-19 vaccination as appropriate. Discussed exposure protocols and quarantine with CDC Guidelines. Patient was given an opportunity to verbalize any questions and concerns and agrees to contact ACM or health care provider for questions related to their healthcare. Reviewed and educated patient on any new and changed medications related to discharge diagnosis     Was patient discharged with a pulse oximeter? No and pt borrowed one from sister. Pt states she has not had to check pulse for the last few days. Discussed and confirmed pulse oximeter discharge instructions and when to notify provider or seek emergency care. AC provided contact information. Plan for follow-up call in 5-7 days based on severity of symptoms and risk factors. Care Transitions ED Follow Up    Care Transitions Interventions     Other Services:  (Comment: Had sent pt Covid resource letter and Covid Zone tool)   Do you have any ongoing symptoms?: Yes   Patient-reported symptoms: Cough, Shortness of Breath   Did you call your PCP prior to going to the ED?: Yes - Advised to go to the ED   Do you have a copy of your discharge instructions?: Yes   Do you understand what to report and when to return?: Yes   Are you following your discharge instructions?: Yes   Do you have all of your prescriptions and are they filled?: Yes   Have you scheduled your follow up appointment?: No   Were you discharged with any Home Care or Post Acute Services or do you currently have any active services?: No              Goals      Conditions and Symptoms      I will schedule office visits, as directed by my provider. I will keep my appointment or reschedule if I have to cancel. I will notify my provider of any barriers to my plan of care. I will notify my provider of any symptoms that indicate a worsening of my condition. Barriers: lack of education  Plan for overcoming my barriers: Work with [facility]  Confidence: 6/10  Anticipated Goal Completion Date: 12/22/21 12/6/21 - [facility] sent pt link to [facility] on11/22, as she needs to find new liver specialist; pt had not accessed My Chart to find a [facility] provider. Pt to open message and to check on new provider before next outreach. 12/21/21 - Pt has not yet tried to find a provider for her liver; pt did access her my chart and the link to the [facility] GI/Liver specialist, but has not reached out to make appt.  Pt also to make follow up appt with PCP in Jan.     1/19/22 - Pt did go to pcp on 1/5/22 for covid symptoms; has been in touch with pcp via my chart            Urmila Noel MSN, RN   Ambulatory Care Manager  Associate Care Management  Cell [phone]  [email or web address]

## 2022-01-31 ENCOUNTER — CARE COORDINATION (OUTPATIENT)
Dept: OTHER | Facility: CLINIC | Age: 37
End: 2022-01-31

## 2022-01-31 NOTE — CARE COORDINATION
[address] [facility] ED Follow Up Call    2022    Patient: Bianka Vega Patient : 1985   MRN: [id number]  Reason for Admission: Covid+, Acute cystitis  Discharge Date: 23      ACM attempted to reach patient for follow up call regarding Covid 19 monitoring. HIPAA compliant message left requesting a return phone call at patients convenience. Will continue to follow. Ysabel WESTON, RN   Ambulatory Care Manager  Associate Care Management  Cell [phone]  [email or web address]

## 2022-02-14 ENCOUNTER — CARE COORDINATION (OUTPATIENT)
Dept: OTHER | Facility: CLINIC | Age: 37
End: 2022-02-14

## 2022-02-14 NOTE — CARE COORDINATION
[address] [facility] ED Follow Up Call    2022    Patient: Dhiraj Luciano Patient : 1985   MRN: [id number]  Reason for Admission: Covid+, Acute cystitis  Discharge Date: 22      ACM attempted to reach patient for final follow up call regarding Covid 19 monitoring. HIPAA compliant message left requesting a return phone call at patients convenience. ACM will continue to follow for care management. Unable to Reach Letter sent to patient via My Chart. Kathleen WESTON, RN   Ambulatory Care Manager  Associate Care Management  Cell [phone]  [email or web address]

## 2022-02-25 ENCOUNTER — CARE COORDINATION (OUTPATIENT)
Dept: OTHER | Facility: CLINIC | Age: 37
End: 2022-02-25

## 2022-02-25 NOTE — CARE COORDINATION
Ambulatory Care Coordination Note  2/25/2022  CM Risk Score: 4  Charlson 10 Year Mortality Risk Score: 2%     ACC: Brenda Harris RN    Summary Note: ACM attempted to reach patient for follow up call regarding care management, complex care. HIPAA compliant message left requesting a return phone call at patients convenience. Final Unable to Reach Letter sent via My Chart. [facility] has attempted several outreaches, without response from pt. If no response from pt, there will be no further outreach scheduled with this ACM, ACM will sign off care team. Patient has been provided with this ACM's contact information. No future appointments. Frantz WESTON, RN   Ambulatory Care Manager  Associate Care Management  Cell [phone]  [email or web address]

## 2022-02-28 ENCOUNTER — PATIENT MESSAGE (OUTPATIENT)
Dept: FAMILY MEDICINE CLINIC | Age: 37
End: 2022-02-28

## 2022-02-28 DIAGNOSIS — F41.9 ANXIETY: Primary | ICD-10-CM

## 2022-02-28 RX ORDER — HYDROXYZINE PAMOATE 25 MG/1
25 CAPSULE ORAL 3 TIMES DAILY PRN
Qty: 60 CAPSULE | Refills: 0 | Status: SHIPPED | OUTPATIENT
Start: 2022-02-28 | End: 2022-03-30

## 2022-02-28 NOTE — TELEPHONE ENCOUNTER
From: Dhiraj Luciano  To:  Eulogio Artist  Sent: 2/28/2022 6:47 AM EST  Subject: Anxiety    Good morning I was wondering if there was anyway Erika Saini could prescribe me something for my anxiety I havent been able to sleep Im nauseated most of the time ever since all this stuff happened with [place] [facility] I worry constantly please let me know thank you

## 2022-03-31 ENCOUNTER — E-VISIT (OUTPATIENT)
Dept: PRIMARY CARE CLINIC | Age: 37
End: 2022-03-31
Payer: COMMERCIAL

## 2022-03-31 DIAGNOSIS — R21 RASH: Primary | ICD-10-CM

## 2022-03-31 PROCEDURE — 99422 OL DIG E/M SVC 11-20 MIN: CPT | Performed by: NURSE PRACTITIONER

## 2022-04-01 NOTE — PROGRESS NOTES
Reviewed questionnaire  Reviewed photo, meds, allergies and history    Dx  Rash    Plan  rx kenalog 0.1% topical BID    Recommend f/u with pcp for further evaluation    Time 11-20

## 2022-05-20 ENCOUNTER — OFFICE VISIT (OUTPATIENT)
Dept: FAMILY MEDICINE CLINIC | Age: 37
End: 2022-05-20
Payer: COMMERCIAL

## 2022-05-20 VITALS
WEIGHT: 163 LBS | TEMPERATURE: 97.7 F | BODY MASS INDEX: 28.87 KG/M2 | DIASTOLIC BLOOD PRESSURE: 80 MMHG | SYSTOLIC BLOOD PRESSURE: 120 MMHG

## 2022-05-20 DIAGNOSIS — N63.0 BREAST LUMP: Primary | ICD-10-CM

## 2022-05-20 PROCEDURE — 99213 OFFICE O/P EST LOW 20 MIN: CPT | Performed by: FAMILY MEDICINE

## 2022-05-20 NOTE — PROGRESS NOTES
SUBJECTIVE:    Matthew Parikh is a 39 y.o. female who presents for a follow up visit. Chief Complaint   Patient presents with    Breast Mass     Sore lump above right breast, noticed on Monday while in the shower. HPI   Breast Lump  Patient states that while bathing 4 days prior she felt the lump in her upper right breast.  It is mildly tender. She has never had any other lumps. Denies family history of breast cancer. Patient's medications, allergies, past medical,surgical, social and family histories were reviewed and updated as appropriate. Past Medical History:   Diagnosis Date    Hepatitis C     asymptomatic    IV drug abuse ([facility] [address]     Ovarian cyst      Past Surgical History:   Procedure Laterality Date    LAPAROSCOPY  2/10/16    diagnostic laparoscopy, KTP laser of endometriosis     LAPAROSCOPY  2017    for endometriosis    TUBAL LIGATION       Family History   Problem Relation Age of Onset    Substance Abuse Mother     Mental Illness Mother     Heart Disease Maternal Grandmother     Emphysema Maternal Grandmother     Diabetes Maternal Grandmother     Heart Disease Paternal Grandmother     Diabetes Paternal Grandfather      Social History     Tobacco Use    Smoking status: Former Smoker     Packs/day: 0.25     Quit date: 12/15/2018     Years since quitting: 3.4    Smokeless tobacco: Never Used    Tobacco comment: e- cigarette only now   Substance Use Topics    Alcohol use: No      Allergies   Allergen Reactions    Penicillins Hives     No current outpatient medications on file prior to visit. No current facility-administered medications on file prior to visit. Review of Systems    OBJECTIVE:    /80   Temp 97.7 °F (36.5 °C)   Wt 163 lb (73.9 kg)   BMI 28.87 kg/m²    Physical Exam  Constitutional:       Appearance: She is well-developed. HENT:      Head: Normocephalic and atraumatic.       Right Ear: External ear normal.      Left Ear: External ear normal.      Nose: Nose normal.   Eyes:      General:         Right eye: No discharge. Conjunctiva/sclera: Conjunctivae normal.   Neck:      Thyroid: No thyromegaly. Vascular: No JVD. Trachea: No tracheal deviation. Cardiovascular:      Rate and Rhythm: Normal rate and regular rhythm. Heart sounds: Normal heart sounds. Pulmonary:      Effort: Pulmonary effort is normal. No respiratory distress. Breath sounds: Normal breath sounds. No rales. Chest:   Breasts:      Right: No axillary adenopathy. Left: No axillary adenopathy. Musculoskeletal:      Cervical back: Normal range of motion and neck supple. Lymphadenopathy:      Cervical: No cervical adenopathy. Upper Body:      Right upper body: No axillary adenopathy. Left upper body: No axillary adenopathy. Skin:     General: Skin is warm and dry. Neurological:      Mental Status: She is alert and oriented to person, place, and time. ASSESSMENT/PLAN:    Christi Rivera was seen today for breast mass. Diagnoses and all orders for this visit:    Breast lump  Called the breast surgeons office and they recommended an ultrasound initially since there was no family history of breast cancer no personal history. -     US BREAST LIMITED RIGHT; Future  -     [facility] - Kaylyn Baez, CNP, Breast Surgery, [facility]        Return if symptoms worsen or fail to improve. Please note portions of this note were completed with a voicerecognition program.  Efforts were made to edit the dictations but occasionally words are mis-transcribed.

## 2022-06-02 ENCOUNTER — PATIENT MESSAGE (OUTPATIENT)
Dept: FAMILY MEDICINE CLINIC | Age: 37
End: 2022-06-02

## 2022-06-02 ENCOUNTER — NURSE TRIAGE (OUTPATIENT)
Dept: OTHER | Facility: CLINIC | Age: 37
End: 2022-06-02

## 2022-06-02 ENCOUNTER — TELEPHONE (OUTPATIENT)
Dept: FAMILY MEDICINE CLINIC | Age: 37
End: 2022-06-02

## 2022-06-02 DIAGNOSIS — L56.4 POLYMORPHIC LIGHT ERUPTION: Primary | ICD-10-CM

## 2022-06-02 DIAGNOSIS — N63.0 BREAST LUMP: Primary | ICD-10-CM

## 2022-06-02 RX ORDER — ONDANSETRON 4 MG/1
4 TABLET, FILM COATED ORAL 3 TIMES DAILY PRN
Qty: 15 TABLET | Refills: 0 | Status: SHIPPED | OUTPATIENT
Start: 2022-06-02 | End: 2022-08-31

## 2022-06-02 RX ORDER — PREDNISONE 20 MG/1
TABLET ORAL
Qty: 10 TABLET | Refills: 0 | Status: SHIPPED | OUTPATIENT
Start: 2022-06-02 | End: 2022-08-31

## 2022-06-02 NOTE — TELEPHONE ENCOUNTER
From: Penny Adams  To: Clara How  Sent: 6/2/2022 12:10 PM EDT  Subject: Sunburn     I have a really bad sunburn on my stomach. I was in the sun Saturday and didnt realize I was getting burnt. I now have blisters around my bellybutton and I can barely walk. What should I do?

## 2022-06-02 NOTE — TELEPHONE ENCOUNTER
[facility] calling to get a new order placed for this patient for a bilateral diagnostic mammogram, she has an appt for an US tomorrow, 6/3. Patient had seen Dr. Enrrique Nava on 5/20. No call back is needed.

## 2022-06-02 NOTE — TELEPHONE ENCOUNTER
Subjective: Caller states \"sunburn\"     Current Symptoms: sunburn all over has blisters to abd some nausea, very painful    andrey messaged M Pedro and she did prescribe predisone and zofran    If unable to get into see pcp then will need to go to ucc/er    Onset: Saturday    Associated Symptoms: NA    Pain Severity: 10/10    Temperature: none    What has been tried: aloe, motrin    LMP: currently Pregnant: NA    Recommended disposition: Go to ED/UCC Now (Or to Office with PCP Approval)    Care advice provided, patient verbalizes understanding; denies any other questions or concerns; instructed to call back for any new or worsening symptoms. Attention Provider: Thank you for allowing me to participate in the care of your patient. The patient was connected to triage in response to symptoms provided. Please do not respond through this encounter as the response is not directed to a shared pool.       Reason for Disposition   Blisters (second-degree burn) covering > 10% BSA    Protocols used: SUNBURN-ADULT-[place]

## 2022-08-24 DIAGNOSIS — B37.31 CANDIDA VAGINITIS: Primary | ICD-10-CM

## 2022-08-24 RX ORDER — FLUCONAZOLE 150 MG/1
150 TABLET ORAL DAILY
Qty: 3 TABLET | Refills: 0 | Status: SHIPPED | OUTPATIENT
Start: 2022-08-24 | End: 2022-08-27

## 2022-08-29 ENCOUNTER — PATIENT MESSAGE (OUTPATIENT)
Dept: FAMILY MEDICINE CLINIC | Age: 37
End: 2022-08-29

## 2022-08-29 NOTE — TELEPHONE ENCOUNTER
From: Zay Mace  To: Leon Leo  Sent: 8/29/2022 8:57 AM EDT  Subject: Appointment     Good morning! I would like to schedule an appointment for Wednesday I am still having issues with my vagina and would like to have it checked out! I am off Wednesday and available after 1230! Please let me know I tried to schedule it and Im only getting the end of September.

## 2022-08-31 ENCOUNTER — OFFICE VISIT (OUTPATIENT)
Dept: FAMILY MEDICINE CLINIC | Age: 37
End: 2022-08-31
Payer: COMMERCIAL

## 2022-08-31 VITALS
BODY MASS INDEX: 28.84 KG/M2 | WEIGHT: 162.8 LBS | HEART RATE: 106 BPM | DIASTOLIC BLOOD PRESSURE: 80 MMHG | TEMPERATURE: 97.8 F | OXYGEN SATURATION: 98 % | SYSTOLIC BLOOD PRESSURE: 112 MMHG

## 2022-08-31 DIAGNOSIS — R10.30 LOWER ABDOMINAL PAIN: ICD-10-CM

## 2022-08-31 DIAGNOSIS — N92.6 IRREGULAR PERIODS: ICD-10-CM

## 2022-08-31 DIAGNOSIS — R87.619 ABNORMAL CERVICAL PAPANICOLAOU SMEAR, UNSPECIFIED ABNORMAL PAP FINDING: ICD-10-CM

## 2022-08-31 DIAGNOSIS — B96.89 BACTERIAL VAGINOSIS: Primary | ICD-10-CM

## 2022-08-31 DIAGNOSIS — N81.10 ACQUIRED FEMALE BLADDER PROLAPSE: ICD-10-CM

## 2022-08-31 DIAGNOSIS — N76.0 BACTERIAL VAGINOSIS: Primary | ICD-10-CM

## 2022-08-31 LAB
BACTERIA URINE, POC: 0
BILIRUBIN URINE: 0 MG/DL
BLOOD, URINE: NEGATIVE
CASTS URINE, POC: 0
CLARITY: CLEAR
COLOR: YELLOW
CRYSTALS URINE, POC: 0
EPI CELLS URINE, POC: NORMAL
GLUCOSE URINE: NEGATIVE
KETONES, URINE: NEGATIVE
LEUKOCYTE EST, POC: NORMAL
NITRITE, URINE: NEGATIVE
PH UA: 6 (ref 4.5–8)
PROTEIN UA: NEGATIVE
RBC URINE, POC: 0
SPECIFIC GRAVITY UA: 1.02 (ref 1–1.03)
UROBILINOGEN, URINE: NORMAL
WBC URINE, POC: NORMAL
YEAST URINE, POC: 0

## 2022-08-31 PROCEDURE — 81000 URINALYSIS NONAUTO W/SCOPE: CPT | Performed by: NURSE PRACTITIONER

## 2022-08-31 PROCEDURE — 99214 OFFICE O/P EST MOD 30 MIN: CPT | Performed by: NURSE PRACTITIONER

## 2022-08-31 RX ORDER — METRONIDAZOLE 500 MG/1
500 TABLET ORAL 2 TIMES DAILY
Qty: 14 TABLET | Refills: 0 | Status: SHIPPED | OUTPATIENT
Start: 2022-08-31 | End: 2022-09-07

## 2022-08-31 ASSESSMENT — PATIENT HEALTH QUESTIONNAIRE - PHQ9
SUM OF ALL RESPONSES TO PHQ QUESTIONS 1-9: 4
1. LITTLE INTEREST OR PLEASURE IN DOING THINGS: 0
7. TROUBLE CONCENTRATING ON THINGS, SUCH AS READING THE NEWSPAPER OR WATCHING TELEVISION: 0
3. TROUBLE FALLING OR STAYING ASLEEP: 0
9. THOUGHTS THAT YOU WOULD BE BETTER OFF DEAD, OR OF HURTING YOURSELF: 0
8. MOVING OR SPEAKING SO SLOWLY THAT OTHER PEOPLE COULD HAVE NOTICED. OR THE OPPOSITE, BEING SO FIGETY OR RESTLESS THAT YOU HAVE BEEN MOVING AROUND A LOT MORE THAN USUAL: 1
SUM OF ALL RESPONSES TO PHQ QUESTIONS 1-9: 4
6. FEELING BAD ABOUT YOURSELF - OR THAT YOU ARE A FAILURE OR HAVE LET YOURSELF OR YOUR FAMILY DOWN: 0
SUM OF ALL RESPONSES TO PHQ9 QUESTIONS 1 & 2: 0
10. IF YOU CHECKED OFF ANY PROBLEMS, HOW DIFFICULT HAVE THESE PROBLEMS MADE IT FOR YOU TO DO YOUR WORK, TAKE CARE OF THINGS AT HOME, OR GET ALONG WITH OTHER PEOPLE: 1
2. FEELING DOWN, DEPRESSED OR HOPELESS: 0
SUM OF ALL RESPONSES TO PHQ QUESTIONS 1-9: 4
4. FEELING TIRED OR HAVING LITTLE ENERGY: 3
5. POOR APPETITE OR OVEREATING: 0
SUM OF ALL RESPONSES TO PHQ QUESTIONS 1-9: 4

## 2022-08-31 ASSESSMENT — ENCOUNTER SYMPTOMS
VOMITING: 0
NAUSEA: 0
DIARRHEA: 0
COUGH: 0
SHORTNESS OF BREATH: 0

## 2022-08-31 NOTE — PROGRESS NOTES
Dalia Headley  : 1985  Encounter date: 2022    This is a 40 y.o. female who presents with  Chief Complaint   Patient presents with    Vaginal Discharge     Was a milky white now brown in color. States that on Saturday night had lower abdominal pain and felt like she needed to push. Wears a pad at all times due to urinary leakage. History of present illness:    HPI   Presents to clinic today with concerns for continued vaginal discharge. Last week took diflucan x 3 days and made no change in discharge. Over the weekend started with pelvic pressure and excess urination - does have a known bladder prolapse - needs surgery - but unable to do that now with work restrictions. Continues with intermittent sharp pains in pelvis, but excessive urination has improved. Reports continued with discharge and is now brown and creamy in consistency with occasional smell. Is in a monogamous relationship for the past 3 months. Recently has been having issues with her irregular periods and breast tenderness. Had a tubal ligation 15 years prior. Had an ablation 6-7 years prior. Hasn't had a PAP in approximately 6 years - was abnormal.     Allergies   Allergen Reactions    Penicillins Hives     Current Outpatient Medications   Medication Sig Dispense Refill    metroNIDAZOLE (FLAGYL) 500 MG tablet Take 1 tablet by mouth 2 times daily for 7 days 14 tablet 0     No current facility-administered medications for this visit. Review of Systems   Constitutional:  Negative for activity change, appetite change, chills, fatigue and fever. Respiratory:  Negative for cough and shortness of breath. Cardiovascular:  Negative for chest pain and palpitations. Gastrointestinal:  Negative for diarrhea, nausea and vomiting. Past medical, surgical, family and social history were reviewed and updated with the patient.     Objective:    /80 (Site: Right Upper Arm, Position: Sitting, Cuff Size: Medium Adult)   Pulse (!) 106   Temp 97.8 °F (36.6 °C)   Wt 162 lb 12.8 oz (73.8 kg)   SpO2 98%   BMI 28.84 kg/m²   Weight: 162 lb 12.8 oz (73.8 kg)     BP Readings from Last 3 Encounters:   08/31/22 112/80   05/20/22 120/80   01/12/22 129/85     Wt Readings from Last 3 Encounters:   08/31/22 162 lb 12.8 oz (73.8 kg)   05/20/22 163 lb (73.9 kg)   01/12/22 185 lb 1 oz (83.9 kg)     Physical Exam  Constitutional:       General: She is not in acute distress. Appearance: She is well-developed. HENT:      Head: Normocephalic and atraumatic. Cardiovascular:      Rate and Rhythm: Normal rate and regular rhythm. Heart sounds: Normal heart sounds, S1 normal and S2 normal.   Pulmonary:      Effort: Pulmonary effort is normal. No respiratory distress. Breath sounds: Normal breath sounds. Abdominal:      General: Abdomen is flat. Bowel sounds are normal.      Palpations: Abdomen is soft. Tenderness: There is abdominal tenderness in the suprapubic area. There is no guarding or rebound. Skin:     General: Skin is warm and dry. Neurological:      Mental Status: She is alert and oriented to person, place, and time. Psychiatric:         Thought Content: Thought content normal.         Judgment: Judgment normal.     Assessment/Plan    1. Bacterial vaginosis  Initiate metronidazole. If symptoms persist follow with GYN. Will send for urine culture and GC/Chlamydia for further evaluation.   - metroNIDAZOLE (FLAGYL) 500 MG tablet; Take 1 tablet by mouth 2 times daily for 7 days  Dispense: 14 tablet; Refill: 0    2. Lower abdominal pain  Mostly likely due to her bladder prolapse. Continue to monitor.   - C.trachomatis N.gonorrhoeae DNA, Urine  - Culture, Urine  - POCT Urine with Microscopic    3. Acquired female bladder prolapse    4. Abnormal cervical Papanicolaou smear, unspecified abnormal pap finding  Follow up with Lisa Gamez MD, Obstetrics, [facility]    5.  Irregular periods  - PALMA Anne MD, Obstetrics, [address] was counseled regarding symptoms of current diagnosis, course and complications of disease if inadequately treated. Discussed side effects of medications, diagnosis, treatment options, and prognosis along with risks, benefits, complications, and alternatives of treatment including labs, imaging and other studies/treatment targets and goals. She verbalized understanding of instructions and counseling. Return if symptoms worsen or fail to improve. Medical decision making of moderate complexity.

## 2022-09-01 ENCOUNTER — PATIENT MESSAGE (OUTPATIENT)
Dept: FAMILY MEDICINE CLINIC | Age: 37
End: 2022-09-01

## 2022-09-01 DIAGNOSIS — N81.10 ACQUIRED FEMALE BLADDER PROLAPSE: Primary | ICD-10-CM

## 2022-09-01 LAB — URINE CULTURE, ROUTINE: NORMAL

## 2022-09-01 NOTE — TELEPHONE ENCOUNTER
From: Martinez Moshe  To:  Mathew Ohm  Sent: 9/1/2022 3:16 PM EDT  Subject: Bladder    Good afternoon I have been doing some thinking and discussing things with my family and I was wondering who I would need to discuss having this prolapsed bladder surgery with would it be the gynecologist? because I have an appointment with them Wednesday, Please let me know or would you need to refer me to somebody else I would like to get the process moving if its going to make my life easier thank you

## 2022-09-02 LAB
C. TRACHOMATIS DNA ,URINE: NEGATIVE
N. GONORRHOEAE DNA, URINE: NEGATIVE

## 2022-09-07 ENCOUNTER — OFFICE VISIT (OUTPATIENT)
Dept: UROGYNECOLOGY | Age: 37
End: 2022-09-07
Payer: COMMERCIAL

## 2022-09-07 VITALS
OXYGEN SATURATION: 99 % | HEART RATE: 91 BPM | TEMPERATURE: 98.2 F | RESPIRATION RATE: 16 BRPM | SYSTOLIC BLOOD PRESSURE: 94 MMHG | DIASTOLIC BLOOD PRESSURE: 65 MMHG

## 2022-09-07 DIAGNOSIS — N39.41 URGE INCONTINENCE: ICD-10-CM

## 2022-09-07 DIAGNOSIS — N81.11 CYSTOCELE, MIDLINE: Primary | ICD-10-CM

## 2022-09-07 DIAGNOSIS — N39.3 STRESS INCONTINENCE: ICD-10-CM

## 2022-09-07 DIAGNOSIS — R35.0 URINARY FREQUENCY: ICD-10-CM

## 2022-09-07 LAB
BILIRUBIN, POC: NORMAL
BLOOD URINE, POC: NORMAL
CLARITY, POC: CLEAR
COLOR, POC: YELLOW
EMPTY COUGH STRESS TEST: NORMAL
FIRST SENSATION: 40 CC
FULL COUGH STRESS TEST: NORMAL
GLUCOSE URINE, POC: NORMAL
KETONES, POC: NORMAL
LEUKOCYTE EST, POC: NORMAL
MAX SENSATION: 130 CC
NITRATE, URINE POC: NORMAL
NITRITE, POC: NORMAL
PH, POC: 6.5
POST VOID RESIDUAL (PVR): 50 ML
PROTEIN, POC: NORMAL
RBC URINE, POC: NORMAL
SECOND SENSATION: 90 CC
SPASM: NORMAL
SPECIFIC GRAVITY, POC: 1.01
UROBILINOGEN, POC: NORMAL
WBC URINE, POC: NORMAL

## 2022-09-07 PROCEDURE — 51725 SIMPLE CYSTOMETROGRAM: CPT | Performed by: OBSTETRICS & GYNECOLOGY

## 2022-09-07 PROCEDURE — 81002 URINALYSIS NONAUTO W/O SCOPE: CPT | Performed by: OBSTETRICS & GYNECOLOGY

## 2022-09-07 PROCEDURE — 99203 OFFICE O/P NEW LOW 30 MIN: CPT | Performed by: OBSTETRICS & GYNECOLOGY

## 2022-09-07 NOTE — PROGRESS NOTES
9/7/2022      HPI:     Name: Kaylin Blum  YOB: 1985    CC: Patient is a 40 y.o. female who is seen in consultation from LINA Eller   for evaluation of  urinary incontinence . HPI:  Bladder control problem: Yes   How many months have you had a bladder problem? 4 years  Do you use pads to absorb lost urine? Yes. If yes how many pads do you wear a day? 8-10    How many trips do you make to the bathroom during the day? 5-6  How many times do you wake at night to go to the bathroom? 2-3  Do you ever wet the bed while asleep? Yes  Are there times when you cannot make it to the bathroom on time? Yes  Does sound, sight, or feel of running water cause you to lose urine? No  How many ounces of liquid do you consume daily? 32 oz  How many drinks containing caffeine do you consume daily? 0  Which best describes urine loss: (Check all that apply)  [x] I lose urine during coughing, sneezing, running, lifting  [x] I lose urine with changes in posture, standing, walking  [] I lose urine continuously such that I am constantly wet  [x] I have sudden, urgent needs without the ability to make it to the bathroom  Have you seen a physician for complaints of urine loss? Yes If yes, who? Nixon Hernandez NP  Have you taken medication to prevent urine loss? No   Bladder emptying problems:  Yes   How long have you had bladder emptying problems? 4 years  Do you notice any dribbling of urine when you stand after passing urine? Yes  Do you usually have difficulty starting your urine stream? Yes  Do you have to assume abnormal positions to urinate? No   Do you have to strain to empty your bladder? Yes  Do you feel as if your bladder is empty after passing urine? No  Is your urine flow: intermittent   Prolapse/Vaginal Support problems: Yes   Do you notice a bulge? Yes  How long have you had a protrusion or bulge? 4 years  Are your symptoms worse at the end of the day or after for prolonged periods?  Yes  Do you push the protrusion back to help with a bowel movement or to empty your bladder? Yes, recently started to last week   Have you ever used a pessary (plastic support device) for this problem? No  Bowel problem(s): No  Sexual History:  reports that she is not currently sexually active. Pelvic Pain:  Yes   Where is your pain? Pelvic area, Vagina, and Lower Abdomen  How long have you had pelvic pain? 1 months  Is your pain relieved by bladder emptying? No  Do you have pain with urination? Yes  Does anything relieve the pain? No        Ob/Gyn History:    OB History   No obstetric history on file. Past Medical History:   Past Medical History:   Diagnosis Date    Hepatitis C     asymptomatic    IV drug abuse ([facility] [address]     Ovarian cyst      Past Surgical History:   Past Surgical History:   Procedure Laterality Date    LAPAROSCOPY  2/10/16    diagnostic laparoscopy, KTP laser of endometriosis     LAPAROSCOPY  2017    for endometriosis    TUBAL LIGATION       Allergies: Allergies   Allergen Reactions    Penicillins Hives     Current Medications:  Current Outpatient Medications   Medication Sig Dispense Refill    metroNIDAZOLE (FLAGYL) 500 MG tablet Take 1 tablet by mouth 2 times daily for 7 days (Patient not taking: Reported on 9/7/2022) 14 tablet 0     No current facility-administered medications for this visit.      Social History:   Social History     Socioeconomic History    Marital status: Legally      Spouse name: Not on file    Number of children: Not on file    Years of education: Not on file    Highest education level: Not on file   Occupational History    Not on file   Tobacco Use    Smoking status: Former     Packs/day: 0.25     Types: Cigarettes     Quit date: 12/15/2018     Years since quitting: 3.7    Smokeless tobacco: Former    Tobacco comments:     Not smoking at all now   Vaping Use    Vaping Use: Not on file   Substance and Sexual Activity    Alcohol use: No    Drug use: Not Currently Types: IV     Comment: Been clean since 11/12/2013    Sexual activity: Not Currently   Other Topics Concern    Not on file   Social History Narrative    Not on file     Social Determinants of Health     Financial Resource Strain: Not on file   Food Insecurity: Not on file   Transportation Needs: Not on file   Physical Activity: Not on file   Stress: Not on file   Social Connections: Not on file   Intimate Partner Violence: Not on file   Housing Stability: Not on file     Family History:   Family History   Problem Relation Age of Onset    Substance Abuse Mother     Mental Illness Mother     Heart Disease Maternal Grandmother     Emphysema Maternal Grandmother     Diabetes Maternal Grandmother     Heart Disease Paternal Grandmother     Diabetes Paternal Grandfather      Review of Systems:  Review of Systems   Constitutional:  Positive for fatigue. Genitourinary:  Positive for difficulty urinating, enuresis, frequency, pelvic pain, vaginal discharge and vaginal pain. Skin:  Positive for rash. All other systems reviewed and are negative. Objective:     Vital Signs  Vitals:    09/07/22 1332   BP: 94/65   Pulse: 91   Resp: 16   Temp: 98.2 °F (36.8 °C)   SpO2: 99%     Physical Exam  Physical Exam  HENT:      Head: Normocephalic and atraumatic. Eyes:      Conjunctiva/sclera: Conjunctivae normal.   Pulmonary:      Effort: Pulmonary effort is normal.   Abdominal:      Palpations: Abdomen is soft. Musculoskeletal:      Cervical back: Normal range of motion and neck supple. Skin:     General: Skin is warm and dry. Neurological:      Mental Status: She is alert and oriented to person, place, and time. Office Fill Study/Urine Dip:     Using sterile technique a manometry catheter was placed. Patient's bladder was filled with sterile water by gravity. Capacity and storage volumes were measured. Spasms assessed. Catheter was removed. Stress urinary incontinence was assessed.     Results for POC orders cystocele and rectocele today. She does however have an empty cough stress test indicative of intrinsic sphincter deficiency. We talked about this today and I suggested that she move forward with a suburethral sling. I gave her handouts on this and karina a picture of her condition. She is very upset about the amount of leakage that she has. She is going to follow-up for urodynamics and cystourethroscopy. She is engaged currently but is not planning on him having any children in the future. She does have 3 children currently. Orders Placed This Encounter   Procedures    POCT Urinalysis no Micro    Cystometrogram       No orders of the defined types were placed in this encounter.       Claudy Paris MD

## 2022-09-13 LAB
HPV COMMENT: NORMAL
HPV TYPE 16: NOT DETECTED
HPV TYPE 18: NOT DETECTED
HPVOH (OTHER TYPES): NOT DETECTED

## 2022-09-26 ENCOUNTER — PATIENT MESSAGE (OUTPATIENT)
Dept: FAMILY MEDICINE CLINIC | Age: 37
End: 2022-09-26

## 2022-10-03 ENCOUNTER — PROCEDURE VISIT (OUTPATIENT)
Dept: UROGYNECOLOGY | Age: 37
End: 2022-10-03
Payer: COMMERCIAL

## 2022-10-03 VITALS
OXYGEN SATURATION: 98 % | SYSTOLIC BLOOD PRESSURE: 108 MMHG | HEART RATE: 75 BPM | DIASTOLIC BLOOD PRESSURE: 74 MMHG | TEMPERATURE: 98.3 F | RESPIRATION RATE: 14 BRPM

## 2022-10-03 DIAGNOSIS — N39.3 STRESS INCONTINENCE: ICD-10-CM

## 2022-10-03 DIAGNOSIS — R35.0 URINARY FREQUENCY: Primary | ICD-10-CM

## 2022-10-03 LAB
BILIRUBIN, POC: NORMAL
BLOOD URINE, POC: NORMAL
CLARITY, POC: CLEAR
COLOR, POC: YELLOW
GLUCOSE URINE, POC: NORMAL
KETONES, POC: NORMAL
LEUKOCYTE EST, POC: NORMAL
NITRITE, POC: NORMAL
PH, POC: 6.5
PROTEIN, POC: NORMAL
SPECIFIC GRAVITY, POC: 1.01
UROBILINOGEN, POC: NORMAL

## 2022-10-03 PROCEDURE — 51729 CYSTOMETROGRAM W/VP&UP: CPT | Performed by: OBSTETRICS & GYNECOLOGY

## 2022-10-03 PROCEDURE — 51797 INTRAABDOMINAL PRESSURE TEST: CPT | Performed by: OBSTETRICS & GYNECOLOGY

## 2022-10-03 PROCEDURE — 81002 URINALYSIS NONAUTO W/O SCOPE: CPT | Performed by: OBSTETRICS & GYNECOLOGY

## 2022-10-03 PROCEDURE — 51784 ANAL/URINARY MUSCLE STUDY: CPT | Performed by: OBSTETRICS & GYNECOLOGY

## 2022-10-03 PROCEDURE — 51741 ELECTRO-UROFLOWMETRY FIRST: CPT | Performed by: OBSTETRICS & GYNECOLOGY

## 2022-10-03 NOTE — PROGRESS NOTES
Urodynamic Procedure Note    Barby Peoples  1985    Urodynamicist: Dr. Lange Flank    Equipment: Ruth Mcrae    Brief History/Indication:    Patient is a 40 y.o. female with subjective complaints of stress, urge incontinence and urinary frequency for a urodynamic evaluation. Cystometrogram   WBC Urine, POC   Date Value Ref Range Status   09/07/2022 neg  Final     RBC, UA   Date Value Ref Range Status   01/12/2022 79 (H) 0 - 4 /HPF Final     RBC Urine, POC   Date Value Ref Range Status   09/07/2022 neg  Final     Nitrate, UA POC   Date Value Ref Range Status   09/07/2022 neg  Final     post void residual   Date Value Ref Range Status   09/07/2022 50 ml Final     FIRST SENSATION   Date Value Ref Range Status   09/07/2022 40 cc Final     SECOND SENSATION   Date Value Ref Range Status   09/07/2022 90 cc Final     MAX SENSATION   Date Value Ref Range Status   09/07/2022 130 cc Final     EMPTY COUGH STRESS TEST   Date Value Ref Range Status   09/07/2022 pos  Final     FULL COUGH STRESS TEST   Date Value Ref Range Status   09/07/2022 pos  Final     SPASM   Date Value Ref Range Status   09/07/2022 neg  Final       Pelvic Organ Prolapse Quantification  Anterior Wall (Aa): -2   Anterior Wall (Ba): -2   Cervix or Cuff (C): -4     Genital Hiatus (gh): 3   Perineal Body (pb): 4   Total Vaginal Length (tvl): 8     Posterior Wall (Ap): -2   Posterior Wall (Bp): -2   No data recorded     No data recorded        Procedure: The Patient was taken to the Urodynamics suite and a free urine flow was obtained followed by catheterization for residual urine. A double-lumen urodynamic catheter was introduced to the bladder for measuring bladder pressure, and for filling. EMG patch electrodes were placed perianally for recording activity of the anal sphincter. A vaginal catheter was inserted to record the abdominal pressure.  The entire process was displayed and analyzed using the [employer] Urodynamic machine and software.     Findings:   Results for POC orders placed in visit on 10/03/22   POCT Urinalysis no Micro   Result Value Ref Range    Color, UA yellow     Clarity, UA clear     Glucose, UA POC neg     Bilirubin, UA neg     Ketones, UA neg     Spec Grav, UA 1.015     Blood, UA POC neg     pH, UA 6.5     Protein, UA POC neg     Urobilinogen, UA neg     Leukocytes, UA neg     Nitrite, UA neg        Uroflow:  Patient was able to void for Uroflow    Voided Volume: 141.1 ml  PVR: 20 ml  Max Flow: 22.6 ml/sec with an average flow rate of 6.3 ml/sec    CMG:  First sensation: 119 ml  First desire: 131 ml  Strong desire: 141 ml  Max capacity: 255 ml  Uninhibited detrusor contraction: No     Leak Point Pressures:  150 ml with  negative  Sitting cough, negative  Sitting valsalva, without reduction    190 cmH2O @ 255 ml (max) with positive  Sitting cough,  negative  Sitting valsalva, without reduction    Pressure voiding study:  Patient was able to void with catheters in place to obtain pressure voiding study    Maximum detrusor pressure at peak flow 17cm/H2O Peak flow rate 14ml/sec    Maximum abd pressure at peak flow 47cm/H2O Flow time 49sec    Maximum vesical pressure at peak flow 64cm/H2O    Voided volume 231.5 ml and Residual: (Max cap-voided) 23ml    MUCP:  First Pull 107  Second Pull 109    EMG: does correlate    Assessment:  Clinical Diagnosis: GERARDO    Plan: cyssto  Discuss at next visit: sling consent  Ban Martin MD

## 2022-10-12 ENCOUNTER — PROCEDURE VISIT (OUTPATIENT)
Dept: UROGYNECOLOGY | Age: 37
End: 2022-10-12
Payer: COMMERCIAL

## 2022-10-12 ENCOUNTER — PATIENT MESSAGE (OUTPATIENT)
Dept: FAMILY MEDICINE CLINIC | Age: 37
End: 2022-10-12

## 2022-10-12 VITALS
RESPIRATION RATE: 16 BRPM | SYSTOLIC BLOOD PRESSURE: 96 MMHG | DIASTOLIC BLOOD PRESSURE: 60 MMHG | TEMPERATURE: 98.2 F | OXYGEN SATURATION: 98 % | HEART RATE: 71 BPM

## 2022-10-12 DIAGNOSIS — R35.0 URINARY FREQUENCY: ICD-10-CM

## 2022-10-12 DIAGNOSIS — N39.3 STRESS INCONTINENCE: Primary | ICD-10-CM

## 2022-10-12 DIAGNOSIS — N39.41 URGE INCONTINENCE: ICD-10-CM

## 2022-10-12 DIAGNOSIS — G25.81 RLS (RESTLESS LEGS SYNDROME): Primary | ICD-10-CM

## 2022-10-12 PROCEDURE — 52285 CYSTOSCOPY AND TREATMENT: CPT | Performed by: OBSTETRICS & GYNECOLOGY

## 2022-10-12 PROCEDURE — 99214 OFFICE O/P EST MOD 30 MIN: CPT | Performed by: OBSTETRICS & GYNECOLOGY

## 2022-10-12 RX ORDER — SODIUM CHLORIDE 9 MG/ML
INJECTION, SOLUTION INTRAVENOUS PRN
OUTPATIENT
Start: 2022-10-12

## 2022-10-12 RX ORDER — SODIUM CHLORIDE 0.9 % (FLUSH) 0.9 %
5-40 SYRINGE (ML) INJECTION EVERY 12 HOURS SCHEDULED
OUTPATIENT
Start: 2022-10-12

## 2022-10-12 RX ORDER — SODIUM CHLORIDE 0.9 % (FLUSH) 0.9 %
5-40 SYRINGE (ML) INJECTION PRN
OUTPATIENT
Start: 2022-10-12

## 2022-10-12 NOTE — PROGRESS NOTES
10/12/2022     HPI:     Name: Bishnu Kat  YOB: 1985    CC: Bishnu Kat is a 40 y.o. female presenting for an evaluation of  urinary incontinence . HPI: How long have you had this problem? years  Please rate the severity of your problem: moderate  Anything make it better? Ob/Gyn History:    OB History   No obstetric history on file. Past Medical History:   Past Medical History:   Diagnosis Date    Hepatitis C     asymptomatic    IV drug abuse ([address]     Ovarian cyst      Past Surgical History:   Past Surgical History:   Procedure Laterality Date    LAPAROSCOPY  2/10/16    diagnostic laparoscopy, KTP laser of endometriosis     LAPAROSCOPY  2017    for endometriosis    TUBAL LIGATION       Current Medications:  No current outpatient medications on file. No current facility-administered medications for this visit. Allergies:    Allergies   Allergen Reactions    Penicillins Hives     Social History:   Social History     Socioeconomic History    Marital status: Legally      Spouse name: Not on file    Number of children: Not on file    Years of education: Not on file    Highest education level: Not on file   Occupational History    Not on file   Tobacco Use    Smoking status: Former     Packs/day: 0.25     Types: Cigarettes     Quit date: 12/15/2018     Years since quitting: 3.8    Smokeless tobacco: Former    Tobacco comments:     Not smoking at all now   Vaping Use    Vaping Use: Not on file   Substance and Sexual Activity    Alcohol use: No    Drug use: Not Currently     Types: IV     Comment: Been clean since 11/12/2013    Sexual activity: Not Currently   Other Topics Concern    Not on file   Social History Narrative    Not on file     Social Determinants of Health     Financial Resource Strain: Not on file   Food Insecurity: Not on file   Transportation Needs: Not on file   Physical Activity: Not on file   Stress: Not on file   Social Connections: Not on file Intimate Partner Violence: Not on file   Housing Stability: Not on file     Family History:   Family History   Problem Relation Age of Onset    Substance Abuse Mother     Mental Illness Mother     Heart Disease Maternal Grandmother     Emphysema Maternal Grandmother     Diabetes Maternal Grandmother     Heart Disease Paternal Grandmother     Diabetes Paternal Grandfather          Objective:     Vital Signs  Vitals:    10/12/22 1301   BP: 96/60   Pulse: 71   Resp: 16   Temp: 98.2 °F (36.8 °C)   SpO2: 98%      Physical Exam  Physical Exam  HENT:      Head: Normocephalic and atraumatic. Eyes:      Conjunctiva/sclera: Conjunctivae normal.   Pulmonary:      Effort: Pulmonary effort is normal.   Abdominal:      Palpations: Abdomen is soft. Musculoskeletal:      Cervical back: Normal range of motion and neck supple. Skin:     General: Skin is warm and dry. Neurological:      Mental Status: She is alert and oriented to person, place, and time. Procedure: The urethral was anesthesized with topical lidocaine jelly and dilated up to a #14 Iraqi. A 0-degree urethroscope was used to examine the urethra. A 70-degree cystoscope was used to evaluate the bladder. FINDINGS:  1. Urethra was normal  2. Bladder had trabeculations mild  3. Trigone appeared Normal  4. Ureters illustrated bilateral efflux  5. Patient exhibited spasms during the study no    Cough stress test: Bladder filled to 200 mL. Patient did leak with cough stress test.    No results found for this visit on 10/12/22. Assessment/Plan:     [facility] is a 40 y.o. female with   1. Stress incontinence    2. Urinary frequency    3. Urge incontinence    Old records reviewed outside records reviewed, urodynamics reviewed, cystourethroscopy was reviewed    The patient was counseled on surgical and non-surgical options. The patient elected to move forward with surgery because of worsening symptoms.   The risks and benefits of surgery including but not limited to bleeding, infection, injury to bowel, bladder, ureter, or other internal organs, transfusion, pain, bowel dysfunction, urinary incontinence, and sexual dysfunction were discussed at length. All questions were answered to the patients satisfaction. The patient elected to undergo synthetic mid-urethral sling and cystourethroscopy. The risk and benefits of synthetic material, including mesh, were explained to the patient and all questions were answered. Preop labs were ordered  Orders Placed This Encounter   Procedures    Initiate PAT Protocol     Standing Status:   Future     Standing Expiration Date:   12/11/2022    OK CYSTOSCOPY,RX FEMALE URETHRAL SYND       No orders of the defined types were placed in this encounter.       Dusty Pool MD

## 2022-10-12 NOTE — LETTER
[address]  [address] [facility]  Phone: [phone]  Fax: [phone]    Sunitha Devine MD    October 12, 2022     Jacinto Hill, APRN - [address] [address]    Patient: Peggy Sewell   MR Number: [id number]   YOB: 1985   Date of Visit: 10/12/2022       Dear Jacinto Hill: Thank you for referring Kurt Granados to me for evaluation/treatment. Below are the relevant portions of my assessment and plan of care. If you have questions, please do not hesitate to call me. I look forward to following Claribel along with you. Sincerely,      Sunitha Devine MD   10/12/2022     HPI:     Name: Peggy Sewell  YOB: 1985    CC: Peggy Sewell is a 40 y.o. female presenting for an evaluation of  urinary incontinence . HPI: How long have you had this problem? years  Please rate the severity of your problem: moderate  Anything make it better? Ob/Gyn History:    OB History   No obstetric history on file. Past Medical History:   Past Medical History:   Diagnosis Date    Hepatitis C     asymptomatic    IV drug abuse ([facility] [address]     Ovarian cyst      Past Surgical History:   Past Surgical History:   Procedure Laterality Date    LAPAROSCOPY  2/10/16    diagnostic laparoscopy, KTP laser of endometriosis     LAPAROSCOPY  2017    for endometriosis    TUBAL LIGATION       Current Medications:  No current outpatient medications on file. No current facility-administered medications for this visit. Allergies:    Allergies   Allergen Reactions    Penicillins Hives     Social History:   Social History     Socioeconomic History    Marital status: Legally      Spouse name: Not on file    Number of children: Not on file    Years of education: Not on file    Highest education level: Not on file   Occupational History    Not on file   Tobacco Use    Smoking status: Former     Packs/day: 0.25     Types: Cigarettes     Quit date: 12/15/2018     Years since quitting: 3.8    Smokeless tobacco: Former    Tobacco comments:     Not smoking at all now   [employer] Vaping Use: Not on file   Substance and Sexual Activity    Alcohol use: No    Drug use: Not Currently     Types: IV     Comment: Been clean since 11/12/2013    Sexual activity: Not Currently   Other Topics Concern    Not on file   Social History Narrative    Not on file     Social Determinants of Health     Financial Resource Strain: Not on file   Food Insecurity: Not on file   Transportation Needs: Not on file   Physical Activity: Not on file   Stress: Not on file   Social Connections: Not on file   Intimate Partner Violence: Not on file   Housing Stability: Not on file     Family History:   Family History   Problem Relation Age of Onset    Substance Abuse Mother     Mental Illness Mother     Heart Disease Maternal Grandmother     Emphysema Maternal Grandmother     Diabetes Maternal Grandmother     Heart Disease Paternal Grandmother     Diabetes Paternal Grandfather          Objective:     Vital Signs  Vitals:    10/12/22 1301   BP: 96/60   Pulse: 71   Resp: 16   Temp: 98.2 °F (36.8 °C)   SpO2: 98%      Physical Exam  Physical Exam  HENT:      Head: Normocephalic and atraumatic. Eyes:      Conjunctiva/sclera: Conjunctivae normal.   Pulmonary:      Effort: Pulmonary effort is normal.   Abdominal:      Palpations: Abdomen is soft. Musculoskeletal:      Cervical back: Normal range of motion and neck supple. Skin:     General: Skin is warm and dry. Neurological:      Mental Status: She is alert and oriented to person, place, and time. Procedure: The urethral was anesthesized with topical lidocaine jelly and dilated up to a #14 Thai. A 0-degree urethroscope was used to examine the urethra. A 70-degree cystoscope was used to evaluate the bladder. FINDINGS:  1. Urethra was normal  2. Bladder had trabeculations mild  3. Trigone appeared Normal  4. Ureters illustrated bilateral efflux  5. Patient exhibited spasms during the study no    Cough stress test: Bladder filled to 200 mL. Patient did leak with cough stress test.    No results found for this visit on 10/12/22. Assessment/Plan:     Elida Noriega is a 40 y.o. female with   1. Stress incontinence    Old records reviewed outside records reviewed, urodynamics reviewed, cystourethroscopy was reviewed    The patient was counseled on surgical and non-surgical options. The patient elected to move forward with surgery because of worsening symptoms. The risks and benefits of surgery including but not limited to bleeding, infection, injury to bowel, bladder, ureter, or other internal organs, transfusion, pain, bowel dysfunction, urinary incontinence, and sexual dysfunction were discussed at length. All questions were answered to the patients satisfaction. The patient elected to undergo synthetic mid-urethral sling and cystourethroscopy. The risk and benefits of synthetic material, including mesh, were explained to the patient and all questions were answered. Preop labs were ordered  No orders of the defined types were placed in this encounter. No orders of the defined types were placed in this encounter.       Reid Milner MD

## 2022-10-13 RX ORDER — ROPINIROLE 0.25 MG/1
0.25 TABLET, FILM COATED ORAL NIGHTLY
Qty: 60 TABLET | Refills: 0 | Status: SHIPPED | OUTPATIENT
Start: 2022-10-13

## 2022-10-13 NOTE — TELEPHONE ENCOUNTER
From: Nathen Pizano  To: Simpson Oiler  Sent: 10/12/2022 6:40 PM EDT  Subject: Restless legs    Good evening! I was wondering if there is any way I could get my requip called in for my legs they are bothering me again. I have noticed that the more stressed I am is when they start to hurt! Please let me know and if you can please send to [facility] on [address]!

## 2022-10-31 ENCOUNTER — OFFICE VISIT (OUTPATIENT)
Dept: FAMILY MEDICINE CLINIC | Age: 37
End: 2022-10-31
Payer: COMMERCIAL

## 2022-10-31 VITALS
TEMPERATURE: 98.4 F | WEIGHT: 172.8 LBS | HEART RATE: 69 BPM | OXYGEN SATURATION: 98 % | SYSTOLIC BLOOD PRESSURE: 110 MMHG | DIASTOLIC BLOOD PRESSURE: 80 MMHG | BODY MASS INDEX: 30.61 KG/M2

## 2022-10-31 DIAGNOSIS — Z01.818 PRE-OP EXAMINATION: Primary | ICD-10-CM

## 2022-10-31 PROCEDURE — 99214 OFFICE O/P EST MOD 30 MIN: CPT | Performed by: NURSE PRACTITIONER

## 2022-10-31 NOTE — PROGRESS NOTES
Preoperative Consultation    Joshua Palacios  YOB: 1985    This patient presents to the office today for a preoperative consultation at the request of surgeon, Aurora London, who plans on performing Retropubic Sling and cystoscopy on November 9 at [facility].      Planned anesthesia: General   Known anesthesia problems: None   Bleeding risk: No recent or remote history of abnormal bleeding  Personal or FH ofDVT/PE: No      Patient Active Problem List   Diagnosis   (none) - all problems resolved or deleted     Past Surgical History:   Procedure Laterality Date    LAPAROSCOPY  2/10/16    diagnostic laparoscopy, KTP laser of endometriosis     LAPAROSCOPY  2017    for endometriosis    TUBAL LIGATION         Allergies   Allergen Reactions    Penicillins Hives     Outpatient Medications Marked as Taking for the 10/31/22 encounter (Office Visit) with LINA Angela CNP   Medication Sig Dispense Refill    rOPINIRole (REQUIP) 0.25 MG tablet Take 1 tablet by mouth nightly 60 tablet 0          Social History     Tobacco Use    Smoking status: Former     Packs/day: 0.25     Types: Cigarettes     Quit date: 12/15/2018     Years since quitting: 3.8    Smokeless tobacco: Former    Tobacco comments:     Not smoking at all now   Substance Use Topics    Alcohol use: No     Family History   Problem Relation Age of Onset    Substance Abuse Mother     Mental Illness Mother     Heart Disease Maternal Grandmother     Emphysema Maternal Grandmother     Diabetes Maternal Grandmother     Heart Disease Paternal Grandmother     Diabetes Paternal Grandfather        Review of Systems  A comprehensive review of systems was negative except for what was noted in the HPI.      Recent Labs:  CBC:   Lab Results   Component Value Date/Time    WBC 6.0 01/12/2022 10:14 AM    HGB 13.0 01/12/2022 10:14 AM    HCT 38.5 01/12/2022 10:14 AM    MCH 29.7 01/12/2022 10:14 AM    MCHC 33.7 01/12/2022 10:14 AM    RDW 13.8 01/12/2022 10:14 AM     01/12/2022 10:14 AM    MPV 8.5 01/12/2022 10:14 AM     CMP:   Lab Results   Component Value Date/Time     01/12/2022 10:14 AM    K 5.2 01/12/2022 10:14 AM    K 4.1 07/29/2021 10:31 AM     01/12/2022 10:14 AM    CO2 28 01/12/2022 10:14 AM    ANIONGAP 9 01/12/2022 10:14 AM    GLUCOSE 104 01/12/2022 10:14 AM    BUN 12 01/12/2022 10:14 AM    CREATININE 1.1 01/12/2022 10:14 AM    GFRAA >60 01/12/2022 10:14 AM    GFRAA >60 01/18/2012 11:36 PM    CALCIUM 9.1 01/12/2022 10:14 AM    PROT 8.0 11/12/2021 01:20 PM    LABALBU 4.2 11/12/2021 01:20 PM    AGRATIO 1.1 11/12/2021 01:20 PM    BILITOT 0.3 11/12/2021 01:20 PM    ALKPHOS 109 11/12/2021 01:20 PM     11/12/2021 01:20 PM     11/12/2021 01:20 PM    GLOB 3.7 07/29/2021 10:31 AM       HBA1C:  Lab Results   Component Value Date/Time    LABA1C 5.1 07/16/2019 07:45 AM    EAG 99.7 07/16/2019 07:45 AM       Objective:     /80 (Site: Left Upper Arm, Position: Sitting, Cuff Size: Medium Adult)   Pulse 69   Temp 98.4 °F (36.9 °C)   Wt 172 lb 12.8 oz (78.4 kg)   SpO2 98%   BMI 30.61 kg/m²  Weight: 172 lb 12.8 oz (78.4 kg)   Physical Exam  Constitutional:       General: She is not in acute distress. Appearance: She is well-developed. HENT:      Head: Normocephalic and atraumatic. Cardiovascular:      Rate and Rhythm: Normal rate and regular rhythm. Heart sounds: Normal heart sounds, S1 normal and S2 normal.   Pulmonary:      Effort: Pulmonary effort is normal. No respiratory distress. Breath sounds: Normal breath sounds. Skin:     General: Skin is warm and dry. Neurological:      Mental Status: She is alert and oriented to person, place, and time. Psychiatric:         Thought Content: Thought content normal.         Judgment: Judgment normal.     EKG Interpretation:  N/A.     Lab Review: N/A     Assessment:       Claribel was seen today for pre-op exam.    Diagnoses and all orders for this visit:    Pre-op examination    40 y.o. patient  approved for Surgery       Plan:     1. Preoperative workup as follows: none  2. Change in medication regimen before surgery:None  3. No contraindications to planned surgery    Note electronically signed by provider.

## 2022-11-01 NOTE — FLOWSHEET NOTE
Preoperative Screening for Elective Surgery/Invasive Procedures While COVID-19 present in the community     Have you tested positive or have been told to self-isolate for COVID-19 like symptoms within the past 28 days? Do you currently have any of the following symptoms? Fever >100.0 F or 99.9 F in immunocompromised patients? New onset cough, shortness of breath or difficulty breathing? New onset sore throat, myalgia (muscle aches and pains), headache, loss of taste/smell or diarrhea? Have you had a potential exposure to COVID-19 within the past 14 days by:  Close contact with a confirmed case? Close contact with a healthcare worker,  or essential infrastructure worker (grocery store, [employer], gas station, public utilities or transportation)? Do you reside in a congregate setting such as; skilled nursing facility, adult home, correctional facility, homeless shelter or other institutional setting? Have you had recent travel to a known COVID-19 hotspot? No to all above       * Admitted with diarrhea? [] YES    [x]  NO     *Prior history of C-Diff. In last 3 months? [] YES    [x]  NO     *Antibiotic use in the past 6-8 weeks? [x]  NO    []  YES      If yes, which: REASON_________________     *Prior hospitalization or nursing home in the last month? []  YES    [x]  NO     [facility] FIRST. .call before you fall    [address] [facility] [place] time__11/9/22 0700__________        Surgery time____0900________    Do not eat or drink anything after 12:00 midnight prior to your surgery. This includes water chewing gum, mints and ice chips- the Day of Surgery. You may brush your teeth and gargle the morning of your surgery, but do not swallow the water     Please see your family doctor/pediatrician for a history and physical and/or questions concerning medications. Bring any test results/reports from your physicians office.    If you are under the care of a heart doctor or specialist doctor, please be aware that you may be asked to them for clearance    You may be asked to stop blood thinners such as Coumadin, Plavix, Fragmin, Lovenox, etc., or any anti-inflammatories such as:  Aspirin, Ibuprofen, Advil, Naproxen prior to your surgery. We also ask that you stop any OTC medications such as fish oil, vitamin E, glucosamine, garlic, Multivitamins, COQ 10, etc.    We ask that you do not smoke 24 hours prior to surgery  We ask that you do not  drink any alcoholic beverages 24 hours prior to surgery     You must make arrangements for a responsible adult to take you home after your surgery. For your safety you will not be allowed to leave alone or drive yourself home. Your surgery will be cancelled if you do not have a ride home. Also for your safety, it is strongly suggested that someone stay with you the first 24 hours after your surgery. A parent or legal guardian must accompany a child scheduled for surgery and plan to stay at the hospital until the child is discharged. Please do not bring other children with you. For your comfort, please wear simple loose fitting clothing to the hospital.  Please do not bring valuables. Do not wear any make-up or nail polish on your fingers or toes. For your safety, please do not wear any jewelry or body piercing's on the day of surgery. All jewelry must be removed. If you have dentures, they will be removed before going to operating room. For your convenience, we will provide you with a container. If you wear contact lenses or glasses, they will be removed, please bring a case for them. If you have a living will and a durable power of  for healthcare, please bring in a copy.      As part of our patient safety program to minimize surgical site infections, we ask you to do the following:    Please notify your surgeon if you develop any illness between         now and the day of your surgery. This includes a cough, cold, fever, sore throat, nausea,         or vomiting, and diarrhea, etc.   Please notify your surgeon if you experience dizziness, shortness         of breath or blurred vision between now and the time of your surgery. Do not shave your operative site 96 hours prior to surgery. For face and neck surgery, men may use an electric razor 48 hours   prior to surgery. You may shower the night before surgery or the morning of   your surgery with an antibacterial soap. You will need to bring a photo ID and insurance card     If you use a C-pap or Bi-pap machine, please bring your machine with you to the hospital     Our goal is to provide you with excellent care, therefore, visitors will be limited to so that we may focus on providing this care for you. Please contact your surgeon office, if you have any further questions. [facility] phone number:  [phone] [facility] fax number:  [phone]    Please note these are generalized instructions for all surgical cases, you may be provided with more specific instructions according to your surgery.

## 2022-11-08 ENCOUNTER — ANESTHESIA EVENT (OUTPATIENT)
Dept: OPERATING ROOM | Age: 37
End: 2022-11-08
Payer: COMMERCIAL

## 2022-11-09 ENCOUNTER — ANESTHESIA (OUTPATIENT)
Dept: OPERATING ROOM | Age: 37
End: 2022-11-09
Payer: COMMERCIAL

## 2022-11-09 ENCOUNTER — HOSPITAL ENCOUNTER (OUTPATIENT)
Age: 37
Setting detail: OUTPATIENT SURGERY
Discharge: HOME | End: 2022-11-09
Attending: OBSTETRICS & GYNECOLOGY | Admitting: OBSTETRICS & GYNECOLOGY
Payer: COMMERCIAL

## 2022-11-09 VITALS
HEIGHT: 63 IN | DIASTOLIC BLOOD PRESSURE: 66 MMHG | HEART RATE: 80 BPM | RESPIRATION RATE: 16 BRPM | WEIGHT: 169.31 LBS | OXYGEN SATURATION: 96 % | SYSTOLIC BLOOD PRESSURE: 111 MMHG | BODY MASS INDEX: 30 KG/M2 | TEMPERATURE: 97.1 F

## 2022-11-09 DIAGNOSIS — G89.18 POSTOPERATIVE PAIN: Primary | ICD-10-CM

## 2022-11-09 PROBLEM — N39.3 GSI (GENUINE STRESS INCONTINENCE), FEMALE: Status: ACTIVE | Noted: 2022-11-09

## 2022-11-09 LAB — PREGNANCY, URINE: NEGATIVE

## 2022-11-09 PROCEDURE — 3600000004 HC SURGERY LEVEL 4 BASE: Performed by: OBSTETRICS & GYNECOLOGY

## 2022-11-09 PROCEDURE — 6360000002 HC RX W HCPCS

## 2022-11-09 PROCEDURE — 2500000003 HC RX 250 WO HCPCS: Performed by: OBSTETRICS & GYNECOLOGY

## 2022-11-09 PROCEDURE — 2709999900 HC NON-CHARGEABLE SUPPLY: Performed by: OBSTETRICS & GYNECOLOGY

## 2022-11-09 PROCEDURE — 7100000011 HC PHASE II RECOVERY - ADDTL 15 MIN: Performed by: OBSTETRICS & GYNECOLOGY

## 2022-11-09 PROCEDURE — 7100000001 HC PACU RECOVERY - ADDTL 15 MIN: Performed by: OBSTETRICS & GYNECOLOGY

## 2022-11-09 PROCEDURE — 84703 CHORIONIC GONADOTROPIN ASSAY: CPT

## 2022-11-09 PROCEDURE — 6370000000 HC RX 637 (ALT 250 FOR IP)

## 2022-11-09 PROCEDURE — 2500000003 HC RX 250 WO HCPCS

## 2022-11-09 PROCEDURE — 3600000014 HC SURGERY LEVEL 4 ADDTL 15MIN: Performed by: OBSTETRICS & GYNECOLOGY

## 2022-11-09 PROCEDURE — 7100000000 HC PACU RECOVERY - FIRST 15 MIN: Performed by: OBSTETRICS & GYNECOLOGY

## 2022-11-09 PROCEDURE — 57288 REPAIR BLADDER DEFECT: CPT | Performed by: OBSTETRICS & GYNECOLOGY

## 2022-11-09 PROCEDURE — 2580000003 HC RX 258: Performed by: OBSTETRICS & GYNECOLOGY

## 2022-11-09 PROCEDURE — 3700000001 HC ADD 15 MINUTES (ANESTHESIA): Performed by: OBSTETRICS & GYNECOLOGY

## 2022-11-09 PROCEDURE — A4217 STERILE WATER/SALINE, 500 ML: HCPCS | Performed by: OBSTETRICS & GYNECOLOGY

## 2022-11-09 PROCEDURE — 6360000002 HC RX W HCPCS: Performed by: OBSTETRICS & GYNECOLOGY

## 2022-11-09 PROCEDURE — 57240 ANTERIOR COLPORRHAPHY: CPT | Performed by: OBSTETRICS & GYNECOLOGY

## 2022-11-09 PROCEDURE — 7100000010 HC PHASE II RECOVERY - FIRST 15 MIN: Performed by: OBSTETRICS & GYNECOLOGY

## 2022-11-09 PROCEDURE — 3700000000 HC ANESTHESIA ATTENDED CARE: Performed by: OBSTETRICS & GYNECOLOGY

## 2022-11-09 PROCEDURE — C1771 REP DEV, URINARY, W/SLING: HCPCS | Performed by: OBSTETRICS & GYNECOLOGY

## 2022-11-09 PROCEDURE — 2580000003 HC RX 258: Performed by: ANESTHESIOLOGY

## 2022-11-09 PROCEDURE — 6360000002 HC RX W HCPCS: Performed by: ANESTHESIOLOGY

## 2022-11-09 DEVICE — SUPRAPUBIC MID-URETHRAL SLING
Type: IMPLANTABLE DEVICE | Site: URETHRA | Status: FUNCTIONAL
Brand: LYNX™ BLUE SYSTEM

## 2022-11-09 RX ORDER — SODIUM CHLORIDE 0.9 % (FLUSH) 0.9 %
5-40 SYRINGE (ML) INJECTION EVERY 12 HOURS SCHEDULED
Status: DISCONTINUED | OUTPATIENT
Start: 2022-11-09 | End: 2022-11-09 | Stop reason: SDUPTHER

## 2022-11-09 RX ORDER — SODIUM CHLORIDE 0.9 % (FLUSH) 0.9 %
5-40 SYRINGE (ML) INJECTION PRN
Status: DISCONTINUED | OUTPATIENT
Start: 2022-11-09 | End: 2022-11-09 | Stop reason: SDUPTHER

## 2022-11-09 RX ORDER — MAGNESIUM HYDROXIDE 1200 MG/15ML
LIQUID ORAL CONTINUOUS PRN
Status: COMPLETED | OUTPATIENT
Start: 2022-11-09 | End: 2022-11-09

## 2022-11-09 RX ORDER — SODIUM CHLORIDE 9 MG/ML
INJECTION, SOLUTION INTRAVENOUS PRN
Status: DISCONTINUED | OUTPATIENT
Start: 2022-11-09 | End: 2022-11-09 | Stop reason: HOSPADM

## 2022-11-09 RX ORDER — LIDOCAINE HYDROCHLORIDE 20 MG/ML
INJECTION, SOLUTION EPIDURAL; INFILTRATION; INTRACAUDAL; PERINEURAL PRN
Status: DISCONTINUED | OUTPATIENT
Start: 2022-11-09 | End: 2022-11-09 | Stop reason: SDUPTHER

## 2022-11-09 RX ORDER — DEXMEDETOMIDINE HYDROCHLORIDE 100 UG/ML
INJECTION, SOLUTION INTRAVENOUS PRN
Status: DISCONTINUED | OUTPATIENT
Start: 2022-11-09 | End: 2022-11-09 | Stop reason: SDUPTHER

## 2022-11-09 RX ORDER — PROPOFOL 10 MG/ML
INJECTION, EMULSION INTRAVENOUS PRN
Status: DISCONTINUED | OUTPATIENT
Start: 2022-11-09 | End: 2022-11-09 | Stop reason: SDUPTHER

## 2022-11-09 RX ORDER — SODIUM CHLORIDE 0.9 % (FLUSH) 0.9 %
5-40 SYRINGE (ML) INJECTION PRN
Status: DISCONTINUED | OUTPATIENT
Start: 2022-11-09 | End: 2022-11-09 | Stop reason: HOSPADM

## 2022-11-09 RX ORDER — OXYCODONE HYDROCHLORIDE 5 MG/1
5 TABLET ORAL PRN
Status: DISCONTINUED | OUTPATIENT
Start: 2022-11-09 | End: 2022-11-09 | Stop reason: HOSPADM

## 2022-11-09 RX ORDER — ONDANSETRON 2 MG/ML
INJECTION INTRAMUSCULAR; INTRAVENOUS PRN
Status: DISCONTINUED | OUTPATIENT
Start: 2022-11-09 | End: 2022-11-09 | Stop reason: SDUPTHER

## 2022-11-09 RX ORDER — SODIUM CHLORIDE 9 MG/ML
INJECTION, SOLUTION INTRAVENOUS CONTINUOUS
Status: DISCONTINUED | OUTPATIENT
Start: 2022-11-09 | End: 2022-11-09 | Stop reason: HOSPADM

## 2022-11-09 RX ORDER — FENTANYL CITRATE 50 UG/ML
INJECTION, SOLUTION INTRAMUSCULAR; INTRAVENOUS PRN
Status: DISCONTINUED | OUTPATIENT
Start: 2022-11-09 | End: 2022-11-09 | Stop reason: SDUPTHER

## 2022-11-09 RX ORDER — ROCURONIUM BROMIDE 10 MG/ML
INJECTION, SOLUTION INTRAVENOUS PRN
Status: DISCONTINUED | OUTPATIENT
Start: 2022-11-09 | End: 2022-11-09 | Stop reason: SDUPTHER

## 2022-11-09 RX ORDER — DEXAMETHASONE SODIUM PHOSPHATE 4 MG/ML
INJECTION, SOLUTION INTRA-ARTICULAR; INTRALESIONAL; INTRAMUSCULAR; INTRAVENOUS; SOFT TISSUE PRN
Status: DISCONTINUED | OUTPATIENT
Start: 2022-11-09 | End: 2022-11-09 | Stop reason: SDUPTHER

## 2022-11-09 RX ORDER — ONDANSETRON 2 MG/ML
4 INJECTION INTRAMUSCULAR; INTRAVENOUS
Status: DISCONTINUED | OUTPATIENT
Start: 2022-11-09 | End: 2022-11-09 | Stop reason: HOSPADM

## 2022-11-09 RX ORDER — OXYCODONE HYDROCHLORIDE AND ACETAMINOPHEN 5; 325 MG/1; MG/1
1 TABLET ORAL EVERY 6 HOURS PRN
Qty: 5 TABLET | Refills: 0 | Status: SHIPPED | OUTPATIENT
Start: 2022-11-09 | End: 2022-11-14

## 2022-11-09 RX ORDER — FENTANYL CITRATE 50 UG/ML
50 INJECTION, SOLUTION INTRAMUSCULAR; INTRAVENOUS EVERY 5 MIN PRN
Status: DISCONTINUED | OUTPATIENT
Start: 2022-11-09 | End: 2022-11-09 | Stop reason: HOSPADM

## 2022-11-09 RX ORDER — MEPERIDINE HYDROCHLORIDE 25 MG/ML
12.5 INJECTION INTRAMUSCULAR; INTRAVENOUS; SUBCUTANEOUS EVERY 5 MIN PRN
Status: DISCONTINUED | OUTPATIENT
Start: 2022-11-09 | End: 2022-11-09 | Stop reason: HOSPADM

## 2022-11-09 RX ORDER — OXYCODONE HYDROCHLORIDE 10 MG/1
10 TABLET ORAL PRN
Status: DISCONTINUED | OUTPATIENT
Start: 2022-11-09 | End: 2022-11-09 | Stop reason: HOSPADM

## 2022-11-09 RX ORDER — SODIUM CHLORIDE 0.9 % (FLUSH) 0.9 %
5-40 SYRINGE (ML) INJECTION EVERY 12 HOURS SCHEDULED
Status: DISCONTINUED | OUTPATIENT
Start: 2022-11-09 | End: 2022-11-09 | Stop reason: HOSPADM

## 2022-11-09 RX ORDER — IBUPROFEN 600 MG/1
600 TABLET ORAL 3 TIMES DAILY
Qty: 15 TABLET | Refills: 1 | Status: SHIPPED | OUTPATIENT
Start: 2022-11-09 | End: 2022-11-19

## 2022-11-09 RX ORDER — MIDAZOLAM HYDROCHLORIDE 1 MG/ML
INJECTION INTRAMUSCULAR; INTRAVENOUS PRN
Status: DISCONTINUED | OUTPATIENT
Start: 2022-11-09 | End: 2022-11-09 | Stop reason: SDUPTHER

## 2022-11-09 RX ORDER — FENTANYL CITRATE 50 UG/ML
25 INJECTION, SOLUTION INTRAMUSCULAR; INTRAVENOUS EVERY 5 MIN PRN
Status: DISCONTINUED | OUTPATIENT
Start: 2022-11-09 | End: 2022-11-09 | Stop reason: HOSPADM

## 2022-11-09 RX ORDER — SODIUM CHLORIDE 9 MG/ML
INJECTION, SOLUTION INTRAVENOUS PRN
Status: DISCONTINUED | OUTPATIENT
Start: 2022-11-09 | End: 2022-11-09 | Stop reason: SDUPTHER

## 2022-11-09 RX ORDER — LIDOCAINE HYDROCHLORIDE 40 MG/ML
SOLUTION TOPICAL PRN
Status: DISCONTINUED | OUTPATIENT
Start: 2022-11-09 | End: 2022-11-09 | Stop reason: SDUPTHER

## 2022-11-09 RX ADMIN — SODIUM CHLORIDE: 9 INJECTION, SOLUTION INTRAVENOUS at 08:00

## 2022-11-09 RX ADMIN — MIDAZOLAM 2 MG: 1 INJECTION INTRAMUSCULAR; INTRAVENOUS at 08:27

## 2022-11-09 RX ADMIN — FENTANYL CITRATE 100 MCG: 50 INJECTION INTRAMUSCULAR; INTRAVENOUS at 08:29

## 2022-11-09 RX ADMIN — DEXMEDETOMIDINE HYDROCHLORIDE 4 MCG: 100 INJECTION, SOLUTION INTRAVENOUS at 08:50

## 2022-11-09 RX ADMIN — ONDANSETRON 4 MG: 2 INJECTION INTRAMUSCULAR; INTRAVENOUS at 08:50

## 2022-11-09 RX ADMIN — SUGAMMADEX 200 MG: 100 INJECTION, SOLUTION INTRAVENOUS at 09:17

## 2022-11-09 RX ADMIN — CEFAZOLIN 2000 MG: 2 INJECTION, POWDER, FOR SOLUTION INTRAMUSCULAR; INTRAVENOUS at 08:29

## 2022-11-09 RX ADMIN — ROCURONIUM BROMIDE 10 MG: 10 INJECTION INTRAVENOUS at 08:34

## 2022-11-09 RX ADMIN — LIDOCAINE HYDROCHLORIDE 4 ML: 40 SOLUTION TOPICAL at 08:36

## 2022-11-09 RX ADMIN — LIDOCAINE HYDROCHLORIDE 60 MG: 20 INJECTION, SOLUTION EPIDURAL; INFILTRATION; INTRACAUDAL; PERINEURAL at 08:34

## 2022-11-09 RX ADMIN — DEXMEDETOMIDINE HYDROCHLORIDE 4 MCG: 100 INJECTION, SOLUTION INTRAVENOUS at 08:53

## 2022-11-09 RX ADMIN — FENTANYL CITRATE 50 MCG: 50 INJECTION INTRAMUSCULAR; INTRAVENOUS at 09:55

## 2022-11-09 RX ADMIN — DEXAMETHASONE SODIUM PHOSPHATE 10 MG: 4 INJECTION, SOLUTION INTRAMUSCULAR; INTRAVENOUS at 08:40

## 2022-11-09 RX ADMIN — ROCURONIUM BROMIDE 25 MG: 10 INJECTION INTRAVENOUS at 08:35

## 2022-11-09 RX ADMIN — PROPOFOL 200 MG: 10 INJECTION, EMULSION INTRAVENOUS at 08:34

## 2022-11-09 RX ADMIN — SUGAMMADEX 200 MG: 100 INJECTION, SOLUTION INTRAVENOUS at 09:12

## 2022-11-09 ASSESSMENT — PAIN SCALES - GENERAL
PAINLEVEL_OUTOF10: 7
PAINLEVEL_OUTOF10: 0
PAINLEVEL_OUTOF10: 4
PAINLEVEL_OUTOF10: 7
PAINLEVEL_OUTOF10: 4
PAINLEVEL_OUTOF10: 0

## 2022-11-09 ASSESSMENT — PAIN DESCRIPTION - DESCRIPTORS
DESCRIPTORS: STABBING

## 2022-11-09 ASSESSMENT — PAIN DESCRIPTION - LOCATION
LOCATION: ABDOMEN

## 2022-11-09 ASSESSMENT — PAIN DESCRIPTION - FREQUENCY
FREQUENCY: CONTINUOUS
FREQUENCY: CONTINUOUS

## 2022-11-09 ASSESSMENT — PAIN - FUNCTIONAL ASSESSMENT
PAIN_FUNCTIONAL_ASSESSMENT: ACTIVITIES ARE NOT PREVENTED
PAIN_FUNCTIONAL_ASSESSMENT: ACTIVITIES ARE NOT PREVENTED

## 2022-11-09 ASSESSMENT — PAIN DESCRIPTION - ONSET
ONSET: ON-GOING
ONSET: ON-GOING

## 2022-11-09 ASSESSMENT — PAIN DESCRIPTION - PAIN TYPE
TYPE: SURGICAL PAIN
TYPE: SURGICAL PAIN

## 2022-11-09 ASSESSMENT — PAIN DESCRIPTION - ORIENTATION
ORIENTATION: RIGHT

## 2022-11-09 NOTE — PROGRESS NOTES
Pt to pacu in stable condition. States pain tolerable at level 4. Ice chips given for c/o sore throat  report to phase 2. Iv site , sanchez and surgical site unchanged.

## 2022-11-09 NOTE — PROGRESS NOTES
Patient tolerated snack well, VSS. Post void challenge was successful, patient voided 300mls urine. Tolerated snack well. Discharge instructions discussed with patient and family, patient ready for discharge. Waiting for pharmacy to bring meds to bedside.

## 2022-11-09 NOTE — H&P
Date of Surgery Update:  Daryl Matias was seen, history and physical examination reviewed, and patient examined by me today. There have been no significant clinical changes since the completion of the previous history and physical. The surgical site was confirmed by the patient and me. I have presented reasonable alternatives to the patient's proposed care, treatment, and services. The discussion I have done encompassed risks, benefits, and side effects related to the alternatives and the risks related to not receiving the proposed care, treatment, and services. All questions answered. Patient wishes to proceed.      Electronically signed by: Rasta Stahl MD,11/9/2022,8:36 AM

## 2022-11-09 NOTE — PROGRESS NOTES
Patient received from PACU awake, alert, VSS. Suprapubic puncture sites X2 with dermabond, no drainage. Call light in reach, snack provided.

## 2022-11-09 NOTE — DISCHARGE INSTRUCTIONS
[address] [place] [address]   [phone]    Dr. Justus Mckee MD  [facility]  [address]  Phone [phone]  Fax: [phone]      PATIENT NAME: Nancy RECORD NUMBER:  [id number]  TODAY'S DATE: 2022       Discharge Instructions Minor: Procedure(s):  RETROPUBIC SLIN (CPT®)  CYSTOSCOPY: 55672 (CPT®)      Post-operative instructions  WHAT TO EXPECT AFTER THE PROCEDURE:   Diet You may return to your normal diet after surgery. Mild nausea and possibly vomiting may occur in the first 6-8 hours following surgery. This is usually due to side effects of anesthesia and will resolve. We suggest clear liquids and a light meal the evening following surgery. Activity You will be limited to light activity for 2 weeks. You may not engage in sexual intercourse, use tampons, lift  greater than 10 lbs. , jump, squat, or ride straddle (bike, motorcycle, etc.) for 4-6 weeks. Voiding You may notice some mild burning with urination after surgery due to the catheter that is placed during surgery. This should resolve in 1-2 days. You also may notice a slower stream or mild difficulty voiding. This can be secondary to the swelling and usually improves within a week. If at any point you cannot void for 6 straight hours, contact our office. You may come home with a urinary catheter (should not be attached to a bag) you will come into the office 1 week later to attempt a voiding trial and see if its ready to be removed. Please contact our office if you have any of the following symptoms:  Discharge that has a foul odor or is green in color. Soaking a pad every 2 hours, continuing to bleed longer than 1 week or have a sudden increase in your bleeding. Develop a fever of 100.4 or higher. Hygiene You may resume normal showering immediately after surgery.  Avoid baths and swimming for 4 weeks after surgery. No lotions or creams on incisions, unless directed by provider    Medications In most cases, you will be sent home with a prescription pain medication. If the pain medication you are sent home with does not control the pain when being used as directed, call our office. Typically, discomfort lasts from a few days to two weeks, but it should progressively improve. While taking prescription pain medication, it is recommended you also take a stool softener such as Docusate Sodium (Colace, Dulcolax) to counteract the constipating side effects of the pain medication. If the pain is mild, you may take over-the-counter Tylenol (acetaminophen) or a Non-Steroidal Anti-Inflammatories (NSAIDs) such as Aspirin, Motrin or Advil (Ibuprofen), Aleve (Naproxen). Post-Op Visits You will be scheduled to see the Nurse Practitioner approximately 2 weeks after your surgery. FOLLOW UP APPOINTMENTS  Upon leaving the hospital, you should call [phone] during normal business hours to schedule follow-up appointments.  You will likely need to be seen for the following:  Catheter removal in one week, if you go home with a bladder catheter (OR you may be instructed to remove your catheter at home in one week)   Two-week follow-up   Six-week follow-up

## 2022-11-09 NOTE — PROGRESS NOTES
Pt in pacu. Sleeping , surgical site with small incisions well approximated with derma bond. Yepez in place and patent, iv site wnl.

## 2022-11-09 NOTE — ANESTHESIA PRE PROCEDURE
Department of Anesthesiology  Preprocedure Note       Name:  Sarthak Sanz   Age:  40 y.o.  :  1985                                          MRN:  [id number]         Date:  2022      Surgeon: Dione Winn):  Kathy Myers MD    Procedure: Procedure(s):  RETROPUBIC SLING  CYSTOSCOPY    Medications prior to admission:   Prior to Admission medications    Medication Sig Start Date End Date Taking? Authorizing Provider   rOPINIRole (REQUIP) 0.25 MG tablet Take 1 tablet by mouth nightly 10/13/22   LINA Dwyer - CNP       Current medications:    Current Facility-Administered Medications   Medication Dose Route Frequency Provider Last Rate Last Admin    0.9 % sodium chloride infusion   IntraVENous Continuous Yaquelin Davila MD        sodium chloride flush 0.9 % injection 5-40 mL  5-40 mL IntraVENous 2 times per day Yaquelin Davila MD        sodium chloride flush 0.9 % injection 5-40 mL  5-40 mL IntraVENous PRN Yaquelin Davila MD        0.9 % sodium chloride infusion   IntraVENous PRN Yaquelin Davila MD        ceFAZolin (ANCEF) 2,000 mg in dextrose 5 % 50 mL IVPB (mini-bag)  2,000 mg IntraVENous On Call to Kamilah Almaraz MD           Allergies:     Allergies   Allergen Reactions    Penicillins Hives       Problem List:    Patient Active Problem List   Diagnosis Code   (none) - all problems resolved or deleted       Past Medical History:        Diagnosis Date    Hepatitis C     asymptomatic    IV drug abuse ([facility] [address]     Ovarian cyst     Urinary incontinence 2022       Past Surgical History:        Procedure Laterality Date    LAPAROSCOPY  2/10/16    diagnostic laparoscopy, KTP laser of endometriosis     LAPAROSCOPY      for endometriosis    TUBAL LIGATION         Social History:    Social History     Tobacco Use    Smoking status: Former     Packs/day: 0.25     Types: Cigarettes     Quit date: 12/15/2018     Years since quitting: 3.9    Smokeless tobacco: Never    Tobacco comments:     Not smoking at all now   Substance Use Topics    Alcohol use: No                                Counseling given: Not Answered  Tobacco comments: Not smoking at all now      Vital Signs (Current):   Vitals:    11/01/22 1502   Weight: 172 lb (78 kg)   Height: 5' 3\" (1.6 m)                                              BP Readings from Last 3 Encounters:   10/31/22 110/80   10/12/22 96/60   10/03/22 108/74       NPO Status:                                                                                 BMI:   Wt Readings from Last 3 Encounters:   11/01/22 172 lb (78 kg)   10/31/22 172 lb 12.8 oz (78.4 kg)   08/31/22 162 lb 12.8 oz (73.8 kg)     Body mass index is 30.47 kg/m². CBC:   Lab Results   Component Value Date/Time    WBC 6.0 01/12/2022 10:14 AM    RBC 4.37 01/12/2022 10:14 AM    HGB 13.0 01/12/2022 10:14 AM    HCT 38.5 01/12/2022 10:14 AM    MCV 88.1 01/12/2022 10:14 AM    RDW 13.8 01/12/2022 10:14 AM     01/12/2022 10:14 AM       CMP:   Lab Results   Component Value Date/Time     01/12/2022 10:14 AM    K 5.2 01/12/2022 10:14 AM    K 4.1 07/29/2021 10:31 AM     01/12/2022 10:14 AM    CO2 28 01/12/2022 10:14 AM    BUN 12 01/12/2022 10:14 AM    CREATININE 1.1 01/12/2022 10:14 AM    GFRAA >60 01/12/2022 10:14 AM    GFRAA >60 01/18/2012 11:36 PM    AGRATIO 1.1 11/12/2021 01:20 PM    LABGLOM 56 01/12/2022 10:14 AM    GLUCOSE 104 01/12/2022 10:14 AM    PROT 8.0 11/12/2021 01:20 PM    CALCIUM 9.1 01/12/2022 10:14 AM    BILITOT 0.3 11/12/2021 01:20 PM    ALKPHOS 109 11/12/2021 01:20 PM     11/12/2021 01:20 PM     11/12/2021 01:20 PM       POC Tests: No results for input(s): POCGLU, POCNA, POCK, POCCL, POCBUN, POCHEMO, POCHCT in the last 72 hours.     Coags: No results found for: PROTIME, INR, APTT    HCG (If Applicable):   Lab Results   Component Value Date    PREGTESTUR Negative 11/09/2021        ABGs: No results found for: PHART, PO2ART, SFL2URK, YBF0LAM, BEART, S6PLLWHV     Type & Screen (If Applicable):  No results found for: LABABO, LABRH    Drug/Infectious Status (If Applicable):  No results found for: HIV, HEPCAB    COVID-19 Screening (If Applicable):   Lab Results   Component Value Date/Time    COVID19 DETECTED 01/05/2022 10:39 PM           Anesthesia Evaluation  Patient summary reviewed no history of anesthetic complications:   Airway: Mallampati: II  TM distance: >3 FB   Neck ROM: full  Mouth opening: > = 3 FB   Dental: normal exam         Pulmonary:Negative Pulmonary ROS breath sounds clear to auscultation                             Cardiovascular:Negative CV ROS  Exercise tolerance: good (>4 METS),           Rhythm: regular                      Neuro/Psych:   Negative Neuro/Psych ROS               ROS comment: Remote h/o IVDU GI/Hepatic/Renal:   (+) hepatitis: C,      (-) hiatal hernia, GERD, PUD, liver disease, no renal disease, bowel prep and no morbid obesity       Endo/Other: Negative Endo/Other ROS                    Abdominal:             Vascular: negative vascular ROS. Other Findings:           Anesthesia Plan      general     ASA 2       Induction: intravenous. MIPS: Postoperative opioids intended, Prophylactic antiemetics administered and Postoperative trial extubation. Anesthetic plan and risks discussed with patient. Plan discussed with CRNA. Nori Gambino MD   11/9/2022      This pre-anesthesia assessment may be used as a history and physical.    DOS STAFF ADDENDUM:    Pt seen and examined, chart reviewed (including anesthesia, drug and allergy history). No interval changes to history and physical examination. Anesthetic plan, risks, benefits, alternatives, and personnel involved discussed with patient. Patient verbalized an understanding and agrees to proceed.       Nori Gambino MD  November 9, 2022  7:17 AM

## 2022-11-09 NOTE — PROGRESS NOTES
CLINICAL PHARMACY NOTE: MEDS TO BEDS    Total # of Prescriptions Filled: 2   The following medications were delivered to the patient:  Current Discharge Medication List        START taking these medications    Details   ibuprofen (ADVIL;MOTRIN) 600 MG tablet Take 1 tablet by mouth three times daily for 10 days  Qty: 15 tablet, Refills: 1      oxyCODONE-acetaminophen (PERCOCET) 5-325 MG per tablet Take 1 tablet by mouth every 6 hours as needed for Pain for up to 5 days.   Qty: 5 tablet, Refills: 0    Comments: Reduce doses taken as pain becomes manageable  Associated Diagnoses: Postoperative pain               Additional Documentation:

## 2022-11-09 NOTE — OP NOTE
RETROPUBICSLING    DATE OF OPERATION: 11/09/22    PREOPERATIVE DIAGNOSIS:   Stress urinary incontinence    POSTOPERATIVE DIAGNOSIS:   Stress urinary incontinence    PROCEDURE:   Retropubic Suburethral sling (Lynx)   Urethrocele Repair   Cystourethroscopy     ESTIMATED BLOOD LOSS: 50 mL     UOP: NAmL     ANESTHESIA: General     COMPLICATIONS: None     FINDINGS: Normal bladder mucosa and urethra on cystoscopy. SURGEON: Ashley De La Paz MD    INDICATIONS: This is a 26-year-old who presented with stress incontinence. Preoperative urodynamic testing confirmed stress incontinence. The patient was counseled on the risks, benefits and alternatives for a sling. She was counseled on the risks including but not limited to bleeding, infection, damage to the bowel/bladder/Ureters/nerves/blood vessels, need for further surgery, need for exploratory laparotomy, risks of postoperative voiding/sexual dysfunction, risks of mesh erosion, the risks of recurrent incontinence and the anesthesia risks. The patient understood these risks and desired to proceed with the procedure. OPERATIVE NOTE: The patient was taken to the operating room and general anesthesia was found to be adequate. She was prepped and draped in the normal sterile fashion and placed in [address] [place] [address]. Preoperative antibiotics were administered in the patient holding area. A sanchez catheter was then placed in the bladder. RETROPUBIC SUBURETHRAL SLING: The bladder was completely drained using the Sanchez catheter. Using Allis clamps, the epithelium under the urethra was grasped and injected with 1% lidocaine with epinephrine. An incision was made with the scalpel, and the lateral edges grasped with Allis clamps. Two tunnels were developed bilaterally until the pubic rami were palpated. Areas on the skin were marked 2 cm lateral to the midline above the symphysis pubis.   These areas were injected with 1% lidocaine with epinephrine and incisions were made with a scalpel over the marks. The trocars were then placed bilaterally and brought through the vaginal tunnel with one finger in the vaginal tunnel to guide the trocars. CYSTOURETHROSCOPY: A cystourethroscopy was performed with the trocars in place to confirm no injury to the bladder or urethra. The mesh was attached to the trocars and brought through the tunnels. The suburethral mesh was placed tension-free in the midurethral segment. The mesh was tensioned using a right angle clamp and Crede maneuver. The plastic sheath was removed. The sling was cut off at the skin. The skin was closed with dermabond. A urethrocele repair was performed by excising the excess vaginal epithelium and reapproximating this tissue with the vaginal incision closure. The vaginal incision was closed with 3-0 Vicryl in a running, locked fashion. The patient tolerated the procedure well. Sponge, lap and needle counts were correct x2. The patient was then taken to recovery room in stable condition.        Marielos Cherry MD

## 2022-11-09 NOTE — PROGRESS NOTES
Procedure explained to patient, bladder retro filled with 300mls sterile water, balloon deflated, sanchez taken out complete. Patient assisted to bathroom and voided 300 mls urine. Deya-care done, patient left comfortable.

## 2022-11-09 NOTE — PROGRESS NOTES
Vaginal Sweep Documentation     Vaginal prep sponge count performed by Meron Garcia, BRO and Dionte CASTILLO RN Count correct. Vaginal sweep performed by DR. Ayesha Cannon  at 1959. No foreign objects or vaginal tears noted.       Electronically signed by Hilton Samaniego RN on 11/9/2022 at 9:11 AM

## 2022-11-09 NOTE — BRIEF OP NOTE
Brief Postoperative Note      Patient: Margi Tavarez  YOB: 1985  MRN: [id number]    Date of Procedure: 11/9/2022    Pre-Op Diagnosis: GENUINE STRESS INCONTINENCE    Post-Op Diagnosis: Same       Procedure(s):  RETROPUBIC SLING  CYSTOSCOPY    Surgeon(s):  Grace Venegas MD    Assistant:  Surgical Assistant: Cy Hughes    Anesthesia: General    Estimated Blood Loss (mL): less than 50     Complications: None    Specimens:   * No specimens in log *    Implants:  Implant Name Type Inv.  Item Serial No.  Lot No. LRB No. Used Action   SYSTEM SLNG DYLAN SUPRPUB MID [facility] [id number]  SYSTEM SLNG DYLAN SUPRPUB MID [address] [employer] [id number] N/A 1 Implanted         Drains: * No LDAs found *    Findings: stress incontinence, urethrocele      Electronically signed by Jing Melton MD on 11/9/2022 at 9:11 AM

## 2022-11-14 ENCOUNTER — OFFICE VISIT (OUTPATIENT)
Dept: UROGYNECOLOGY | Age: 37
End: 2022-11-14

## 2022-11-14 VITALS
RESPIRATION RATE: 16 BRPM | TEMPERATURE: 98.9 F | DIASTOLIC BLOOD PRESSURE: 87 MMHG | SYSTOLIC BLOOD PRESSURE: 125 MMHG | HEART RATE: 112 BPM | OXYGEN SATURATION: 97 %

## 2022-11-14 DIAGNOSIS — N89.8 VAGINAL DISCHARGE: ICD-10-CM

## 2022-11-14 DIAGNOSIS — Z09 POSTOP CHECK: Primary | ICD-10-CM

## 2022-11-14 DIAGNOSIS — T81.40XA POSTOPERATIVE INFECTION, UNSPECIFIED TYPE, INITIAL ENCOUNTER: ICD-10-CM

## 2022-11-14 PROCEDURE — 99024 POSTOP FOLLOW-UP VISIT: CPT | Performed by: NURSE PRACTITIONER

## 2022-11-14 RX ORDER — IBUPROFEN 600 MG/1
600 TABLET ORAL EVERY 6 HOURS PRN
Qty: 56 TABLET | Refills: 0 | Status: SHIPPED | OUTPATIENT
Start: 2022-11-14

## 2022-11-14 RX ORDER — METRONIDAZOLE 500 MG/1
500 TABLET ORAL 2 TIMES DAILY
Qty: 14 TABLET | Refills: 0 | Status: SHIPPED | OUTPATIENT
Start: 2022-11-14 | End: 2022-11-21

## 2022-11-14 NOTE — PROGRESS NOTES
11/14/2022       HPI:     Name: Abdirahman Junior  YOB: 1985    CC: Abdirahman Junior is a 40 y.o. female who is here for a 5 day post op evaluation. HPI: Recently underwent RETROPUBIC SLING and cysto on 11/9/22. Patient reports foul smelling discharge coming from her vaginal area and reports severe pain. Voiding difficulty: No  Initiating stream: No  Force stream: Yes, patient has to push  Lean forward to empty: Yes  Slow/intermittent stream: No  Unable to empty bladder: No  Incontinence: no   Urgency without incontinence: No   Frequency without incontinence: No   Bowel functions: normal, patient reports blood in the rectum  Pain Controlled: per patient she is in severe pain. Past Medical History:   Past Medical History:   Diagnosis Date    Hepatitis C     asymptomatic    IV drug abuse ([facility] [address]     Ovarian cyst     Urinary incontinence 11/01/2022     Past Surgical History:   Past Surgical History:   Procedure Laterality Date    CYSTOSCOPY N/A 11/9/2022    [facility]  [facility]    diagnostic laparoscopy, KTP laser of endometriosis     LAPAROSCOPY  2017    for endometriosis    TUBAL LIGATION      URETHRAL SURGERY N/A 11/9/2022    RETROPUBIC SLING performed by Polo Power MD at [facility]     Current Medications:  Current Outpatient Medications   Medication Sig Dispense Refill    ibuprofen (ADVIL;MOTRIN) 600 MG tablet Take 1 tablet by mouth every 6 hours as needed for Pain 56 tablet 0    metroNIDAZOLE (FLAGYL) 500 MG tablet Take 1 tablet by mouth 2 times daily for 7 days 14 tablet 0    ibuprofen (ADVIL;MOTRIN) 600 MG tablet Take 1 tablet by mouth three times daily for 10 days 15 tablet 1    rOPINIRole (REQUIP) 0.25 MG tablet Take 1 tablet by mouth nightly 60 tablet 0    oxyCODONE-acetaminophen (PERCOCET) 5-325 MG per tablet Take 1 tablet by mouth every 6 hours as needed for Pain for up to 5 days.  (Patient not taking: Reported on 11/14/2022) 5 tablet 0     No current facility-administered medications for this visit. Allergies: Allergies   Allergen Reactions    Penicillins Hives     Social History:   Social History     Socioeconomic History    Marital status: Legally      Spouse name: Not on file    Number of children: Not on file    Years of education: Not on file    Highest education level: Not on file   Occupational History    Not on file   Tobacco Use    Smoking status: Former     Packs/day: 0.25     Types: Cigarettes     Quit date: 12/15/2018     Years since quitting: 3.9    Smokeless tobacco: Never    Tobacco comments:     Not smoking at all now   Vaping Use    Vaping Use: Former    Quit date: 1/1/2021    Substances: Nicotine (twice a day)    Devices: Disposable   Substance and Sexual Activity    Alcohol use: No    Drug use: Not Currently     Types: IV     Comment: Been clean since 11/12/2013    Sexual activity: Not Currently   Other Topics Concern    Not on file   Social History Narrative    Not on file     Social Determinants of Health     Financial Resource Strain: Not on file   Food Insecurity: Not on file   Transportation Needs: Not on file   Physical Activity: Not on file   Stress: Not on file   Social Connections: Not on file   Intimate Partner Violence: Not on file   Housing Stability: Not on file     Family History:   Family History   Problem Relation Age of Onset    Substance Abuse Mother     Mental Illness Mother     Heart Disease Maternal Grandmother     Emphysema Maternal Grandmother     Diabetes Maternal Grandmother     Heart Disease Paternal Grandmother     Diabetes Paternal Grandfather          Objective:     Vitals  Vitals:    11/14/22 1513   BP: 125/87   Pulse: (!) 112   Resp: 16   Temp: 98.9 °F (37.2 °C)   SpO2: 97%     Physical Exam  Physical Exam  Vitals and nursing note reviewed. Constitutional:       Appearance: Normal appearance. HENT:      Head: Normocephalic.    Eyes:      Conjunctiva/sclera: Conjunctivae normal.   Cardiovascular:      Rate and Rhythm: Normal rate. Pulmonary:      Effort: Pulmonary effort is normal.   Musculoskeletal:         General: Normal range of motion. Cervical back: Normal range of motion. Skin:     General: Skin is warm and dry. Neurological:      General: No focal deficit present. Mental Status: She is alert. Psychiatric:         Mood and Affect: Mood normal.         Behavior: Behavior normal.     All surgical incisions are visible and intact. She does have a moderate amount of discharge. There is a small hematoma at her suburethral incision/suture which is causing her pain. No results found for this visit on 11/14/22. Assessnent/Plan:     Dexter Toth is a 40 y.o. female with   1. Postop check    2. Vaginal discharge    3. Postoperative infection, unspecified type, initial encounter        Patient is POD 5 from suburethral sling. Small hematoma present as well as moderate amount vaginal discharge  She will begin sitz bath 5x daily, ice is also ok. Ibuprofen 600 mg q 6 hours, refill provided  Metronidazole BID for 7 days, advised of no ETOH  Keep already scheduled appointment next week. Daniele Nogueira NP    No orders of the defined types were placed in this encounter.     Orders Placed This Encounter   Medications    ibuprofen (ADVIL;MOTRIN) 600 MG tablet     Sig: Take 1 tablet by mouth every 6 hours as needed for Pain     Dispense:  56 tablet     Refill:  0    metroNIDAZOLE (FLAGYL) 500 MG tablet     Sig: Take 1 tablet by mouth 2 times daily for 7 days     Dispense:  14 tablet     Refill:  0         LINA Bennett - CNP

## 2022-11-22 ENCOUNTER — OFFICE VISIT (OUTPATIENT)
Dept: UROGYNECOLOGY | Age: 37
End: 2022-11-22

## 2022-11-22 VITALS
RESPIRATION RATE: 14 BRPM | DIASTOLIC BLOOD PRESSURE: 67 MMHG | TEMPERATURE: 46.4 F | HEART RATE: 78 BPM | SYSTOLIC BLOOD PRESSURE: 108 MMHG | OXYGEN SATURATION: 99 %

## 2022-11-22 DIAGNOSIS — Z09 POSTOP CHECK: Primary | ICD-10-CM

## 2022-11-22 PROCEDURE — 99024 POSTOP FOLLOW-UP VISIT: CPT | Performed by: NURSE PRACTITIONER

## 2022-11-22 NOTE — LETTER
[address] [place] [address] Urogynecology  [address] [facility]  Phone: [phone]  Fax: [phone]    LINA Ambrose CNP        November 22, 2022     Patient: Adan Lainez   YOB: 1985   Date of Visit: 11/22/2022       To Whom It May Concern: It is my medical opinion that Yoni Fiddler should remain out of work until 11/28/22. If you have any questions or concerns, please don't hesitate to call.     Sincerely,        LINA Ambrose CNP

## 2022-11-22 NOTE — PROGRESS NOTES
11/22/2022       HPI:     Name: Gumaro Beauchamp  YOB: 1985    CC: Gumaro Beauchamp is a 40 y.o. female who is here for a 2 week post op evaluation. HPI: Recently underwent Retropubic Suburethral sling Sheeba Or)   Urethrocele Repair   Cystourethroscopy     Patient has completed her Metronidazole and had improvement in her vaginal discharge  Having some pain on left suprapubic incision. Tender to touch. Offered Lidocaine injection but declined    Voiding difficulty: No  Initiating stream: No  Force stream: No  Lean forward to empty: No  Slow/intermittent stream: No  Unable to empty bladder: No  Incontinence: no   Urgency without incontinence: No   Frequency without incontinence: No   Bowel functions: normal   Pain Controlled: Yes    Past Medical History:   Past Medical History:   Diagnosis Date    Hepatitis C     asymptomatic    IV drug abuse ([facility] [address]     Ovarian cyst     Urinary incontinence 11/01/2022     Past Surgical History:   Past Surgical History:   Procedure Laterality Date    CYSTOSCOPY N/A 11/9/2022    CYSTOSCOPY performed by Nevaeh Campbell MD at [facility]  2/10/16    diagnostic laparoscopy, KTP laser of endometriosis     LAPAROSCOPY  2017    for endometriosis    TUBAL LIGATION      URETHRAL SURGERY N/A 11/9/2022    RETROPUBIC SLING performed by Nevaeh Campbell MD at [facility]     Current Medications:  Current Outpatient Medications   Medication Sig Dispense Refill    ibuprofen (ADVIL;MOTRIN) 600 MG tablet Take 1 tablet by mouth every 6 hours as needed for Pain 56 tablet 0    rOPINIRole (REQUIP) 0.25 MG tablet Take 1 tablet by mouth nightly 60 tablet 0    ibuprofen (ADVIL;MOTRIN) 600 MG tablet Take 1 tablet by mouth three times daily for 10 days 15 tablet 1     No current facility-administered medications for this visit. Allergies:    Allergies   Allergen Reactions    Penicillins Hives     Social History:   Social History     Socioeconomic History    Marital status: Legally      Spouse name: Not on file    Number of children: Not on file    Years of education: Not on file    Highest education level: Not on file   Occupational History    Not on file   Tobacco Use    Smoking status: Former     Packs/day: 0.25     Types: Cigarettes     Quit date: 12/15/2018     Years since quitting: 3.9    Smokeless tobacco: Never    Tobacco comments:     Not smoking at all now   Vaping Use    Vaping Use: Former    Quit date: 1/1/2021    Substances: Nicotine (twice a day)    Devices: Disposable   Substance and Sexual Activity    Alcohol use: No    Drug use: Not Currently     Types: IV     Comment: Been clean since 11/12/2013    Sexual activity: Not Currently   Other Topics Concern    Not on file   Social History Narrative    Not on file     Social Determinants of Health     Financial Resource Strain: Not on file   Food Insecurity: Not on file   Transportation Needs: Not on file   Physical Activity: Not on file   Stress: Not on file   Social Connections: Not on file   Intimate Partner Violence: Not on file   Housing Stability: Not on file     Family History:   Family History   Problem Relation Age of Onset    Substance Abuse Mother     Mental Illness Mother     Heart Disease Maternal Grandmother     Emphysema Maternal Grandmother     Diabetes Maternal Grandmother     Heart Disease Paternal Grandmother     Diabetes Paternal Grandfather          Objective:     Vitals  Vitals:    11/22/22 1019   BP: 108/67   Pulse: 78   Resp: 14   Temp: (!) 46.4 °F (8 °C)   SpO2: 99%     Physical Exam  Physical Exam  Vitals and nursing note reviewed. Constitutional:       Appearance: Normal appearance. HENT:      Head: Normocephalic. Eyes:      Conjunctiva/sclera: Conjunctivae normal.   Cardiovascular:      Rate and Rhythm: Normal rate. Pulmonary:      Effort: Pulmonary effort is normal.   Musculoskeletal:         General: Normal range of motion. Cervical back: Normal range of motion.    Skin:

## 2022-11-30 ENCOUNTER — PATIENT MESSAGE (OUTPATIENT)
Dept: FAMILY MEDICINE CLINIC | Age: 37
End: 2022-11-30

## 2022-11-30 DIAGNOSIS — G25.81 RLS (RESTLESS LEGS SYNDROME): ICD-10-CM

## 2022-11-30 RX ORDER — ROPINIROLE 0.25 MG/1
TABLET, FILM COATED ORAL
Qty: 60 TABLET | Refills: 0 | Status: SHIPPED | OUTPATIENT
Start: 2022-11-30 | End: 2022-12-02 | Stop reason: DRUGHIGH

## 2022-11-30 NOTE — TELEPHONE ENCOUNTER
Medication:   Requested Prescriptions     Pending Prescriptions Disp Refills    rOPINIRole (REQUIP) 0.25 MG tablet [Pharmacy Med Name: ROPINIROLE 0.25MG TABLETS] 60 tablet 0     Sig: TAKE 1 TABLET BY MOUTH EVERY NIGHT      Last Filled:      Patient Phone Number: [phone] (home)     Last appt: 10/31/2022   Next appt: Visit date not found    Last OARRS: No flowsheet data found.   PDMP Monitoring:    Last PDMP Lindsay Bunny as Reviewed [facility]):  Review User Review Instant Review Result          Preferred Pharmacy:     [address], [address]  [address] [place] [address]  Phone: [phone] Fax: [phone]

## 2022-12-02 ENCOUNTER — TELEPHONE (OUTPATIENT)
Dept: FAMILY MEDICINE CLINIC | Age: 37
End: 2022-12-02

## 2022-12-02 DIAGNOSIS — G25.81 RLS (RESTLESS LEGS SYNDROME): Primary | ICD-10-CM

## 2022-12-02 RX ORDER — ROPINIROLE 0.5 MG/1
0.5 TABLET, FILM COATED ORAL NIGHTLY
Qty: 90 TABLET | Refills: 0 | Status: SHIPPED | OUTPATIENT
Start: 2022-12-02

## 2022-12-02 NOTE — TELEPHONE ENCOUNTER
PT's medication was supposed to bump it to .50 and it was denied due to her insurance, the only place that will allow her prescription to go through is [facility].     We need . 50 not . 25 of:     rOPINIRole (REQUIP) 0.25 MG tablet [[id number]]     Order Details  Dose, Route, Frequency: As Directed   Dispense Quantity: 60 tablet Refills: 0          Sig: TAKE 1 TABLET BY MOUTH EVERY NIGHT     [facility] [address] [facility] [place] [address]    [phone]

## 2022-12-02 NOTE — TELEPHONE ENCOUNTER
Is This A New Presentation, Or A Follow-Up?: Skin Lesion Please advise How Severe Is Your Skin Lesion?: mild Has Your Skin Lesion Been Treated?: not been treated

## 2022-12-06 ENCOUNTER — OFFICE VISIT (OUTPATIENT)
Dept: FAMILY MEDICINE CLINIC | Age: 37
End: 2022-12-06
Payer: COMMERCIAL

## 2022-12-06 VITALS
TEMPERATURE: 97.4 F | OXYGEN SATURATION: 99 % | DIASTOLIC BLOOD PRESSURE: 82 MMHG | SYSTOLIC BLOOD PRESSURE: 110 MMHG | HEART RATE: 108 BPM

## 2022-12-06 DIAGNOSIS — L23.9 ALLERGIC DERMATITIS: Primary | ICD-10-CM

## 2022-12-06 PROCEDURE — 96372 THER/PROPH/DIAG INJ SC/IM: CPT | Performed by: FAMILY MEDICINE

## 2022-12-06 PROCEDURE — 99213 OFFICE O/P EST LOW 20 MIN: CPT | Performed by: FAMILY MEDICINE

## 2022-12-06 RX ORDER — TRIAMCINOLONE ACETONIDE 40 MG/ML
40 INJECTION, SUSPENSION INTRA-ARTICULAR; INTRAMUSCULAR ONCE
Status: COMPLETED | OUTPATIENT
Start: 2022-12-06 | End: 2022-12-06

## 2022-12-06 RX ORDER — PREDNISONE 20 MG/1
TABLET ORAL
Qty: 24 TABLET | Refills: 0 | Status: SHIPPED | OUTPATIENT
Start: 2022-12-06

## 2022-12-06 RX ADMIN — TRIAMCINOLONE ACETONIDE 40 MG: 40 INJECTION, SUSPENSION INTRA-ARTICULAR; INTRAMUSCULAR at 15:54

## 2022-12-06 NOTE — PROGRESS NOTES
Subjective:      Patient ID: Sarthak Sanz is a 40 y.o. female. CC: Patient presents for acute medical problem-persistent rash. Medical assistant notes reviewed. HPI Patient presents with rash all over her body. She states her skin feels like \"its on fire\". Her skin is itchign like crazy all the time. She states she has treated with steroid cream, Prednisone, Calamine lotion, Epsom salts, Oatmeal baths and nothing seems to help. She had Flagyl script after surgery for an infection but last dose was November 20 th. Patient states that the rash started December 1. She is on Requip but has been on this medication for over a year. Same manufacture. She has been using topical Benadryl and baths with no real improvement. Review of Systems      Allergies   Allergen Reactions    Penicillins Hives        Objective:   Physical Exam  Vitals and nursing note reviewed. Constitutional:       General: She is not in acute distress. Appearance: She is well-developed. Skin:     General: Skin is warm. Findings: Rash present. Comments: Rash is predominantly along the neck and nape of her scalp. She also has a fine rash noted on both her arms, breast areas, and lower abdomen. Neurological:      Mental Status: She is alert. Psychiatric:         Behavior: Behavior is cooperative. Assessment:      Claribel was seen today for rash. Diagnoses and all orders for this visit:    Allergic dermatitis    Other orders  -     predniSONE (DELTASONE) 20 MG tablet; 1 TID for 4 day then 1 BID for 4 days then 1 qd  -     triamcinolone acetonide (KENALOG-40) injection 40 mg          Plan:      Advised patient to discontinue using anything topically to the skin. Recommend ice or cool compresses to the pruritic areas. Begin her on a prednisone taper and IM cortisol.     She may continue using Benadryl on a as needed basis orally    RTC as needed    Please note that this chart was generated using Dragon

## 2022-12-06 NOTE — PROGRESS NOTES
Medication given during visit:    Administrations This Visit       triamcinolone acetonide (KENALOG-40) injection 40 mg       Admin Date  12/06/2022  15:54 Action  Given Dose  40 mg Route  IntraMUSCular Site  Dorsogluteal Right Administered By  Isabel Hastings MA    Ordering Provider: Lindsey Thomas MD    NDC: 1970-0332-74    Lot#: [id number]    : [facility] ([facility])    Patient Supplied?: No                    Patient instructed to remain in clinic for 20 minutes after injection and was advised to report any adverse reaction to me immediately.

## 2022-12-16 ENCOUNTER — TELEPHONE (OUTPATIENT)
Dept: FAMILY MEDICINE CLINIC | Age: 37
End: 2022-12-16

## 2022-12-16 RX ORDER — PREDNISONE 20 MG/1
TABLET ORAL
Qty: 24 TABLET | Refills: 0 | Status: SHIPPED | OUTPATIENT
Start: 2022-12-16

## 2022-12-16 RX ORDER — FAMOTIDINE 40 MG/1
40 TABLET, FILM COATED ORAL DAILY
Qty: 30 TABLET | Refills: 3 | Status: SHIPPED | OUTPATIENT
Start: 2022-12-16

## 2022-12-16 NOTE — TELEPHONE ENCOUNTER
I represcribed the prednisone medication but also started her on Pepcid to see we can block some of the allergic reaction.   She also can use over-the-counter Zyrtec 10 mg up to 4 times a day

## 2022-12-16 NOTE — TELEPHONE ENCOUNTER
Prednisone helped with rash ans the Prednisone paper is not helping- she is itching  immensely and its burning and needs something else, the rash is on her hairline, shoulders ect.      [address] [address]   Phone:  [phone]  Fax:  [phone]

## 2022-12-22 ENCOUNTER — OFFICE VISIT (OUTPATIENT)
Dept: UROGYNECOLOGY | Age: 37
End: 2022-12-22

## 2022-12-22 ENCOUNTER — OFFICE VISIT (OUTPATIENT)
Dept: FAMILY MEDICINE CLINIC | Age: 37
End: 2022-12-22
Payer: COMMERCIAL

## 2022-12-22 VITALS
SYSTOLIC BLOOD PRESSURE: 119 MMHG | TEMPERATURE: 98 F | DIASTOLIC BLOOD PRESSURE: 82 MMHG | OXYGEN SATURATION: 98 % | HEART RATE: 78 BPM | RESPIRATION RATE: 14 BRPM

## 2022-12-22 VITALS
WEIGHT: 178.2 LBS | SYSTOLIC BLOOD PRESSURE: 110 MMHG | DIASTOLIC BLOOD PRESSURE: 78 MMHG | OXYGEN SATURATION: 98 % | BODY MASS INDEX: 31.57 KG/M2 | RESPIRATION RATE: 12 BRPM | HEART RATE: 92 BPM | TEMPERATURE: 98.2 F

## 2022-12-22 DIAGNOSIS — L23.9 ALLERGIC DERMATITIS: Primary | ICD-10-CM

## 2022-12-22 DIAGNOSIS — R35.0 URINARY FREQUENCY: ICD-10-CM

## 2022-12-22 DIAGNOSIS — N39.41 URGE INCONTINENCE: ICD-10-CM

## 2022-12-22 DIAGNOSIS — Z09 POSTOP CHECK: Primary | ICD-10-CM

## 2022-12-22 PROCEDURE — 99024 POSTOP FOLLOW-UP VISIT: CPT | Performed by: NURSE PRACTITIONER

## 2022-12-22 PROCEDURE — 99213 OFFICE O/P EST LOW 20 MIN: CPT | Performed by: NURSE PRACTITIONER

## 2022-12-22 RX ORDER — TRIAMCINOLONE ACETONIDE 0.25 MG/G
CREAM TOPICAL
Qty: 70 G | Refills: 0 | Status: SHIPPED | OUTPATIENT
Start: 2022-12-22

## 2022-12-22 RX ORDER — HYDROXYZINE PAMOATE 25 MG/1
25 CAPSULE ORAL 3 TIMES DAILY PRN
Qty: 90 CAPSULE | Refills: 0 | Status: SHIPPED | OUTPATIENT
Start: 2022-12-22 | End: 2023-01-21

## 2022-12-22 RX ORDER — CETIRIZINE HYDROCHLORIDE 10 MG/1
10 TABLET ORAL DAILY
Qty: 30 TABLET | Refills: 0 | Status: SHIPPED | OUTPATIENT
Start: 2022-12-22 | End: 2023-01-21

## 2022-12-22 RX ORDER — OXYBUTYNIN CHLORIDE 5 MG/1
5 TABLET, EXTENDED RELEASE ORAL DAILY
Qty: 30 TABLET | Refills: 1 | Status: SHIPPED | OUTPATIENT
Start: 2022-12-22

## 2022-12-22 NOTE — PROGRESS NOTES
12/22/2022       HPI:     Name: Shannen Gonsalez  YOB: 1985    CC: Shannen Gonsalez is a 40 y.o. female who is here for a 6 week post op evaluation. HPI: Recently underwent RETROPUBIC SLING  CYSTOSCOPY. Continues to have hives of unknown cause. Began end of November    Voiding difficulty: No  Initiating stream: No  Force stream: No  Lean forward to empty: No  Slow/intermittent stream: No  Unable to empty bladder: No  Incontinence: urge present prior to surgery  Urgency without incontinence: Yes   Frequency without incontinence: No   Bowel functions: normal   Pain Controlled: Yes    Past Medical History:   Past Medical History:   Diagnosis Date    Hepatitis C     asymptomatic    IV drug abuse ([facility] [facility])     Ovarian cyst     Urinary incontinence 11/01/2022     Past Surgical History:   Past Surgical History:   Procedure Laterality Date    CYSTOSCOPY N/A 11/9/2022    CYSTOSCOPY performed by Sofie Mir MD at [facility]  2/10/16    diagnostic laparoscopy, KTP laser of endometriosis     LAPAROSCOPY  2017    for endometriosis    TUBAL LIGATION      URETHRAL SURGERY N/A 11/9/2022    RETROPUBIC SLING performed by Sofie Mir MD at [place] [id number]     Current Medications:  Current Outpatient Medications   Medication Sig Dispense Refill    oxybutynin (DITROPAN XL) 5 MG extended release tablet Take 1 tablet by mouth daily 30 tablet 1    predniSONE (DELTASONE) 20 MG tablet 1 TID for 4 day then 1 BID for 4 days then 1 qd 24 tablet 0    famotidine (PEPCID) 40 MG tablet Take 1 tablet by mouth daily 30 tablet 3    rOPINIRole (REQUIP) 0.5 MG tablet Take 1 tablet by mouth at bedtime 90 tablet 0    ibuprofen (ADVIL;MOTRIN) 600 MG tablet Take 1 tablet by mouth every 6 hours as needed for Pain 56 tablet 0    ibuprofen (ADVIL;MOTRIN) 600 MG tablet Take 1 tablet by mouth three times daily for 10 days 15 tablet 1     No current facility-administered medications for this visit. Allergies:    Allergies Allergen Reactions    Penicillins Hives     Social History:   Social History     Socioeconomic History    Marital status: Legally      Spouse name: Not on file    Number of children: Not on file    Years of education: Not on file    Highest education level: Not on file   Occupational History    Not on file   Tobacco Use    Smoking status: Former     Packs/day: 0.25     Types: Cigarettes     Quit date: 12/15/2018     Years since quittin.0    Smokeless tobacco: Never    Tobacco comments:     Not smoking at all now   Vaping Use    Vaping Use: Former    Quit date: 2021    Substances: Nicotine (twice a day)    Devices: Disposable   Substance and Sexual Activity    Alcohol use: No    Drug use: Not Currently     Types: IV     Comment: Been clean since 2013    Sexual activity: Not Currently   Other Topics Concern    Not on file   Social History Narrative    Not on file     Social Determinants of Health     Financial Resource Strain: Not on file   Food Insecurity: Not on file   Transportation Needs: Not on file   Physical Activity: Not on file   Stress: Not on file   Social Connections: Not on file   Intimate Partner Violence: Not on file   Housing Stability: Not on file     Family History:   Family History   Problem Relation Age of Onset    Substance Abuse Mother     Mental Illness Mother     Heart Disease Maternal Grandmother     Emphysema Maternal Grandmother     Diabetes Maternal Grandmother     Heart Disease Paternal Grandmother     Diabetes Paternal Grandfather          Objective:     Vitals  Vitals:    22 1008   BP: 119/82   Pulse: 78   Resp: 14   Temp: 98 °F (36.7 °C)   SpO2: 98%     Physical Exam  Physical Exam  Vitals and nursing note reviewed. Constitutional:       Appearance: Normal appearance. HENT:      Head: Normocephalic. Eyes:      Conjunctiva/sclera: Conjunctivae normal.   Cardiovascular:      Rate and Rhythm: Normal rate.    Pulmonary:      Effort: Pulmonary effort is normal.   Musculoskeletal:         General: Normal range of motion. Cervical back: Normal range of motion. Skin:     General: Skin is warm and dry. Neurological:      General: No focal deficit present. Mental Status: She is alert. Psychiatric:         Mood and Affect: Mood normal.         Behavior: Behavior normal.     All surgical incisions healing well. No erythema. No evidence of hematoma or abscess. No results found for this visit on 12/22/22. Assessnent/Plan:     Sabina Boudreaux is a 40 y.o. female with   1. Postop check    2. Urge incontinence    3. Urinary frequency        Patient is doing well 6 weeks. She continues to have Urinary urgency as previous to sling procedure. She will begin Oxybutynin 5 mg XL for this and follow up in 6 weeks  Reviewe r/b, se and efficacy expectations. She is pleased with sling outcomes  Patient is to follow up in 6 weeks. No orders of the defined types were placed in this encounter.     Orders Placed This Encounter   Medications    oxybutynin (DITROPAN XL) 5 MG extended release tablet     Sig: Take 1 tablet by mouth daily     Dispense:  30 tablet     Refill:  [id number], APRN - [facility]

## 2022-12-22 NOTE — PROGRESS NOTES
Dexter Toth  : 1985  Encounter date: 2022    This is a 40 y.o. female who presents with  Chief Complaint   Patient presents with    Rash     Started the last week of November, all over shoulder, neck, and around hairline. States she has been seen for it before but it is not going away     History of present illness:    HPI   Presents to clinic today with concerns for rash on shoulders/neck/hairline that started approximately one month prior has been treated with steroids on two separate occasions along with a steroid injection given by her surgeon. She did recently have a urethral sling and improvement with her urinary incontinence will start on oxybutynin today. Reports the rash now has progressively been worsening - neck, into her hairline and then left side rash. Per patient \"feels like I have fleas\". Has tried multiple remedies - changed all laundry detergent to \"Free and Clear\". Has not been taking any medications to help with itching. Has also been taking the Pepcid. When taking the prednisone reports no relief. Her surgeon did mention she may be allergic to the material used for sling. Nothing else has changed besides surgery.      Allergies   Allergen Reactions    Penicillins Hives     Current Outpatient Medications   Medication Sig Dispense Refill    cetirizine (ZYRTEC) 10 MG tablet Take 1 tablet by mouth daily 30 tablet 0    hydrOXYzine pamoate (VISTARIL) 25 MG capsule Take 1 capsule by mouth 3 times daily as needed for Itching 90 capsule 0    triamcinolone (KENALOG) 0.025 % cream Apply Topically twice daily 70 g 0    oxybutynin (DITROPAN XL) 5 MG extended release tablet Take 1 tablet by mouth daily 30 tablet 1    predniSONE (DELTASONE) 20 MG tablet 1 TID for 4 day then 1 BID for 4 days then 1 qd 24 tablet 0    famotidine (PEPCID) 40 MG tablet Take 1 tablet by mouth daily 30 tablet 3    rOPINIRole (REQUIP) 0.5 MG tablet Take 1 tablet by mouth at bedtime 90 tablet 0 ibuprofen (ADVIL;MOTRIN) 600 MG tablet Take 1 tablet by mouth every 6 hours as needed for Pain 56 tablet 0    ibuprofen (ADVIL;MOTRIN) 600 MG tablet Take 1 tablet by mouth three times daily for 10 days 15 tablet 1     No current facility-administered medications for this visit. Review of Systems   Constitutional:  Negative for activity change, appetite change, chills, fatigue and fever. Respiratory:  Negative for cough and shortness of breath. Cardiovascular:  Negative for chest pain and palpitations. Gastrointestinal:  Negative for diarrhea, nausea and vomiting. Past medical, surgical, family and social history were reviewed and updated with the patient. Objective:    /78 (Site: Left Upper Arm, Position: Sitting, Cuff Size: Large Adult)   Pulse 92   Temp 98.2 °F (36.8 °C) (Infrared)   Resp 12   Wt 178 lb 3.2 oz (80.8 kg)   SpO2 98%   BMI 31.57 kg/m²   Weight: 178 lb 3.2 oz (80.8 kg)     BP Readings from Last 3 Encounters:   12/22/22 110/78   12/22/22 119/82   12/06/22 110/82     Wt Readings from Last 3 Encounters:   12/22/22 178 lb 3.2 oz (80.8 kg)   11/09/22 169 lb 5 oz (76.8 kg)   10/31/22 172 lb 12.8 oz (78.4 kg)       Physical Exam  Constitutional:       General: She is not in acute distress. Appearance: She is well-developed. HENT:      Head: Normocephalic and atraumatic. Cardiovascular:      Rate and Rhythm: Normal rate and regular rhythm. Heart sounds: Normal heart sounds, S1 normal and S2 normal.   Pulmonary:      Effort: Pulmonary effort is normal. No respiratory distress. Breath sounds: Normal breath sounds. Skin:     General: Skin is warm and dry. Comments: Large areas of plaque-like erythema/dry/flaking noted to neck, arms hairline. Severely pruritic. Some areas of excoriation. No concerns for infection. Neurological:      Mental Status: She is alert and oriented to person, place, and time. Psychiatric:         Thought Content:  Thought content normal.         Judgment: Judgment normal.     Assessment/Plan    1. Allergic dermatitis  Unsure of etiology of rash. Patient plans to continue with surgeon, may need sling removal.  Continue with daily Pepcid. Add daily Zyrtec and hydroxyzine. Can use topical triamcinolone for some areas of rash. No prednisone as this has not helped much. - cetirizine (ZYRTEC) 10 MG tablet; Take 1 tablet by mouth daily  Dispense: 30 tablet; Refill: 0  - hydrOXYzine pamoate (VISTARIL) 25 MG capsule; Take 1 capsule by mouth 3 times daily as needed for Itching  Dispense: 90 capsule; Refill: 0  - triamcinolone (KENALOG) 0.025 % cream; Apply Topically twice daily  Dispense: 70 g; Refill: 0     Claribel Youssef Mooring was counseled regarding symptoms of current diagnosis, course and complications of disease if inadequately treated. Discussed side effects of medications, diagnosis, treatment options, and prognosis along with risks, benefits, complications, and alternatives of treatment including labs, imaging and other studies/treatment targets and goals. She verbalized understanding of instructions and counseling. Return if symptoms worsen or fail to improve. Medical decision making of low complexity.

## 2023-01-03 ENCOUNTER — TELEPHONE (OUTPATIENT)
Dept: UROGYNECOLOGY | Age: 38
End: 2023-01-03

## 2023-01-03 NOTE — TELEPHONE ENCOUNTER
Called patient regarding how she is feeling due to report of \"passing out\" on JUAN. Took Ibuprofen and slept. Did not call or seek care. She reports she continues to have pain but it is stimulated by urinating or touching urethra. She is taking Ibuprofen with some relief. She did see her PCP who is treating her for the hives she has had since mid November. She attempted intercourse 12/24/22 but partner c/o pain from something sharp. She now has vaginal discharge. She will see Dr. Cal Jimenez tomorrow and is not in severe pain today.

## 2023-01-04 ENCOUNTER — OFFICE VISIT (OUTPATIENT)
Dept: UROGYNECOLOGY | Age: 38
End: 2023-01-04
Payer: COMMERCIAL

## 2023-01-04 VITALS
OXYGEN SATURATION: 99 % | RESPIRATION RATE: 16 BRPM | DIASTOLIC BLOOD PRESSURE: 69 MMHG | HEART RATE: 80 BPM | SYSTOLIC BLOOD PRESSURE: 104 MMHG | TEMPERATURE: 98.3 F

## 2023-01-04 DIAGNOSIS — N39.41 URGE INCONTINENCE: Primary | ICD-10-CM

## 2023-01-04 DIAGNOSIS — R21 RASH AND NONSPECIFIC SKIN ERUPTION: ICD-10-CM

## 2023-01-04 DIAGNOSIS — R35.0 URINARY FREQUENCY: ICD-10-CM

## 2023-01-04 DIAGNOSIS — N35.919 URETHRAL SPASM: ICD-10-CM

## 2023-01-04 PROCEDURE — 51701 INSERT BLADDER CATHETER: CPT | Performed by: OBSTETRICS & GYNECOLOGY

## 2023-01-04 PROCEDURE — 81002 URINALYSIS NONAUTO W/O SCOPE: CPT | Performed by: OBSTETRICS & GYNECOLOGY

## 2023-01-04 PROCEDURE — 99214 OFFICE O/P EST MOD 30 MIN: CPT | Performed by: OBSTETRICS & GYNECOLOGY

## 2023-01-04 RX ORDER — METHOCARBAMOL 500 MG/1
500 TABLET, FILM COATED ORAL 4 TIMES DAILY
Qty: 40 TABLET | Refills: 0 | Status: SHIPPED | OUTPATIENT
Start: 2023-01-04 | End: 2023-01-14

## 2023-01-04 RX ORDER — CIPROFLOXACIN 500 MG/1
500 TABLET, FILM COATED ORAL 2 TIMES DAILY
Qty: 20 TABLET | Refills: 0 | Status: SHIPPED | OUTPATIENT
Start: 2023-01-04 | End: 2023-01-14

## 2023-01-04 NOTE — PROGRESS NOTES
1/4/2023       HPI:     Name: Emery Pinzon  YOB: 1985    CC: Patient is a 40 y.o. presenting for evaluation of  genital rash, pain, and discharge . Patient reports she feels a stabbing pain when she urinates, and she thinks she is allergic to the mesh. HPI: How long have you had this problem? 12/31/22  Please rate the severity of your problem: moderately severe  Anything make it better? Ob/Gyn History:    OB History   No obstetric history on file. Past Medical History:   Past Medical History:   Diagnosis Date    Hepatitis C     asymptomatic    IV drug abuse ([facility] [address]     Ovarian cyst     Urinary incontinence 11/01/2022     Past Surgical History:   Past Surgical History:   Procedure Laterality Date    CYSTOSCOPY N/A 11/9/2022    [facility]  [facility]    diagnostic laparoscopy, KTP laser of endometriosis     LAPAROSCOPY  2017    for endometriosis    TUBAL LIGATION      URETHRAL SURGERY N/A 11/9/2022    [facility]UB[facility]ING [facility]    Allergies   Allergen Reactions    Penicillins Hives     Current Medications:  Current Outpatient Medications   Medication Sig Dispense Refill    ciprofloxacin (CIPRO) 500 MG tablet Take 1 tablet by mouth 2 times daily for 10 days 20 tablet 0    methocarbamol (ROBAXIN) 500 MG tablet Take 1 tablet by mouth 4 times daily for 10 days 40 tablet 0    oxybutynin (DITROPAN XL) 5 MG extended release tablet Take 1 tablet by mouth daily 30 tablet 1    cetirizine (ZYRTEC) 10 MG tablet Take 1 tablet by mouth daily 30 tablet 0    hydrOXYzine pamoate (VISTARIL) 25 MG capsule Take 1 capsule by mouth 3 times daily as needed for Itching 90 capsule 0    triamcinolone (KENALOG) 0.025 % cream Apply Topically twice daily 70 g 0    predniSONE (DELTASONE) 20 MG tablet 1 TID for 4 day then 1 BID for 4 days then 1 qd 24 tablet 0    famotidine (PEPCID) 40 MG tablet Take 1 tablet by mouth daily 30 tablet 3    rOPINIRole (REQUIP) 0.5 MG tablet Take 1 tablet by mouth at bedtime 90 tablet 0    ibuprofen (ADVIL;MOTRIN) 600 MG tablet Take 1 tablet by mouth every 6 hours as needed for Pain 56 tablet 0    ibuprofen (ADVIL;MOTRIN) 600 MG tablet Take 1 tablet by mouth three times daily for 10 days 15 tablet 1     No current facility-administered medications for this visit.      Social History:   Social History     Socioeconomic History    Marital status: Legally      Spouse name: Not on file    Number of children: Not on file    Years of education: Not on file    Highest education level: Not on file   Occupational History    Not on file   Tobacco Use    Smoking status: Former     Packs/day: 0.25     Types: Cigarettes     Quit date: 12/15/2018     Years since quittin.0    Smokeless tobacco: Never    Tobacco comments:     Not smoking at all now   Vaping Use    Vaping Use: Former    Quit date: 2021    Substances: Nicotine (twice a day)    Devices: Disposable   Substance and Sexual Activity    Alcohol use: No    Drug use: Not Currently     Types: IV     Comment: Been clean since 2013    Sexual activity: Not Currently   Other Topics Concern    Not on file   Social History Narrative    Not on file     Social Determinants of Health     Financial Resource Strain: Not on file   Food Insecurity: Not on file   Transportation Needs: Not on file   Physical Activity: Not on file   Stress: Not on file   Social Connections: Not on file   Intimate Partner Violence: Not on file   Housing Stability: Not on file     Family History:   Family History   Problem Relation Age of Onset    Substance Abuse Mother     Mental Illness Mother     Heart Disease Maternal Grandmother     Emphysema Maternal Grandmother     Diabetes Maternal Grandmother     Heart Disease Paternal Grandmother     Diabetes Paternal Grandfather          Objective:     Vitals  Vitals:    23 1336   BP: 104/69   Pulse: 80   Resp: 16 Temp: 98.3 °F (36.8 °C)   SpO2: 99%     Physical Exam  Physical Exam  HENT:      Head: Normocephalic and atraumatic. Eyes:      Conjunctiva/sclera: Conjunctivae normal.   Pulmonary:      Effort: Pulmonary effort is normal.   Abdominal:      Palpations: Abdomen is soft. Genitourinary:     Comments: Tender under the urethra as well as the pelvic floor muscles  Musculoskeletal:      Cervical back: Normal range of motion and neck supple. Skin:     General: Skin is warm and dry. Neurological:      Mental Status: She is alert and oriented to person, place, and time. Results for POC orders placed in visit on 01/04/23   POCT Urinalysis no Micro   Result Value Ref Range    Color, UA yellow     Clarity, UA clear     Glucose, UA POC neg     Bilirubin, UA neg     Ketones, UA neg     Spec Grav, UA 1.015     Blood, UA POC neg     pH, UA 6.5     Protein, UA POC neg     Urobilinogen, UA neg     Leukocytes, UA neg     Nitrite, UA neg        Assessment/Plan:     Aurelio Mane is a 40 y.o. female with   1. Urge incontinence    2. Urinary frequency    3. Rash and nonspecific skin eruption    4. Urethral spasm    Old records reviewed, outside records reviewed    Raghu Bush presents today with 2 main problems. The first is a rash that she has been treated for for the last few weeks by her primary care physician. While speaking to her today she was scratching and itching on her back as well as her chest.  The second is pain in the vagina especially when urinating. She also thought that she may have an extrusion of the sling however on my examination today there is no evidence of sling extrusion whatsoever. She is very tender under the urethra as well as the entire pelvic floor. I am not certain to the etiology of the rash but I do not think it has any correlation to her original surgery as much of this did not develop until 6 weeks after the surgery.   I am also not sure of the etiology of the discomfort in her pelvic floor. I am going to treat her with a antibiotic for presumed urinary tract infection. We did send off a urine culture today. I also gave her skeletal muscle relaxer. Orders Placed This Encounter   Procedures    Culture, Urine     Order Specific Question:   Specify (ex-cath, midstream, cysto, etc)?      Answer:   straight cath    POCT Urinalysis no Micro    80878 - SD INSERT,NON-INDWELLING BLADDER CATHETER       Orders Placed This Encounter   Medications    ciprofloxacin (CIPRO) 500 MG tablet     Sig: Take 1 tablet by mouth 2 times daily for 10 days     Dispense:  20 tablet     Refill:  0    methocarbamol (ROBAXIN) 500 MG tablet     Sig: Take 1 tablet by mouth 4 times daily for 10 days     Dispense:  40 tablet     Refill:  0         Katlin Workman MD

## 2023-01-05 ENCOUNTER — PATIENT MESSAGE (OUTPATIENT)
Dept: FAMILY MEDICINE CLINIC | Age: 38
End: 2023-01-05

## 2023-01-05 ENCOUNTER — TELEPHONE (OUTPATIENT)
Dept: UROGYNECOLOGY | Age: 38
End: 2023-01-05

## 2023-01-05 DIAGNOSIS — L23.9 ALLERGIC DERMATITIS: ICD-10-CM

## 2023-01-05 DIAGNOSIS — L23.9 ALLERGIC DERMATITIS: Primary | ICD-10-CM

## 2023-01-05 LAB
BASOPHILS ABSOLUTE: 0 K/UL (ref 0–0.2)
BASOPHILS RELATIVE PERCENT: 0.3 %
C-REACTIVE PROTEIN: <3 MG/L (ref 0–5.1)
EOSINOPHILS ABSOLUTE: 0.2 K/UL (ref 0–0.6)
EOSINOPHILS RELATIVE PERCENT: 4.3 %
HCT VFR BLD CALC: 41.3 % (ref 36–48)
HEMOGLOBIN: 13.7 G/DL (ref 12–16)
LYMPHOCYTES ABSOLUTE: 1.5 K/UL (ref 1–5.1)
LYMPHOCYTES RELATIVE PERCENT: 35.1 %
MCH RBC QN AUTO: 28.3 PG (ref 26–34)
MCHC RBC AUTO-ENTMCNC: 33.2 G/DL (ref 31–36)
MCV RBC AUTO: 85.3 FL (ref 80–100)
MONOCYTES ABSOLUTE: 0.3 K/UL (ref 0–1.3)
MONOCYTES RELATIVE PERCENT: 7.5 %
NEUTROPHILS ABSOLUTE: 2.2 K/UL (ref 1.7–7.7)
NEUTROPHILS RELATIVE PERCENT: 52.8 %
PDW BLD-RTO: 14.5 % (ref 12.4–15.4)
PLATELET # BLD: 161 K/UL (ref 135–450)
PMV BLD AUTO: 8.9 FL (ref 5–10.5)
RBC # BLD: 4.84 M/UL (ref 4–5.2)
SEDIMENTATION RATE, ERYTHROCYTE: 16 MM/HR (ref 0–20)
TSH REFLEX: 0.75 UIU/ML (ref 0.27–4.2)
WBC # BLD: 4.2 K/UL (ref 4–11)

## 2023-01-05 NOTE — TELEPHONE ENCOUNTER
From: Yumiko Ricks  To: Sarai Hernandez  Sent: 1/5/2023 12:37 AM EST  Subject: Rash    Roxanne we have to do something this rash is getting so much worse, I had an appointment with my surgeon today and he says there is no way Im allergic to the sling. I have called 7 different allergist and no one can get me in until July/August! Its 1230 am and I am sitting in an oatmeal bath because the itching is so bad however this isnt even helping anymore. All them meds that I am on for this just put me to sleep and the cream doesnt work. The meds were making me sleep at night until last night and now they have stopped, I have tried melatonin that doesnt help either. All I do is lay here and scratch. We have to do something I cant keep doing this. Im going crazy from itching, from not sleeping to, not knowing what is wrong.  Please call me How Severe Are Your Warts?: moderate Is This A New Presentation, Or A Follow-Up?: Warts Additional History: Patient states she treated warts with a medicine but doesn’t remember the name of it

## 2023-01-05 NOTE — TELEPHONE ENCOUNTER
Patient called because the pharmacy did not receive the cipro prescription from yesterday's visit. Called in this order for cipro to the pharmacy because confirmed they didn't receive it. No other concerns by patient.

## 2023-01-06 ENCOUNTER — TELEPHONE (OUTPATIENT)
Dept: UROGYNECOLOGY | Age: 38
End: 2023-01-06

## 2023-01-06 LAB
ANTI-NUCLEAR ANTIBODY (ANA): NEGATIVE
URINE CULTURE, ROUTINE: NORMAL

## 2023-01-06 NOTE — TELEPHONE ENCOUNTER
Sent Dr. Marianne Maxwell a message that patient wanted him to be aware of this and that she has an allergy appt on Monday. I called and spoke with patient and Patient has no further questions or concerns at this time, just wanted to let him know this.

## 2023-01-06 NOTE — TELEPHONE ENCOUNTER
Allergist appointment on Monday, still has hives - Patients biological father was allergic to his knee replacement.  Patient wanted to make Dr Katherine Mejia and Beto Solomon aware

## 2023-01-06 NOTE — TELEPHONE ENCOUNTER
Spoke with patient 1/5. Labs ordered. Recommended follow up with Allergist - list sent for patient to contact.

## 2023-01-09 ENCOUNTER — TELEPHONE (OUTPATIENT)
Dept: UROGYNECOLOGY | Age: 38
End: 2023-01-09

## 2023-01-09 NOTE — TELEPHONE ENCOUNTER
Patient called today and stated she saw her allergist today and they want a piece of the sling material that was placed during her surgery. She has a letter from them requesting this. Contacted Jeannette Alvarez in [place] and he gave me the rep contact info to get the material. Spoke with Sharon Colbert and she is bringing the lynx blue (sling that was inserted) on Wednesday to the [facility] office Ghanshyam Repress confirmed this was the same material implanted).   Called and spoke with patient-appt changed to Thursday at [facility] so she can  the material to take to her allergist.

## 2023-01-12 ENCOUNTER — OFFICE VISIT (OUTPATIENT)
Dept: UROGYNECOLOGY | Age: 38
End: 2023-01-12

## 2023-01-12 ENCOUNTER — TELEPHONE (OUTPATIENT)
Dept: FAMILY MEDICINE CLINIC | Age: 38
End: 2023-01-12

## 2023-01-12 VITALS
SYSTOLIC BLOOD PRESSURE: 130 MMHG | TEMPERATURE: 98 F | DIASTOLIC BLOOD PRESSURE: 74 MMHG | HEART RATE: 78 BPM | OXYGEN SATURATION: 98 % | RESPIRATION RATE: 14 BRPM

## 2023-01-12 DIAGNOSIS — Z09 POSTOP CHECK: Primary | ICD-10-CM

## 2023-01-12 DIAGNOSIS — G47.00 INSOMNIA, UNSPECIFIED TYPE: Primary | ICD-10-CM

## 2023-01-12 PROCEDURE — 99024 POSTOP FOLLOW-UP VISIT: CPT | Performed by: NURSE PRACTITIONER

## 2023-01-12 RX ORDER — DOXEPIN HYDROCHLORIDE 10 MG/1
10 CAPSULE ORAL NIGHTLY
Qty: 30 CAPSULE | Refills: 0 | Status: SHIPPED | OUTPATIENT
Start: 2023-01-12 | End: 2023-01-12

## 2023-01-12 RX ORDER — DOXEPIN HYDROCHLORIDE 10 MG/1
10 CAPSULE ORAL NIGHTLY
Qty: 30 CAPSULE | Refills: 0 | Status: SHIPPED | OUTPATIENT
Start: 2023-01-12

## 2023-01-12 NOTE — TELEPHONE ENCOUNTER
Pt called to ask for a medication to calm her down so she's able to sleep, Pt has had a  rash for 2 months and has an appt with an allergist for allergy testing. Pt spoke to Dr. Mason Trujillo on her previous visit.          [address] at  [address] [place] [address]  [phone]

## 2023-01-12 NOTE — PROGRESS NOTES
1/12/2023       HPI:     Name: Shannen Gonsalez  YOB: 1985    CC: Shannen Gonsalez is a 40 y.o. female who is here for a 9 week post op evaluation. HPI: Recently underwent RETROPUBIC SLING  CYSTOSCOPY. .    Still having extremely bothersome hives. And pain when urinating. Component 1/4/23 1413    Urine Culture, Routine No growth after 48 hours    Resulting Agency [address] [facility]     Muscle relaxer was not helpful. She is here to  the sling material for testing.     Past Medical History:   Past Medical History:   Diagnosis Date    Hepatitis C     asymptomatic    IV drug abuse ([facility] [address]     Ovarian cyst     Urinary incontinence 11/01/2022     Past Surgical History:   Past Surgical History:   Procedure Laterality Date    CYSTOSCOPY N/A 11/9/2022    CYSTOSCOPY performed by Sofie Mir MD at [address] [facility]  2/10/16    diagnostic laparoscopy, KTP laser of endometriosis     LAPAROSCOPY  2017    for endometriosis    TUBAL LIGATION      URETHRAL SURGERY N/A 11/9/2022    RETROPUBIC SLING performed by Sofie Mir MD at [place] [address]     Current Medications:  Current Outpatient Medications   Medication Sig Dispense Refill    methocarbamol (ROBAXIN) 500 MG tablet Take 1 tablet by mouth 4 times daily for 10 days 40 tablet 0    cetirizine (ZYRTEC) 10 MG tablet Take 1 tablet by mouth daily 30 tablet 0    hydrOXYzine pamoate (VISTARIL) 25 MG capsule Take 1 capsule by mouth 3 times daily as needed for Itching 90 capsule 0    triamcinolone (KENALOG) 0.025 % cream Apply Topically twice daily 70 g 0    famotidine (PEPCID) 40 MG tablet Take 1 tablet by mouth daily 30 tablet 3    rOPINIRole (REQUIP) 0.5 MG tablet Take 1 tablet by mouth at bedtime 90 tablet 0    oxybutynin (DITROPAN XL) 5 MG extended release tablet Take 1 tablet by mouth daily (Patient not taking: Reported on 1/12/2023) 30 tablet 1    ibuprofen (ADVIL;MOTRIN) 600 MG tablet Take 1 tablet by mouth every 6 hours as needed for Pain (Patient not taking: Reported on 2023) 56 tablet 0    ibuprofen (ADVIL;MOTRIN) 600 MG tablet Take 1 tablet by mouth three times daily for 10 days 15 tablet 1     No current facility-administered medications for this visit. Allergies:    Allergies   Allergen Reactions    Penicillins Hives     Social History:   Social History     Socioeconomic History    Marital status: Legally      Spouse name: Not on file    Number of children: Not on file    Years of education: Not on file    Highest education level: Not on file   Occupational History    Not on file   Tobacco Use    Smoking status: Former     Packs/day: 0.25     Types: Cigarettes     Quit date: 12/15/2018     Years since quittin.0    Smokeless tobacco: Never    Tobacco comments:     Not smoking at all now   Vaping Use    Vaping Use: Former    Quit date: 2021    Substances: Nicotine (twice a day)    Devices: Disposable   Substance and Sexual Activity    Alcohol use: No    Drug use: Not Currently     Types: IV     Comment: Been clean since 2013    Sexual activity: Not Currently   Other Topics Concern    Not on file   Social History Narrative    Not on file     Social Determinants of Health     Financial Resource Strain: Not on file   Food Insecurity: Not on file   Transportation Needs: Not on file   Physical Activity: Not on file   Stress: Not on file   Social Connections: Not on file   Intimate Partner Violence: Not on file   Housing Stability: Not on file     Family History:   Family History   Problem Relation Age of Onset    Substance Abuse Mother     Mental Illness Mother     Heart Disease Maternal Grandmother     Emphysema Maternal Grandmother     Diabetes Maternal Grandmother     Heart Disease Paternal Grandmother     Diabetes Paternal Grandfather          Objective:     Vitals  Vitals:    23 1009   BP: 130/74   Pulse: 78   Resp: 14   Temp: 98 °F (36.7 °C)   SpO2: 98%     Physical Exam  Physical Exam  Vitals and nursing note reviewed. Constitutional:       Appearance: Normal appearance. HENT:      Head: Normocephalic. Eyes:      Conjunctiva/sclera: Conjunctivae normal.   Cardiovascular:      Rate and Rhythm: Normal rate. Pulmonary:      Effort: Pulmonary effort is normal.   Musculoskeletal:         General: Normal range of motion. Cervical back: Normal range of motion. Skin:     General: Skin is warm and dry. Neurological:      General: No focal deficit present. Mental Status: She is alert. Psychiatric:         Mood and Affect: Mood normal.         Behavior: Behavior normal.         No results found for this visit on 01/12/23. Assessnent/Plan:     Maxim Rust is a 40 y.o. female with   1. Postop check        Patient is  9 weeks post op . Given sling material for allergist  Still with on and off urethral pain. No longer taking muscle relaxer  Urine culture negative  Will call us upon return of her allergy testing  LINA Burns - CNP      No orders of the defined types were placed in this encounter. No orders of the defined types were placed in this encounter.       LINA Burns - CNP

## 2023-01-12 NOTE — TELEPHONE ENCOUNTER
Prescription sent to [facility].  Start doxepin nightly - take 30 minutes prior to bedtime.

## 2023-01-18 ENCOUNTER — TELEPHONE (OUTPATIENT)
Dept: UROGYNECOLOGY | Age: 38
End: 2023-01-18

## 2023-01-20 ENCOUNTER — TELEPHONE (OUTPATIENT)
Dept: UROGYNECOLOGY | Age: 38
End: 2023-01-20

## 2023-01-20 NOTE — TELEPHONE ENCOUNTER
Patient called to inquire about appts for next week because she starts a new job. Dr. Haleigh Smith requested the official letter be sent from allergist. Letter face page scanned into patient's media stating they found patient is allergic to the mesh-see Media tab. Patient is bringing the full packet with her to her appt next week. Patient tentatively scheduled Wednesday 1/25 but she may have to reschedule due to her job. Appt made. Dr. Haleigh Smith aware. Patient has no further questions or concerns at this time.

## 2023-01-25 ENCOUNTER — OFFICE VISIT (OUTPATIENT)
Dept: UROGYNECOLOGY | Age: 38
End: 2023-01-25
Payer: COMMERCIAL

## 2023-01-25 VITALS
SYSTOLIC BLOOD PRESSURE: 133 MMHG | RESPIRATION RATE: 14 BRPM | OXYGEN SATURATION: 99 % | DIASTOLIC BLOOD PRESSURE: 81 MMHG | HEART RATE: 78 BPM | TEMPERATURE: 98 F

## 2023-01-25 DIAGNOSIS — R21 RASH AND NONSPECIFIC SKIN ERUPTION: Primary | ICD-10-CM

## 2023-01-25 DIAGNOSIS — T78.40XD ALLERGIC REACTION, SUBSEQUENT ENCOUNTER: ICD-10-CM

## 2023-01-25 PROCEDURE — 99214 OFFICE O/P EST MOD 30 MIN: CPT | Performed by: OBSTETRICS & GYNECOLOGY

## 2023-01-25 RX ORDER — SODIUM CHLORIDE 0.9 % (FLUSH) 0.9 %
5-40 SYRINGE (ML) INJECTION PRN
OUTPATIENT
Start: 2023-01-25

## 2023-01-25 RX ORDER — SODIUM CHLORIDE 9 MG/ML
INJECTION, SOLUTION INTRAVENOUS PRN
OUTPATIENT
Start: 2023-01-25

## 2023-01-25 RX ORDER — SODIUM CHLORIDE 0.9 % (FLUSH) 0.9 %
5-40 SYRINGE (ML) INJECTION EVERY 12 HOURS SCHEDULED
OUTPATIENT
Start: 2023-01-25

## 2023-01-25 NOTE — PROGRESS NOTES
1/25/2023       HPI:     Name: Lauryn Rivera  YOB: 1985    CC: Patient is a 40 y.o. presenting for evaluation of  surgical consent . HPI: How long have you had this problem? Please rate the severity of your problem: moderately severe  Anything make it better? Ob/Gyn History:    OB History   No obstetric history on file. Past Medical History:   Past Medical History:   Diagnosis Date    Hepatitis C     asymptomatic    IV drug abuse ([facility] [address]     Ovarian cyst     Urinary incontinence 11/01/2022     Past Surgical History:   Past Surgical History:   Procedure Laterality Date    CYSTOSCOPY N/A 11/9/2022    CYSTOSCOPY performed by Jessica Alexis MD at [address] [facility]  2/10/16    diagnostic laparoscopy, KTP laser of endometriosis     LAPAROSCOPY  2017    for endometriosis    TUBAL LIGATION      URETHRAL SURGERY N/A 11/9/2022    RETROPUBIC SLING performed by Jessica Alexis MD at [address]: Allergies   Allergen Reactions    Penicillins Hives     Current Medications:  Current Outpatient Medications   Medication Sig Dispense Refill    doxepin (SINEQUAN) 10 MG capsule Take 1 capsule by mouth nightly 30 capsule 0    oxybutynin (DITROPAN XL) 5 MG extended release tablet Take 1 tablet by mouth daily 30 tablet 1    triamcinolone (KENALOG) 0.025 % cream Apply Topically twice daily 70 g 0    famotidine (PEPCID) 40 MG tablet Take 1 tablet by mouth daily 30 tablet 3    rOPINIRole (REQUIP) 0.5 MG tablet Take 1 tablet by mouth at bedtime 90 tablet 0    ibuprofen (ADVIL;MOTRIN) 600 MG tablet Take 1 tablet by mouth every 6 hours as needed for Pain (Patient not taking: Reported on 1/25/2023) 56 tablet 0    ibuprofen (ADVIL;MOTRIN) 600 MG tablet Take 1 tablet by mouth three times daily for 10 days 15 tablet 1     No current facility-administered medications for this visit.      Social History:   Social History     Socioeconomic History    Marital status: Legally      Spouse name: Not on file    Number of children: Not on file    Years of education: Not on file    Highest education level: Not on file   Occupational History    Not on file   Tobacco Use    Smoking status: Former     Packs/day: 0.25     Types: Cigarettes     Quit date: 12/15/2018     Years since quittin.1    Smokeless tobacco: Never    Tobacco comments:     Not smoking at all now   Vaping Use    Vaping Use: Former    Quit date: 2021    Substances: Nicotine (twice a day)    Devices: Disposable   Substance and Sexual Activity    Alcohol use: No    Drug use: Not Currently     Types: IV     Comment: Been clean since 2013    Sexual activity: Not Currently   Other Topics Concern    Not on file   Social History Narrative    Not on file     Social Determinants of Health     Financial Resource Strain: Not on file   Food Insecurity: Not on file   Transportation Needs: Not on file   Physical Activity: Not on file   Stress: Not on file   Social Connections: Not on file   Intimate Partner Violence: Not on file   Housing Stability: Not on file     Family History:   Family History   Problem Relation Age of Onset    Substance Abuse Mother     Mental Illness Mother     Heart Disease Maternal Grandmother     Emphysema Maternal Grandmother     Diabetes Maternal Grandmother     Heart Disease Paternal Grandmother     Diabetes Paternal Grandfather          Objective:     Vitals  Vitals:    23 1532   BP: 133/81   Pulse: 78   Resp: 14   Temp: 98 °F (36.7 °C)   SpO2: 99%     Physical Exam  Physical Exam  HENT:      Head: Normocephalic and atraumatic. Eyes:      Conjunctiva/sclera: Conjunctivae normal.   Pulmonary:      Effort: Pulmonary effort is normal.   Abdominal:      Palpations: Abdomen is soft. Musculoskeletal:      Cervical back: Normal range of motion and neck supple. Skin:     General: Skin is warm and dry. Findings: Rash present.    Neurological:      Mental Status: She is alert and oriented to person, place, and time. No results found for this visit on 01/25/23. Assessment/Plan:     Nargis Evans is a 40 y.o. female with   1. Rash and nonspecific skin eruption    2. Allergic reaction, subsequent encounter    Old records reviewed, outside records reviewed  [place] presented today as a follow-up for generalized rash and itching. She underwent a suburethral sling by me on November 9, 2022. She presented to me on January 4 at which time she was complaining of scratching and itching on her back and chest.  I doubted at the time that this was related to the suburethral sling because it happened approximately 6 weeks after her original surgery. She was subsequently evaluated by Dr. Koo Given an allergist who evaluated her and felt that the itching was related to the sling implant. Because of this I did speak with her today about complete removal of the sling. She understands that this will be a stepwise process and the fact that it is difficult to remove the sling and will need to be done with both vaginal and abdominal incision but that she will also have a recurrence of her stress incontinence that will need to be dealt with later. The patient was counseled on surgical and non-surgical options. The patient elected to move forward with surgery because of worsening symptoms. The risks and benefits of surgery including but not limited to bleeding, infection, injury to bowel, bladder, ureter, or other internal organs, transfusion, pain, bowel dysfunction, urinary incontinence, and sexual dysfunction were discussed at length. All questions were answered to the patients satisfaction. The patient elected to undergo cystourethroscopy and removal of suburethral sling with vaginal and abdominal exploration . The risk and benefits of synthetic material, including mesh, were explained to the patient and all questions were answered.    Preop orders were placed  Orders Placed This Encounter   Procedures Initiate PAT Protocol     Standing Status:   Future     Standing Expiration Date:   3/26/2023       No orders of the defined types were placed in this encounter.       David Garcia MD

## 2023-01-26 NOTE — PROGRESS NOTES
DOS 23   85    PATIENT SAYS SHE WAS JUST SCHEDULED YESTERDAY FOR HER SURGERY ON 23-PATIENT STATES IT DOESN'T GIVE HER ENOUGH TIME TO GO SEE HER PCP-DR. ISABEL AWARE-SPOKE TO FAREED AT OFFICE AND SHE SAID SHE WILL CHECK WITH DR. Miya Martinez TO CONFIRM HE WILL BE DOING H&P AND CALL PAT BACK    UPDATE: 23  PAT RN REACHED OUT TO FAREED THROUGH TEAMS-DR. ISABEL HAS BEEN IN SURGERY ALL DAY SHE WILL CHECK WITH HIM IN AM AND GET LET PAT KNOW IF HE WILL DO H&P

## 2023-01-26 NOTE — PROGRESS NOTES
[address] [facility] time____0755________        Surgery time________0955____    Take the following medications with a sip of water: Follow your MD/Surgeons pre-procedure instructions regarding your medications     Do not eat or drink anything after 12:00 midnight prior to your surgery. This includes water chewing gum, mints and ice chips. You may brush your teeth and gargle the morning of your surgery, but do not swallow the water     Please see your family doctor/pediatrician for a history and physical and/or concerning medications. Bring any test results/reports from your physicians office. If you are under the care of a heart doctor or specialist doctor, please be aware that you may be asked to them for clearance    You may be asked to stop blood thinners such as Coumadin, Plavix, Fragmin, Lovenox, etc., or any anti-inflammatories such as:  Aspirin, Ibuprofen, Advil, Naproxen prior to your surgery. We also ask that you stop any OTC medications such as fish oil, vitamin E, glucosamine, garlic, Multivitamins, COQ 10, etc. MAY TAKE TYLENOL    We ask that you do not smoke 24 hours prior to surgery  We ask that you do not  drink any alcoholic beverages 24 hours prior to surgery     You must make arrangements for a responsible adult to take you home after your surgery. For your safety you will not be allowed to leave alone or drive yourself home. Your surgery will be cancelled if you do not have a ride home. Also for your safety, it is strongly suggested that someone stay with you the first 24 hours after your surgery. A parent or legal guardian must accompany a child scheduled for surgery and plan to stay at the hospital until the child is discharged. Please do not bring other children with you. For your comfort, please wear simple loose fitting clothing to the hospital.  Please do not bring valuables.     Do not wear any make-up or nail polish on your fingers or toes      For your safety, please do not wear any jewelry or body piercing's on the day of surgery. All jewelry must be removed. If you have dentures, they will be removed before going to operating room. For your convenience, we will provide you with a container. If you wear contact lenses or glasses, they will be removed, please bring a case for them. If you have a living will and a durable power of  for healthcare, please bring in a copy. As part of our patient safety program to minimize surgical site infections, we ask you to do the following:    Please notify your surgeon if you develop any illness between         now and the  day of your surgery. This includes a cough, cold, fever, sore throat, nausea,         or vomiting, and diarrhea, etc.   Please notify your surgeon if you experience dizziness, shortness         of breath or blurred vision between now and the time of your surgery. Do not shave your operative site 96 hours prior to surgery. For face and neck surgery, men may use an electric razor 48 hours   prior to surgery. You may shower the night before surgery or the morning of   your surgery with an antibacterial soap. You will need to bring a photo ID and insurance card    [facility] has an onsite pharmacy, would you like to utilize our pharmacy     If you will be staying overnight and use a C-pap machine, please bring   your C-pap to hospital     Our goal is to provide you with excellent care, therefore, visitors will be limited to two(2) in the room at a time so that we may focus on providing this care for you. Please contact pre-admission testing if you have any further questions.                  [facility] phone number:  [phone] [facility] fax number:  [phone]  Please note these are generalized instructions for all surgical cases, you may be provided with more specific instructions according to your surgery. C-Difficile admission screening and protocol:       * Admitted with diarrhea? [] YES    [x]  NO     *Prior history of C-Diff. In last 3 months? [] YES    [x]  NO     *Antibiotic use in the past 6-8 weeks? [x]  NO    []  YES                 If yes, which ANTIBIOTIC AND REASON______     *Prior hospitalization or nursing home in the last month? []  YES    [x]  NO        SAFETY FIRST. .call before you fall

## 2023-01-27 ENCOUNTER — ANESTHESIA EVENT (OUTPATIENT)
Dept: OPERATING ROOM | Age: 38
DRG: 983 | End: 2023-01-27
Payer: COMMERCIAL

## 2023-01-30 ENCOUNTER — ANESTHESIA (OUTPATIENT)
Dept: OPERATING ROOM | Age: 38
DRG: 983 | End: 2023-01-30
Payer: COMMERCIAL

## 2023-01-30 ENCOUNTER — HOSPITAL ENCOUNTER (INPATIENT)
Age: 38
LOS: 1 days | Discharge: HOME | DRG: 983 | End: 2023-01-31
Attending: OBSTETRICS & GYNECOLOGY | Admitting: OBSTETRICS & GYNECOLOGY
Payer: COMMERCIAL

## 2023-01-30 DIAGNOSIS — L23.9 ALLERGIC CONTACT DERMATITIS OF GENITALIA IN FEMALE: ICD-10-CM

## 2023-01-30 DIAGNOSIS — G89.18 POST-OP PAIN: Primary | ICD-10-CM

## 2023-01-30 PROBLEM — T78.40XA ALLERGIC REACTION TO SUBSTANCE: Status: ACTIVE | Noted: 2023-01-30

## 2023-01-30 LAB — PREGNANCY, URINE: NEGATIVE

## 2023-01-30 PROCEDURE — 0TSD0ZZ REPOSITION URETHRA, OPEN APPROACH: ICD-10-PCS | Performed by: OBSTETRICS & GYNECOLOGY

## 2023-01-30 PROCEDURE — 6370000000 HC RX 637 (ALT 250 FOR IP): Performed by: OBSTETRICS & GYNECOLOGY

## 2023-01-30 PROCEDURE — 2580000003 HC RX 258: Performed by: OBSTETRICS & GYNECOLOGY

## 2023-01-30 PROCEDURE — 6360000002 HC RX W HCPCS: Performed by: OBSTETRICS & GYNECOLOGY

## 2023-01-30 PROCEDURE — 3600000004 HC SURGERY LEVEL 4 BASE: Performed by: OBSTETRICS & GYNECOLOGY

## 2023-01-30 PROCEDURE — 88300 SURGICAL PATH GROSS: CPT

## 2023-01-30 PROCEDURE — 84703 CHORIONIC GONADOTROPIN ASSAY: CPT

## 2023-01-30 PROCEDURE — 6360000002 HC RX W HCPCS: Performed by: ANESTHESIOLOGY

## 2023-01-30 PROCEDURE — 2580000003 HC RX 258: Performed by: ANESTHESIOLOGY

## 2023-01-30 PROCEDURE — A4217 STERILE WATER/SALINE, 500 ML: HCPCS | Performed by: OBSTETRICS & GYNECOLOGY

## 2023-01-30 PROCEDURE — G0378 HOSPITAL OBSERVATION PER HR: HCPCS

## 2023-01-30 PROCEDURE — 1220000000 HC SEMI PRIVATE OB R&B

## 2023-01-30 PROCEDURE — 2500000003 HC RX 250 WO HCPCS: Performed by: ANESTHESIOLOGY

## 2023-01-30 PROCEDURE — 3600000014 HC SURGERY LEVEL 4 ADDTL 15MIN: Performed by: OBSTETRICS & GYNECOLOGY

## 2023-01-30 PROCEDURE — 3700000001 HC ADD 15 MINUTES (ANESTHESIA): Performed by: OBSTETRICS & GYNECOLOGY

## 2023-01-30 PROCEDURE — 7100000000 HC PACU RECOVERY - FIRST 15 MIN: Performed by: OBSTETRICS & GYNECOLOGY

## 2023-01-30 PROCEDURE — 2500000003 HC RX 250 WO HCPCS: Performed by: OBSTETRICS & GYNECOLOGY

## 2023-01-30 PROCEDURE — 2709999900 HC NON-CHARGEABLE SUPPLY: Performed by: OBSTETRICS & GYNECOLOGY

## 2023-01-30 PROCEDURE — 7100000001 HC PACU RECOVERY - ADDTL 15 MIN: Performed by: OBSTETRICS & GYNECOLOGY

## 2023-01-30 PROCEDURE — 6360000002 HC RX W HCPCS

## 2023-01-30 PROCEDURE — 3700000000 HC ANESTHESIA ATTENDED CARE: Performed by: OBSTETRICS & GYNECOLOGY

## 2023-01-30 RX ORDER — ONDANSETRON 2 MG/ML
4 INJECTION INTRAMUSCULAR; INTRAVENOUS
Status: DISCONTINUED | OUTPATIENT
Start: 2023-01-30 | End: 2023-01-30 | Stop reason: HOSPADM

## 2023-01-30 RX ORDER — DEXMEDETOMIDINE HYDROCHLORIDE 100 UG/ML
INJECTION, SOLUTION INTRAVENOUS PRN
Status: DISCONTINUED | OUTPATIENT
Start: 2023-01-30 | End: 2023-01-30 | Stop reason: SDUPTHER

## 2023-01-30 RX ORDER — SODIUM CHLORIDE 0.9 % (FLUSH) 0.9 %
5-40 SYRINGE (ML) INJECTION EVERY 12 HOURS SCHEDULED
Status: DISCONTINUED | OUTPATIENT
Start: 2023-01-30 | End: 2023-01-31 | Stop reason: HOSPADM

## 2023-01-30 RX ORDER — KETOROLAC TROMETHAMINE 30 MG/ML
30 INJECTION, SOLUTION INTRAMUSCULAR; INTRAVENOUS EVERY 6 HOURS
Status: COMPLETED | OUTPATIENT
Start: 2023-01-30 | End: 2023-01-31

## 2023-01-30 RX ORDER — MIDAZOLAM HYDROCHLORIDE 1 MG/ML
INJECTION INTRAMUSCULAR; INTRAVENOUS PRN
Status: DISCONTINUED | OUTPATIENT
Start: 2023-01-30 | End: 2023-01-30 | Stop reason: SDUPTHER

## 2023-01-30 RX ORDER — SODIUM CHLORIDE 9 MG/ML
INJECTION, SOLUTION INTRAVENOUS PRN
Status: DISCONTINUED | OUTPATIENT
Start: 2023-01-30 | End: 2023-01-30 | Stop reason: HOSPADM

## 2023-01-30 RX ORDER — DEXAMETHASONE SODIUM PHOSPHATE 4 MG/ML
INJECTION, SOLUTION INTRA-ARTICULAR; INTRALESIONAL; INTRAMUSCULAR; INTRAVENOUS; SOFT TISSUE PRN
Status: DISCONTINUED | OUTPATIENT
Start: 2023-01-30 | End: 2023-01-30 | Stop reason: SDUPTHER

## 2023-01-30 RX ORDER — SODIUM CHLORIDE 0.9 % (FLUSH) 0.9 %
5-40 SYRINGE (ML) INJECTION PRN
Status: DISCONTINUED | OUTPATIENT
Start: 2023-01-30 | End: 2023-01-31 | Stop reason: HOSPADM

## 2023-01-30 RX ORDER — SIMETHICONE 80 MG
160 TABLET,CHEWABLE ORAL 4 TIMES DAILY PRN
Status: DISCONTINUED | OUTPATIENT
Start: 2023-01-30 | End: 2023-01-31 | Stop reason: HOSPADM

## 2023-01-30 RX ORDER — FENTANYL CITRATE 50 UG/ML
INJECTION, SOLUTION INTRAMUSCULAR; INTRAVENOUS PRN
Status: DISCONTINUED | OUTPATIENT
Start: 2023-01-30 | End: 2023-01-30 | Stop reason: SDUPTHER

## 2023-01-30 RX ORDER — FENTANYL CITRATE 50 UG/ML
25 INJECTION, SOLUTION INTRAMUSCULAR; INTRAVENOUS EVERY 5 MIN PRN
Status: DISCONTINUED | OUTPATIENT
Start: 2023-01-30 | End: 2023-01-30 | Stop reason: HOSPADM

## 2023-01-30 RX ORDER — SODIUM CHLORIDE 9 MG/ML
INJECTION, SOLUTION INTRAVENOUS PRN
Status: DISCONTINUED | OUTPATIENT
Start: 2023-01-30 | End: 2023-01-31 | Stop reason: HOSPADM

## 2023-01-30 RX ORDER — ONDANSETRON 2 MG/ML
4 INJECTION INTRAMUSCULAR; INTRAVENOUS EVERY 6 HOURS PRN
Status: DISCONTINUED | OUTPATIENT
Start: 2023-01-30 | End: 2023-01-31 | Stop reason: HOSPADM

## 2023-01-30 RX ORDER — SODIUM CHLORIDE 0.9 % (FLUSH) 0.9 %
5-40 SYRINGE (ML) INJECTION PRN
Status: DISCONTINUED | OUTPATIENT
Start: 2023-01-30 | End: 2023-01-30 | Stop reason: HOSPADM

## 2023-01-30 RX ORDER — FAMOTIDINE 10 MG/ML
INJECTION, SOLUTION INTRAVENOUS PRN
Status: DISCONTINUED | OUTPATIENT
Start: 2023-01-30 | End: 2023-01-30 | Stop reason: SDUPTHER

## 2023-01-30 RX ORDER — GLYCOPYRROLATE 0.2 MG/ML
INJECTION INTRAMUSCULAR; INTRAVENOUS PRN
Status: DISCONTINUED | OUTPATIENT
Start: 2023-01-30 | End: 2023-01-30 | Stop reason: SDUPTHER

## 2023-01-30 RX ORDER — MAGNESIUM HYDROXIDE 1200 MG/15ML
LIQUID ORAL CONTINUOUS PRN
Status: COMPLETED | OUTPATIENT
Start: 2023-01-30 | End: 2023-01-30

## 2023-01-30 RX ORDER — PHENYLEPHRINE HCL IN 0.9% NACL 1 MG/10 ML
SYRINGE (ML) INTRAVENOUS PRN
Status: DISCONTINUED | OUTPATIENT
Start: 2023-01-30 | End: 2023-01-30 | Stop reason: SDUPTHER

## 2023-01-30 RX ORDER — SODIUM CHLORIDE 0.9 % (FLUSH) 0.9 %
5-40 SYRINGE (ML) INJECTION EVERY 12 HOURS SCHEDULED
Status: DISCONTINUED | OUTPATIENT
Start: 2023-01-30 | End: 2023-01-30 | Stop reason: SDUPTHER

## 2023-01-30 RX ORDER — KETOROLAC TROMETHAMINE 30 MG/ML
INJECTION, SOLUTION INTRAMUSCULAR; INTRAVENOUS PRN
Status: DISCONTINUED | OUTPATIENT
Start: 2023-01-30 | End: 2023-01-30 | Stop reason: SDUPTHER

## 2023-01-30 RX ORDER — OXYCODONE HYDROCHLORIDE 10 MG/1
10 TABLET ORAL EVERY 4 HOURS PRN
Status: DISCONTINUED | OUTPATIENT
Start: 2023-01-30 | End: 2023-01-31 | Stop reason: HOSPADM

## 2023-01-30 RX ORDER — PROPOFOL 10 MG/ML
INJECTION, EMULSION INTRAVENOUS PRN
Status: DISCONTINUED | OUTPATIENT
Start: 2023-01-30 | End: 2023-01-30 | Stop reason: SDUPTHER

## 2023-01-30 RX ORDER — SODIUM CHLORIDE 0.9 % (FLUSH) 0.9 %
5-40 SYRINGE (ML) INJECTION EVERY 12 HOURS SCHEDULED
Status: DISCONTINUED | OUTPATIENT
Start: 2023-01-30 | End: 2023-01-30 | Stop reason: HOSPADM

## 2023-01-30 RX ORDER — ROPINIROLE 0.5 MG/1
0.5 TABLET, FILM COATED ORAL NIGHTLY
Status: DISCONTINUED | OUTPATIENT
Start: 2023-01-30 | End: 2023-01-31 | Stop reason: HOSPADM

## 2023-01-30 RX ORDER — SUCCINYLCHOLINE/SOD CL,ISO/PF 200MG/10ML
SYRINGE (ML) INTRAVENOUS PRN
Status: DISCONTINUED | OUTPATIENT
Start: 2023-01-30 | End: 2023-01-30 | Stop reason: SDUPTHER

## 2023-01-30 RX ORDER — SODIUM CHLORIDE 0.9 % (FLUSH) 0.9 %
5-40 SYRINGE (ML) INJECTION PRN
Status: DISCONTINUED | OUTPATIENT
Start: 2023-01-30 | End: 2023-01-30 | Stop reason: SDUPTHER

## 2023-01-30 RX ORDER — PROMETHAZINE HYDROCHLORIDE 25 MG/1
12.5 TABLET ORAL EVERY 6 HOURS PRN
Status: DISCONTINUED | OUTPATIENT
Start: 2023-01-30 | End: 2023-01-31 | Stop reason: HOSPADM

## 2023-01-30 RX ORDER — OXYCODONE HYDROCHLORIDE 5 MG/1
5 TABLET ORAL EVERY 4 HOURS PRN
Status: DISCONTINUED | OUTPATIENT
Start: 2023-01-30 | End: 2023-01-31 | Stop reason: HOSPADM

## 2023-01-30 RX ORDER — SODIUM CHLORIDE 9 MG/ML
INJECTION, SOLUTION INTRAVENOUS PRN
Status: DISCONTINUED | OUTPATIENT
Start: 2023-01-30 | End: 2023-01-30 | Stop reason: SDUPTHER

## 2023-01-30 RX ORDER — OXYCODONE HYDROCHLORIDE 5 MG/1
5 TABLET ORAL
Status: DISCONTINUED | OUTPATIENT
Start: 2023-01-30 | End: 2023-01-30 | Stop reason: HOSPADM

## 2023-01-30 RX ORDER — DROPERIDOL 2.5 MG/ML
0.62 INJECTION, SOLUTION INTRAMUSCULAR; INTRAVENOUS
Status: DISCONTINUED | OUTPATIENT
Start: 2023-01-30 | End: 2023-01-30 | Stop reason: HOSPADM

## 2023-01-30 RX ORDER — LIDOCAINE HYDROCHLORIDE 20 MG/ML
INJECTION, SOLUTION EPIDURAL; INFILTRATION; INTRACAUDAL; PERINEURAL PRN
Status: DISCONTINUED | OUTPATIENT
Start: 2023-01-30 | End: 2023-01-30 | Stop reason: SDUPTHER

## 2023-01-30 RX ORDER — SODIUM CHLORIDE 9 MG/ML
INJECTION, SOLUTION INTRAVENOUS CONTINUOUS
Status: DISCONTINUED | OUTPATIENT
Start: 2023-01-30 | End: 2023-01-31 | Stop reason: HOSPADM

## 2023-01-30 RX ORDER — KETAMINE HCL IN NACL, ISO-OSM 100MG/10ML
SYRINGE (ML) INJECTION PRN
Status: DISCONTINUED | OUTPATIENT
Start: 2023-01-30 | End: 2023-01-30 | Stop reason: SDUPTHER

## 2023-01-30 RX ORDER — ONDANSETRON 2 MG/ML
INJECTION INTRAMUSCULAR; INTRAVENOUS PRN
Status: DISCONTINUED | OUTPATIENT
Start: 2023-01-30 | End: 2023-01-30 | Stop reason: SDUPTHER

## 2023-01-30 RX ORDER — ROCURONIUM BROMIDE 10 MG/ML
INJECTION, SOLUTION INTRAVENOUS PRN
Status: DISCONTINUED | OUTPATIENT
Start: 2023-01-30 | End: 2023-01-30 | Stop reason: SDUPTHER

## 2023-01-30 RX ADMIN — CEFAZOLIN 2000 MG: 2 INJECTION, POWDER, FOR SOLUTION INTRAMUSCULAR; INTRAVENOUS at 10:05

## 2023-01-30 RX ADMIN — Medication 50 MCG: at 11:21

## 2023-01-30 RX ADMIN — SUGAMMADEX 50 MG: 100 INJECTION, SOLUTION INTRAVENOUS at 11:29

## 2023-01-30 RX ADMIN — DEXMEDETOMIDINE HYDROCHLORIDE 4 MCG: 100 INJECTION, SOLUTION INTRAVENOUS at 10:42

## 2023-01-30 RX ADMIN — HYDROMORPHONE HYDROCHLORIDE 0.5 MG: 1 INJECTION, SOLUTION INTRAMUSCULAR; INTRAVENOUS; SUBCUTANEOUS at 10:22

## 2023-01-30 RX ADMIN — ROCURONIUM BROMIDE 5 MG: 10 INJECTION INTRAVENOUS at 10:00

## 2023-01-30 RX ADMIN — DEXMEDETOMIDINE HYDROCHLORIDE 2 MCG: 100 INJECTION, SOLUTION INTRAVENOUS at 10:45

## 2023-01-30 RX ADMIN — GLYCOPYRROLATE 0.1 MG: 0.2 INJECTION, SOLUTION INTRAMUSCULAR; INTRAVENOUS at 11:21

## 2023-01-30 RX ADMIN — FAMOTIDINE 20 MG: 10 INJECTION, SOLUTION INTRAVENOUS at 09:54

## 2023-01-30 RX ADMIN — FENTANYL CITRATE 50 MCG: 50 INJECTION INTRAMUSCULAR; INTRAVENOUS at 10:00

## 2023-01-30 RX ADMIN — PROPOFOL 150 MG: 10 INJECTION, EMULSION INTRAVENOUS at 10:00

## 2023-01-30 RX ADMIN — DEXMEDETOMIDINE HYDROCHLORIDE 4 MCG: 100 INJECTION, SOLUTION INTRAVENOUS at 11:03

## 2023-01-30 RX ADMIN — SUGAMMADEX 50 MG: 100 INJECTION, SOLUTION INTRAVENOUS at 11:30

## 2023-01-30 RX ADMIN — DEXAMETHASONE SODIUM PHOSPHATE 8 MG: 4 INJECTION, SOLUTION INTRAMUSCULAR; INTRAVENOUS at 10:20

## 2023-01-30 RX ADMIN — GLYCOPYRROLATE 0.1 MG: 0.2 INJECTION, SOLUTION INTRAMUSCULAR; INTRAVENOUS at 10:45

## 2023-01-30 RX ADMIN — ONDANSETRON 4 MG: 2 INJECTION INTRAMUSCULAR; INTRAVENOUS at 11:22

## 2023-01-30 RX ADMIN — DEXMEDETOMIDINE HYDROCHLORIDE 4 MCG: 100 INJECTION, SOLUTION INTRAVENOUS at 11:32

## 2023-01-30 RX ADMIN — KETOROLAC TROMETHAMINE 30 MG: 30 INJECTION, SOLUTION INTRAMUSCULAR at 23:15

## 2023-01-30 RX ADMIN — SUGAMMADEX 50 MG: 100 INJECTION, SOLUTION INTRAVENOUS at 11:31

## 2023-01-30 RX ADMIN — SODIUM CHLORIDE: 9 INJECTION, SOLUTION INTRAVENOUS at 20:00

## 2023-01-30 RX ADMIN — Medication 140 MG: at 10:00

## 2023-01-30 RX ADMIN — PROPOFOL 50 MG: 10 INJECTION, EMULSION INTRAVENOUS at 10:20

## 2023-01-30 RX ADMIN — LIDOCAINE HYDROCHLORIDE 80 MG: 20 INJECTION, SOLUTION EPIDURAL; INFILTRATION; INTRACAUDAL; PERINEURAL at 10:00

## 2023-01-30 RX ADMIN — FENTANYL CITRATE 50 MCG: 50 INJECTION INTRAMUSCULAR; INTRAVENOUS at 10:11

## 2023-01-30 RX ADMIN — OXYCODONE HYDROCHLORIDE 10 MG: 10 TABLET ORAL at 20:18

## 2023-01-30 RX ADMIN — SODIUM CHLORIDE: 9 INJECTION, SOLUTION INTRAVENOUS at 09:56

## 2023-01-30 RX ADMIN — DEXMEDETOMIDINE HYDROCHLORIDE 4 MCG: 100 INJECTION, SOLUTION INTRAVENOUS at 10:34

## 2023-01-30 RX ADMIN — OXYCODONE HYDROCHLORIDE 10 MG: 10 TABLET ORAL at 14:17

## 2023-01-30 RX ADMIN — HYDROMORPHONE HYDROCHLORIDE 0.5 MG: 1 INJECTION, SOLUTION INTRAMUSCULAR; INTRAVENOUS; SUBCUTANEOUS at 10:20

## 2023-01-30 RX ADMIN — HYDROMORPHONE HYDROCHLORIDE 0.5 MG: 1 INJECTION, SOLUTION INTRAMUSCULAR; INTRAVENOUS; SUBCUTANEOUS at 12:38

## 2023-01-30 RX ADMIN — MIDAZOLAM 2 MG: 1 INJECTION INTRAMUSCULAR; INTRAVENOUS at 09:54

## 2023-01-30 RX ADMIN — SUGAMMADEX 50 MG: 100 INJECTION, SOLUTION INTRAVENOUS at 11:28

## 2023-01-30 RX ADMIN — Medication 10 MG: at 10:22

## 2023-01-30 RX ADMIN — KETOROLAC TROMETHAMINE 15 MG: 30 INJECTION, SOLUTION INTRAMUSCULAR at 11:01

## 2023-01-30 RX ADMIN — DEXMEDETOMIDINE HYDROCHLORIDE 2 MCG: 100 INJECTION, SOLUTION INTRAVENOUS at 11:04

## 2023-01-30 RX ADMIN — KETOROLAC TROMETHAMINE 30 MG: 30 INJECTION, SOLUTION INTRAMUSCULAR at 17:00

## 2023-01-30 RX ADMIN — HYDROMORPHONE HYDROCHLORIDE 0.5 MG: 1 INJECTION, SOLUTION INTRAMUSCULAR; INTRAVENOUS; SUBCUTANEOUS at 16:26

## 2023-01-30 RX ADMIN — HYDROMORPHONE HYDROCHLORIDE 0.5 MG: 1 INJECTION, SOLUTION INTRAMUSCULAR; INTRAVENOUS; SUBCUTANEOUS at 13:21

## 2023-01-30 RX ADMIN — ROCURONIUM BROMIDE 35 MG: 10 INJECTION INTRAVENOUS at 10:07

## 2023-01-30 RX ADMIN — SODIUM CHLORIDE: 9 INJECTION, SOLUTION INTRAVENOUS at 11:41

## 2023-01-30 RX ADMIN — ROPINIROLE HYDROCHLORIDE 0.5 MG: 0.5 TABLET, FILM COATED ORAL at 20:57

## 2023-01-30 RX ADMIN — Medication 20 MG: at 10:00

## 2023-01-30 RX ADMIN — ROCURONIUM BROMIDE 10 MG: 10 INJECTION INTRAVENOUS at 11:03

## 2023-01-30 ASSESSMENT — PAIN DESCRIPTION - FREQUENCY
FREQUENCY: CONTINUOUS

## 2023-01-30 ASSESSMENT — PAIN DESCRIPTION - ORIENTATION
ORIENTATION: LOWER
ORIENTATION: RIGHT;LOWER
ORIENTATION: RIGHT
ORIENTATION: RIGHT;LOWER
ORIENTATION: RIGHT

## 2023-01-30 ASSESSMENT — PAIN SCALES - GENERAL
PAINLEVEL_OUTOF10: 10
PAINLEVEL_OUTOF10: 8
PAINLEVEL_OUTOF10: 5
PAINLEVEL_OUTOF10: 9
PAINLEVEL_OUTOF10: 8
PAINLEVEL_OUTOF10: 7
PAINLEVEL_OUTOF10: 9
PAINLEVEL_OUTOF10: 7
PAINLEVEL_OUTOF10: 9
PAINLEVEL_OUTOF10: 0

## 2023-01-30 ASSESSMENT — PAIN DESCRIPTION - DESCRIPTORS
DESCRIPTORS: SHARP;THROBBING
DESCRIPTORS: SHARP;SHOOTING
DESCRIPTORS: SHARP
DESCRIPTORS: THROBBING
DESCRIPTORS: THROBBING;SHARP
DESCRIPTORS: SHARP;SHOOTING
DESCRIPTORS: SHARP;THROBBING
DESCRIPTORS: THROBBING

## 2023-01-30 ASSESSMENT — PAIN DESCRIPTION - ONSET
ONSET: ON-GOING

## 2023-01-30 ASSESSMENT — PAIN DESCRIPTION - LOCATION
LOCATION: ABDOMEN
LOCATION: VAGINA;GROIN
LOCATION: ABDOMEN
LOCATION: VAGINA
LOCATION: ABDOMEN

## 2023-01-30 ASSESSMENT — PAIN DESCRIPTION - DIRECTION
RADIATING_TOWARDS: RIGHT SIDE

## 2023-01-30 ASSESSMENT — PAIN DESCRIPTION - PAIN TYPE
TYPE: SURGICAL PAIN

## 2023-01-30 ASSESSMENT — PAIN - FUNCTIONAL ASSESSMENT
PAIN_FUNCTIONAL_ASSESSMENT: ACTIVITIES ARE NOT PREVENTED
PAIN_FUNCTIONAL_ASSESSMENT: 0-10

## 2023-01-30 ASSESSMENT — LIFESTYLE VARIABLES: SMOKING_STATUS: 0

## 2023-01-30 NOTE — OP NOTE
Operative Note      Patient: Joshua Palacios  YOB: 1985  MRN: [id number]    Date of Procedure: 1/30/2023    Pre-Op Diagnosis: ALLERGIC CONTACT DERMATITIS RELATED TO SUBURETHRAL SLING MATERIAL    Post-Op Diagnosis: Same       Procedures: Procedure(s):  REMOVAL OF SUBURETHRAL SLING, VAGINAL EXPLORATION, ABDOMINAL EXPLORATION     Surgeon: Surgeon(s):  Greyson Lopez MD    Anesthesia: General    Assistant: Surgical Assistant: Helen Zaldivar; Asmita Nunez    Estimated Blood Loss (mL): 150 mL    IV FLUIDS: 1200 milliliter(s) In: 1250 [I.V.:1200]  Out: 250 [Urine:100]    URINE OUTPUT: 150 milliters(s)   Output by Drain (mL) 01/28/23 0701 - 01/28/23 1900 01/28/23 1901 - 01/29/23 0700 01/29/23 0701 - 01/29/23 1900 01/29/23 1901 - 01/30/23 0700 01/30/23 0701 - 01/30/23 1237   Requested LDAs do not have output data documented. Complications: None    Specimens:   ID Type Source Tests Collected by Time Destination   A :  Tissue Tissue SURGICAL PATHOLOGY Greyson Lopez MD 1/30/2023 1117        Implants:   Implant Name Type Inv.  Item Serial No.  Lot No. LRB No. Used Action   SYSTEM SLNG DYLAN SUPRPUB MID Teofilo Janus [id number]  SYSTEM SLNG Bessie Christopher MID [facility] [employer]- [id number] N/A 1 Explanted         Drains:   Urinary Catheter 01/30/23 Yepez (Active)   Catheter Indications Perioperative use for selected surgical procedures 01/30/23 1212   Site Assessment Pink 01/30/23 1212   Urine Color Yellow 01/30/23 1212   Urine Appearance Clear 01/30/23 1212   Collection Container Standard 01/30/23 1212   Securement Method Securing device (Describe) 01/30/23 1212   Catheter Best Practices  Lack of dependent loop in tubing;Drainage tube clipped to bed;Bag not on floor;Drainage bag less than half full 01/30/23 1212   Status Draining 01/30/23 1212       [REMOVED] NG/OG/NJ/NE Tube [facility] mouth (Removed)       FINDINGS: Suburethral sling removed in its entirety    INDICATION FOR PROCEDURE: 40y.o. year old patient who presented with stress incontinence initially. She underwent a suburethral sling procedure approximately 8 weeks ago. About 6 weeks out from the procedure she came in complaining of itching and had a rash on her back. She subsequently followed up with an allergist who tested her for an allergic reaction to the sling and it was positive. Because her symptoms were so dramatic she elected to have the entire sling material removed. Risk and benefits were explained to the patient at length including injury to bowel bladder ureter other internal organs and the recurrence of her stress incontinence. She understood that she would probably need further surgery for urinary leakage. OPERATIVE NOTE:   The patient was taken to the operating room and general anesthesia was found to be adequate. She was prepped and draped in the normal sterile fashion and placed in [address] [place] [address]. Preoperative antibiotics were administered in the patient holding area. Initially a Pfannenstiel incision was made with a knife and carried down to the layer of the fascia. The fascia was then nicked in the midline and the incision extended laterally by the means of Jimenez scissors. Kocher clamps were grasped superiorly on the fascia and the fascia was dissected off of the underlying muscle. Inferiorly the fascia was grasped and the muscle was dissected off of the fascia to the pubic bone. At this point I was able to identify the left suburethral sling coming through the muscle. I was able to put a right angle clamp around it and then follow it through the muscle and through the fascia. Carefully holding onto the sling I was able to dissect the fibrous tissue off of the sling and then bring it back out into the abdominal cavity. At this point I was able to free up the space of Retzius up and followed the sling all the way down to the urogenital diaphragm.   The exact same procedure was repeated on the patient's right side. Once this had been done attention was turned to the vagina    Allis clamps were placed at the vaginal epithelium and the  Position which was injected with 1% lidocaine with epinephrine. A midline incision was then made and the sling was easily identified and then followed back to the urogenital diaphragm. After to been freed up from the underlying tissue first on the patient's left side I was able to pull on the sling and then eventually released and came out. The exact same procedure was repeated on the patient's right side. The sling was then handed off to be sent to pathology. There was a small amount of bleeding on the left side tunnel vaginally and a 2-0 Vicryl suture interrupted was placed. 3-0 Vicryl suture was then used to close the vaginal epithelium in a running unlocked fashion. It was irrigated with gentamicin solution prior to this. Attention was then turned back to the abdomen    After our gloves and gowns were changed the abdomen was reinspected there was no bleeding noted. The space of Retzius was then irrigated with gentamicin solution. After this was done the fascia was then closed with 0 Vicryl suture in a running unlocked fashion. Several 3-0 Vicryl sutures were placed in the subcutaneous tissue. The skin was then closed with 4-0 Vicryl and Dermabond.           Electronically signed by Marielos Cherry MD on 1/30/2023 at 12:37 PM

## 2023-01-30 NOTE — PROGRESS NOTES
Pt now opening eyes, denies pain or nausea at this time. Updated on plan of care, given warm blanket.

## 2023-01-30 NOTE — ANESTHESIA POSTPROCEDURE EVALUATION
Department of Anesthesiology  Postprocedure Note    Patient: Sarthak Sanz  MRN: [id number]  YOB: 1985  Date of evaluation: 1/30/2023      Procedure Summary     Date: 01/30/23 Room / Location: [address]    Anesthesia Start: 1382 Anesthesia Stop: 8597    Procedure: REMOVAL OF SUBURETHRAL SLING, VAGINAL EXPLORATION, ABDOMINAL EXPLORATION (Abdomen) Diagnosis:       Allergic contact dermatitis of genitalia in female      (ALLERGIC CONTACT DERMATITIS RELATED TO SUBURETHRAL SLING MATERIAL)    Surgeons: Kathy Myers MD Responsible Provider: Colletta Smiles, MD    Anesthesia Type: general ASA Status: 2          Anesthesia Type: No value filed. Freeman Phase I: Freeman Score: 8    Freeman Phase II:        Anesthesia Post Evaluation    Patient location during evaluation: PACU  Patient participation: complete - patient participated  Level of consciousness: awake  Airway patency: patent  Nausea & Vomiting: no nausea and no vomiting  Cardiovascular status: blood pressure returned to baseline  Respiratory status: acceptable  Hydration status: stable  Comments: Vital signs stable  OK to discharge from Stage I post anesthesia care.   Care transferred from Anesthesiology department on discharge from perioperative area   Multimodal analgesia pain management approach

## 2023-01-30 NOTE — BRIEF OP NOTE
Brief Postoperative Note      Patient: Todd Fierro  YOB: 1985  MRN: [id number]    Date of Procedure: 1/30/2023    Pre-Op Diagnosis: ALLERGIC CONTACT DERMATITIS RELATED TO SUBURETHRAL SLING MATERIAL    Post-Op Diagnosis: Same       Procedure(s):  REMOVAL OF SUBURETHRAL SLING, VAGINAL EXPLORATION, ABDOMINAL EXPLORATION  REMOVAL OF SUBURETHRAL SLING, VAGINAL EXPLORATION    Surgeon(s):  Svetlana Vazquez MD    Assistant:  Surgical Assistant: Tayler Richardson; Lavinia Weston    Anesthesia: General    Estimated Blood Loss (mL): 126    Complications: None    Specimens:   ID Type Source Tests Collected by Time Destination   A :  Tissue Tissue SURGICAL PATHOLOGY Svetlana Vazquez MD 1/30/2023 1117        Implants:  Implant Name Type Inv.  Item Serial No.  Lot No. LRB No. Used Action   SYSTEM SLNG DYLAN SUPRPUB MID Aleene Ramal - [id number]  SYSTEM SLNG DYLAN SUPRPUB MID [employer]- [id number] N/A 1 Explanted         Drains:   Urinary Catheter 01/30/23 Yepez (Active)       [REMOVED] NG/OG/NJ/NE Tube [facility] mouth (Removed)       Findings: allergic reaction to synthetic sling mesh, sling removed in its entirety      Electronically signed by Tyson Lopez MD on 1/30/2023 at 11:37 AM

## 2023-01-30 NOTE — H&P
Date of Surgery Update:  Viridiana Morgan was seen, history and physical examination reviewed, and patient examined by me today. There have been no significant clinical changes since the completion of the previous history and physical. The surgical site was confirmed by the patient and me. I have presented reasonable alternatives to the patient's proposed care, treatment, and services. The discussion I have done encompassed risks, benefits, and side effects related to the alternatives and the risks related to not receiving the proposed care, treatment, and services. All questions answered. Patient wishes to proceed.      Electronically signed by: Brittany Carvalho MD,1/30/2023,9:34 AM

## 2023-01-30 NOTE — PROGRESS NOTES
Pt to pcu from OR. Pt asleep at arrival, on 4L via NC per CRNA. Pt placed on monitor, VSS. Yepez in place, drainage. Laceration to lower abdo clean dry and intact. Deya pad in place. No signs of distress noted at this time. Report obtained.

## 2023-01-30 NOTE — PROGRESS NOTES
Vaginal Sweep Documentation     Vaginal prep sponge count performed by Oanh Shen RN and Jennifer Barajas RN Count correct. Vaginal sweep performed by DR. Leticia Stock MD  at [id number]. No foreign objects or vaginal tears noted.

## 2023-01-30 NOTE — ANESTHESIA PRE PROCEDURE
Department of Anesthesiology  Preprocedure Note       Name:  Billy Russ   Age:  40 y.o.  :  1985                                          MRN:  [id number]         Date:  2023      Surgeon: Akanksha Best):  Paula Fisher MD    Procedure: Procedure(s):  REMOVAL OF SUBURETHRAL SLING, VAGINAL EXPLORATION, ABDOMINAL EXPLORATION    Medications prior to admission:   Prior to Admission medications    Medication Sig Start Date End Date Taking? Authorizing Provider   famotidine (PEPCID) 40 MG tablet Take 1 tablet by mouth daily 22   Radha Montoya MD   rOPINIRole (REQUIP) 0.5 MG tablet Take 1 tablet by mouth at bedtime 22   LINA Montano CNP   ibuprofen (ADVIL;MOTRIN) 600 MG tablet Take 1 tablet by mouth every 6 hours as needed for Pain  Patient not taking: No sig reported 22   LINA Saleem CNP       Current medications:    Current Facility-Administered Medications   Medication Dose Route Frequency Provider Last Rate Last Admin    sodium chloride flush 0.9 % injection 5-40 mL  5-40 mL IntraVENous 2 times per day Elicia Perdomo MD        sodium chloride flush 0.9 % injection 5-40 mL  5-40 mL IntraVENous PRN Elicia Perdomo MD        0.9 % sodium chloride infusion   IntraVENous PRN Elicia Perdomo MD        ceFAZolin (ANCEF) 2,000 mg in sodium chloride 0.9 % 50 mL IVPB (mini-bag)  2,000 mg IntraVENous On Call to Eloy Shirley MD           Allergies:     Allergies   Allergen Reactions    [place] Rash     PER ALLERGY TESTING    Nickel Rash     PER ALLERGY TESTING    Other Itching and Rash     MESH FROM SLING    Penicillins Hives       Problem List:    Patient Active Problem List   Diagnosis Code    GSI (genuine stress incontinence), female N39.3       Past Medical History:        Diagnosis Date    Anxiety     Depression     Hepatitis C     asymptomatic    IV drug abuse ([facility] [address]     CLEAN SINCE     Ovarian cyst     Restless leg syndrome     Urinary incontinence 2022    Wears glasses        Past Surgical History:        Procedure Laterality Date    CYSTOSCOPY N/A 2022    CYSTOSCOPY performed by Andrade Mcbride MD at [address]  02/10/2016    diagnostic laparoscopy, KTP laser of endometriosis     TUBAL LIGATION      URETHRAL SURGERY N/A 2022    RETROPUBIC SLING performed by Andrade Mcbride MD at [address] History:    Social History     Tobacco Use    Smoking status: Former     Packs/day: 0.25     Years: 20.00     Pack years: 5.00     Types: Cigarettes     Quit date: 12/15/2018     Years since quittin.1    Smokeless tobacco: Never    Tobacco comments:     Not smoking at all now   Substance Use Topics    Alcohol use: No                                Counseling given: Not Answered  Tobacco comments: Not smoking at all now      Vital Signs (Current):   Vitals:    23 1041 23 0802   BP:  109/73   Pulse:  71   Resp:  16   Temp:  97.7 °F (36.5 °C)   TempSrc:  Temporal   SpO2:  98%   Weight: 180 lb (81.6 kg) 184 lb 6.6 oz (83.7 kg)   Height: 5' 3\" (1.6 m) 5' 3\" (1.6 m)                                              BP Readings from Last 3 Encounters:   23 109/73   23 133/81   23 130/74       NPO Status: Time of last liquid consumption:                         Time of last solid consumption:                         Date of last liquid consumption: 23                        Date of last solid food consumption: 23    BMI:   Wt Readings from Last 3 Encounters:   23 184 lb 6.6 oz (83.7 kg)   22 178 lb 3.2 oz (80.8 kg)   22 169 lb 5 oz (76.8 kg)     Body mass index is 32.67 kg/m².     CBC:   Lab Results   Component Value Date/Time    WBC 4.2 2023 02:29 PM    RBC 4.84 2023 02:29 PM    HGB 13.7 2023 02:29 PM    HCT 41.3 2023 02:29 PM    MCV 85.3 2023 02:29 PM    RDW 14.5 2023 02:29 PM     01/05/2023 02:29 PM       CMP:   Lab Results   Component Value Date/Time     01/10/2023 11:35 AM    K 4.4 01/10/2023 11:35 AM    K 4.1 07/29/2021 10:31 AM     01/10/2023 11:35 AM    CO2 21 01/10/2023 11:35 AM    BUN 13 01/10/2023 11:35 AM    CREATININE 0.7 01/10/2023 11:35 AM    GFRAA >60 01/12/2022 10:14 AM    GFRAA >60 01/18/2012 11:36 PM    AGRATIO 1.3 01/10/2023 11:35 AM    LABGLOM >60 01/10/2023 11:35 AM    GLUCOSE 100 01/10/2023 11:35 AM    PROT 7.7 01/10/2023 11:35 AM    CALCIUM 9.5 01/10/2023 11:35 AM    BILITOT 0.7 01/10/2023 11:35 AM    ALKPHOS 147 01/10/2023 11:35 AM     01/10/2023 11:35 AM     01/10/2023 11:35 AM       POC Tests: No results for input(s): POCGLU, POCNA, POCK, POCCL, POCBUN, POCHEMO, POCHCT in the last 72 hours.     Coags: No results found for: PROTIME, INR, APTT    HCG (If Applicable):   Lab Results   Component Value Date    PREGTESTUR Negative 01/30/2023        ABGs: No results found for: PHART, PO2ART, MYW8CVV, CCM4RWY, BEART, Z6IZIARJ     Type & Screen (If Applicable):  No results found for: LABABO, LABRH    Drug/Infectious Status (If Applicable):  No results found for: HIV, HEPCAB    COVID-19 Screening (If Applicable):   Lab Results   Component Value Date/Time    COVID19 DETECTED 01/05/2022 10:39 PM           Anesthesia Evaluation  Patient summary reviewed no history of anesthetic complications:   Airway: Mallampati: II  TM distance: >3 FB   Neck ROM: full  Mouth opening: > = 3 FB   Dental: normal exam         Pulmonary:Negative Pulmonary ROS breath sounds clear to auscultation      (-) not a current smoker (former 5 pack year smoker)                           Cardiovascular:Negative CV ROS  Exercise tolerance: good (>4 METS),       (-) hypertension, past MI, CAD and  SNELL      Rhythm: regular  Rate: normal                    Neuro/Psych:   (+) depression/anxiety    (-) psychiatric history            ROS comment: Remote h/o IVDU GI/Hepatic/Renal:   (+) hepatitis: C,      (-) hiatal hernia, GERD, PUD, liver disease, no renal disease, bowel prep and no morbid obesity       Endo/Other: Negative Endo/Other ROS                    Abdominal:             Vascular: negative vascular ROS. Other Findings:             Anesthesia Plan      general     ASA 2     (42 yo F with PMHx of remote IVDU, HCV, and depression presents for removal of suburethral sling, vaginal and abdominal exploration. I discussed with the patient the risks and benefits to general anesthesia including possible anesthetic complications but not limited to: PONV, damage to the airway and surrounding structures (teeth, lips, gums, tongue, etc.), adverse reactions to medications, cardiac complications (MI, CHF, arrhythmias, etc.), respiratory complications (post-op ventilation, respiratory failure, etc.), neurologic complications (nerve damage, stroke, seizure) and death. The patient was given the opportunity to ask questions and all questions were answered to the patient's satisfaction.  )  Induction: intravenous. MIPS: Postoperative opioids intended, Prophylactic antiemetics administered and Postoperative trial extubation. Anesthetic plan and risks discussed with patient. Plan discussed with CRNA. Colletta Smiles, MD   1/30/2023      This pre-anesthesia assessment may be used as a history and physical.    DOS STAFF ADDENDUM:    Pt seen and examined, chart reviewed (including anesthesia, drug and allergy history). No interval changes to history and physical examination. Anesthetic plan, risks, benefits, alternatives, and personnel involved discussed with patient. Patient verbalized an understanding and agrees to proceed.       Colletta Smiles, MD  January 30, 2023  8:41 AM

## 2023-01-31 ENCOUNTER — TELEPHONE (OUTPATIENT)
Dept: UROGYNECOLOGY | Age: 38
End: 2023-01-31

## 2023-01-31 VITALS
RESPIRATION RATE: 12 BRPM | DIASTOLIC BLOOD PRESSURE: 64 MMHG | WEIGHT: 184.41 LBS | TEMPERATURE: 98.1 F | HEART RATE: 88 BPM | BODY MASS INDEX: 32.68 KG/M2 | SYSTOLIC BLOOD PRESSURE: 111 MMHG | HEIGHT: 63 IN | OXYGEN SATURATION: 100 %

## 2023-01-31 PROBLEM — B18.2 CHRONIC HEPATITIS C WITHOUT HEPATIC COMA (HCC): Status: ACTIVE | Noted: 2017-09-21

## 2023-01-31 PROBLEM — R82.90 ABNORMAL FINDING ON URINALYSIS: Status: ACTIVE | Noted: 2017-09-21

## 2023-01-31 PROBLEM — F31.5 BIPOLAR I DISORDER, MOST RECENT EPISODE (OR CURRENT) DEPRESSED, SEVERE, SPECIFIED AS WITH PSYCHOTIC BEHAVIOR (HCC): Status: ACTIVE | Noted: 2017-09-20

## 2023-01-31 PROBLEM — T78.40XA ALLERGIC REACTION TO SUBSTANCE: Status: ACTIVE | Noted: 2023-01-31

## 2023-01-31 PROBLEM — T78.40XA ALLERGIC REACTION TO SUBSTANCE: Status: RESOLVED | Noted: 2023-01-30 | Resolved: 2023-01-31

## 2023-01-31 PROBLEM — F11.11 HISTORY OF HEROIN ABUSE (HCC): Status: ACTIVE | Noted: 2017-09-22

## 2023-01-31 PROCEDURE — 1200000000 HC SEMI PRIVATE

## 2023-01-31 PROCEDURE — 6370000000 HC RX 637 (ALT 250 FOR IP): Performed by: NURSE PRACTITIONER

## 2023-01-31 PROCEDURE — 2580000003 HC RX 258: Performed by: OBSTETRICS & GYNECOLOGY

## 2023-01-31 PROCEDURE — 6370000000 HC RX 637 (ALT 250 FOR IP): Performed by: OBSTETRICS & GYNECOLOGY

## 2023-01-31 PROCEDURE — G0378 HOSPITAL OBSERVATION PER HR: HCPCS

## 2023-01-31 PROCEDURE — 6360000002 HC RX W HCPCS: Performed by: OBSTETRICS & GYNECOLOGY

## 2023-01-31 RX ORDER — IBUPROFEN 600 MG/1
600 TABLET ORAL EVERY 6 HOURS PRN
Qty: 56 TABLET | Refills: 1 | Status: ON HOLD | OUTPATIENT
Start: 2023-01-31 | End: 2023-02-14

## 2023-01-31 RX ORDER — HYDROXYZINE HYDROCHLORIDE 10 MG/1
10 TABLET, FILM COATED ORAL 3 TIMES DAILY PRN
Qty: 9 TABLET | Refills: 0 | Status: ON HOLD | OUTPATIENT
Start: 2023-01-31 | End: 2023-02-03

## 2023-01-31 RX ORDER — HYDROXYZINE HYDROCHLORIDE 10 MG/1
10 TABLET, FILM COATED ORAL 3 TIMES DAILY PRN
Status: DISCONTINUED | OUTPATIENT
Start: 2023-01-31 | End: 2023-01-31 | Stop reason: HOSPADM

## 2023-01-31 RX ORDER — OXYCODONE HYDROCHLORIDE 5 MG/1
5 TABLET ORAL EVERY 6 HOURS PRN
Qty: 20 TABLET | Refills: 0 | Status: ON HOLD | OUTPATIENT
Start: 2023-01-31 | End: 2023-02-05

## 2023-01-31 RX ADMIN — OXYCODONE HYDROCHLORIDE 10 MG: 10 TABLET ORAL at 07:55

## 2023-01-31 RX ADMIN — OXYCODONE HYDROCHLORIDE 10 MG: 10 TABLET ORAL at 04:21

## 2023-01-31 RX ADMIN — HYDROXYZINE HYDROCHLORIDE 10 MG: 10 TABLET, FILM COATED ORAL at 09:05

## 2023-01-31 RX ADMIN — KETOROLAC TROMETHAMINE 30 MG: 30 INJECTION, SOLUTION INTRAMUSCULAR at 04:20

## 2023-01-31 RX ADMIN — OXYCODONE HYDROCHLORIDE 10 MG: 10 TABLET ORAL at 13:39

## 2023-01-31 RX ADMIN — SODIUM CHLORIDE: 9 INJECTION, SOLUTION INTRAVENOUS at 04:11

## 2023-01-31 RX ADMIN — KETOROLAC TROMETHAMINE 30 MG: 30 INJECTION, SOLUTION INTRAMUSCULAR at 10:27

## 2023-01-31 RX ADMIN — HYDROMORPHONE HYDROCHLORIDE 0.5 MG: 1 INJECTION, SOLUTION INTRAMUSCULAR; INTRAVENOUS; SUBCUTANEOUS at 02:49

## 2023-01-31 ASSESSMENT — PAIN DESCRIPTION - DESCRIPTORS
DESCRIPTORS: SHARP;SHOOTING
DESCRIPTORS: ACHING;SHARP
DESCRIPTORS: ACHING;CRAMPING
DESCRIPTORS: ACHING;SHARP
DESCRIPTORS: SHARP;SHOOTING
DESCRIPTORS: ACHING;SHARP
DESCRIPTORS: STABBING;SHARP

## 2023-01-31 ASSESSMENT — PAIN DESCRIPTION - ORIENTATION
ORIENTATION: LOWER
ORIENTATION: RIGHT;MID;LOWER
ORIENTATION: RIGHT;MID;LOWER
ORIENTATION: LOWER
ORIENTATION: MID
ORIENTATION: LOWER
ORIENTATION: RIGHT;MID;LOWER

## 2023-01-31 ASSESSMENT — PAIN DESCRIPTION - PAIN TYPE
TYPE: SURGICAL PAIN

## 2023-01-31 ASSESSMENT — PAIN DESCRIPTION - LOCATION
LOCATION: ABDOMEN
LOCATION: INCISION

## 2023-01-31 ASSESSMENT — PAIN DESCRIPTION - ONSET
ONSET: ON-GOING
ONSET: ON-GOING
ONSET: GRADUAL
ONSET: AWAKENED FROM SLEEP

## 2023-01-31 ASSESSMENT — PAIN - FUNCTIONAL ASSESSMENT
PAIN_FUNCTIONAL_ASSESSMENT: ACTIVITIES ARE NOT PREVENTED

## 2023-01-31 ASSESSMENT — PAIN DESCRIPTION - FREQUENCY
FREQUENCY: INTERMITTENT
FREQUENCY: CONTINUOUS
FREQUENCY: CONTINUOUS
FREQUENCY: INTERMITTENT
FREQUENCY: CONTINUOUS

## 2023-01-31 ASSESSMENT — PAIN SCALES - GENERAL
PAINLEVEL_OUTOF10: 9
PAINLEVEL_OUTOF10: 8
PAINLEVEL_OUTOF10: 10
PAINLEVEL_OUTOF10: 8
PAINLEVEL_OUTOF10: 8
PAINLEVEL_OUTOF10: 6
PAINLEVEL_OUTOF10: 8

## 2023-01-31 ASSESSMENT — PAIN DESCRIPTION - DIRECTION: RADIATING_TOWARDS: RIGHT SIDE

## 2023-01-31 NOTE — FLOWSHEET NOTE
Voiding trial began @ 0845. Bladder was filled with 300 ml of sterile water via sanchez catheter and tomey syringe, then catheter was removed. Patient assisted up to bathroom immediately following completion and tolerated well. Patient was able to void at this time. Will begin discharge teaching.

## 2023-01-31 NOTE — PLAN OF CARE
Pt resting quietly in bed. Currently pt does not want any further interventions for pain,. Will continue to monitor pt status.         Problem: Discharge Planning  Goal: Discharge to home or other facility with appropriate resources  1/31/2023 0138 by Janis Andrade RN  Outcome: Progressing  Flowsheets (Taken 1/30/2023 1945)  Discharge to home or other facility with appropriate resources:   Identify barriers to discharge with patient and caregiver   Arrange for needed discharge resources and transportation as appropriate   Identify discharge learning needs (meds, wound care, etc)     Problem: Safety - Adult  Goal: Free from fall injury  1/31/2023 0138 by Janis Andrade RN  Outcome: Progressing     Problem: Pain  Goal: Verbalizes/displays adequate comfort level or baseline comfort level  Outcome: Progressing  Flowsheets  Taken 1/30/2023 2118  Verbalizes/displays adequate comfort level or baseline comfort level:   Encourage patient to monitor pain and request assistance   Assess pain using appropriate pain scale   Administer analgesics based on type and severity of pain and evaluate response   Implement non-pharmacological measures as appropriate and evaluate response  Taken 1/30/2023 1945  Verbalizes/displays adequate comfort level or baseline comfort level:   Encourage patient to monitor pain and request assistance   Assess pain using appropriate pain scale   Administer analgesics based on type and severity of pain and evaluate response   Implement non-pharmacological measures as appropriate and evaluate response   Consider cultural and social influences on pain and pain management   Notify Licensed Independent Practitioner if interventions unsuccessful or patient reports new pain

## 2023-01-31 NOTE — FLOWSHEET NOTE
Discharge teaching completed and forms signed by patient. Copy witnessed by RN and given to patient. Prescriptions given. Patient plans to follow-up with Provider as instructed. Patient verbalizes understanding of discharge instructions and denies further questions. Patient discharged in stable condition accompanied by significant other to private vehicle.

## 2023-01-31 NOTE — DISCHARGE INSTRUCTIONS
[facility]  [address] [place] [address]   [phone]    Dr. Cheryl Qiu MD  [address]  [address] [facility] [address]  Phone [phone]  Fax: [phone]      PATIENT NAME: Nancy RECORD NUMBER:  [id number]  TODAY'S DATE: 1/31/2023       Discharge Instructions Minor: Procedure(s):  REMOVAL OF SUBURETHRAL SLING, VAGINAL EXPLORATION, ABDOMINAL EXPLORATION: 26561 (CPT®)      Post-operative instructions  WHAT TO EXPECT AFTER THE PROCEDURE:   Diet You may return to your normal diet after surgery. Mild nausea and possibly vomiting may occur in the first 6-8 hours following surgery. This is usually due to side effects of anesthesia and will resolve. We suggest clear liquids and a light meal the evening following surgery. Activity You will be limited to light activity for 2 weeks. You may not engage in sexual intercourse, use tampons, lift  greater than 10 lbs. , jump, squat, or ride straddle (bike, motorcycle, etc.) for 4-6 weeks. Voiding You may notice some mild burning with urination after surgery due to the catheter that is placed during surgery. This should resolve in 1-2 days. You also may notice a slower stream or mild difficulty voiding. This can be secondary to the swelling and usually improves within a week. If at any point you cannot void for 6 straight hours, contact our office. You may come home with a urinary catheter (should not be attached to a bag) you will come into the office 1 week later to attempt a voiding trial and see if its ready to be removed. Please contact our office if you have any of the following symptoms:  Discharge that has a foul odor or is green in color. Soaking a pad every 2 hours, continuing to bleed longer than 1 week or have a sudden increase in your bleeding. Develop a fever of 100.4 or higher. Hygiene You may resume normal showering immediately after surgery.  Avoid baths and swimming for 4 weeks after surgery.  No lotions or creams on incisions, unless directed by provider    Medications In most cases, you will be sent home with a prescription pain medication. If the pain medication you are sent home with does not control the pain when being used as directed, call our office. Typically, discomfort lasts from a few days to two weeks, but it should progressively improve. While taking prescription pain medication, it is recommended you also take a stool softener such as Docusate Sodium (Colace, Dulcolax) to counteract the constipating side effects of the pain medication. If the pain is mild, you may take over-the-counter Tylenol (acetaminophen) or a Non-Steroidal Anti-Inflammatories (NSAIDs) such as Aspirin, Motrin or Advil (Ibuprofen), Aleve (Naproxen).    Post-Op Visits You will be scheduled to see the Nurse Practitioner approximately 2 weeks after your surgery.        FOLLOW UP APPOINTMENTS  Upon leaving the hospital, you should call [phone] during normal business hours to schedule follow-up appointments. You will likely need to be seen for the following:  Catheter removal in one week, if you go home with a bladder catheter (OR you may be instructed to remove your catheter at home in one week)   Two-week follow-up   Six-week follow-up

## 2023-01-31 NOTE — PLAN OF CARE
Problem: Discharge Planning  Goal: Discharge to home or other facility with appropriate resources  1/31/2023 1103 by Maye Wynne RN  Outcome: Progressing  Flowsheets (Taken 1/31/2023 0830)  Discharge to home or other facility with appropriate resources:   Identify barriers to discharge with patient and caregiver   Arrange for needed discharge resources and transportation as appropriate   Identify discharge learning needs (meds, wound care, etc)  1/31/2023 0138 by Saumya Euceda RN  Outcome: Progressing  Flowsheets (Taken 1/30/2023 1945)  Discharge to home or other facility with appropriate resources:   Identify barriers to discharge with patient and caregiver   Arrange for needed discharge resources and transportation as appropriate   Identify discharge learning needs (meds, wound care, etc)     Problem: Safety - Adult  Goal: Free from fall injury  1/31/2023 1103 by Maye Wynne RN  Outcome: Progressing  1/31/2023 0138 by Saumya Euceda RN  Outcome: Progressing     Problem: Pain  Goal: Verbalizes/displays adequate comfort level or baseline comfort level  1/31/2023 1103 by Maye Wynne RN  Outcome: Progressing  [facility] (Taken 1/31/2023 0405 by Saumya Euceda RN)  Verbalizes/displays adequate comfort level or baseline comfort level:   Encourage patient to monitor pain and request assistance   Administer analgesics based on type and severity of pain and evaluate response   Consider cultural and social influences on pain and pain management   Assess pain using appropriate pain scale   Implement non-pharmacological measures as appropriate and evaluate response   Notify Licensed Independent Practitioner if interventions unsuccessful or patient reports new pain  1/31/2023 0138 by Saumya Euceda RN  Outcome: Progressing  Flowsheets  Taken 1/30/2023 2118  Verbalizes/displays adequate comfort level or baseline comfort level:   Encourage patient to monitor pain and request assistance   Assess pain using appropriate pain scale   Administer analgesics based on type and severity of pain and evaluate response   Implement non-pharmacological measures as appropriate and evaluate response  Taken 1/30/2023 1945  Verbalizes/displays adequate comfort level or baseline comfort level:   Encourage patient to monitor pain and request assistance   Assess pain using appropriate pain scale   Administer analgesics based on type and severity of pain and evaluate response   Implement non-pharmacological measures as appropriate and evaluate response   Consider cultural and social influences on pain and pain management   Notify Licensed Independent Practitioner if interventions unsuccessful or patient reports new pain

## 2023-01-31 NOTE — PROGRESS NOTES
CLINICAL PHARMACY NOTE: MEDS TO BEDS    Total # of Prescriptions Filled: 3   The following medications were delivered to the patient:  Current Discharge Medication List        START taking these medications    Details   hydrOXYzine HCl (ATARAX) 10 MG tablet Take 1 tablet by mouth 3 times daily as needed for Itching  Qty: 9 tablet, Refills: 0      oxyCODONE (ROXICODONE) 5 MG immediate release tablet Take 1 tablet by mouth every 6 hours as needed for Pain for up to 5 days. Intended supply: 5 days.  Take lowest dose possible to manage pain Max Daily Amount: 20 mg  Qty: 20 tablet, Refills: 0    Comments: Reduce doses taken as pain becomes manageable  Associated Diagnoses: Post-op pain         Ibuprofen 600mg tabs      Additional Documentation:

## 2023-01-31 NOTE — DISCHARGE SUMMARY
Hospital Discharge Summary      Patient ID: Rosanna Gimenez      Patient's PCP: LINA Last CNP    Admit Date: 2023     Discharge Date: 2023  The patient was seen and examined on day of discharge and this discharge summary is in conjunction with any daily progress note from day of discharge. Admitting Physician: Jesse Nevarez MD    Discharge Physician: LINA Russo CNP     Admitted for No chief complaint on file. Admitting Diagnosis Allergic contact dermatitis of genitalia in female [L23.9]  Allergic reaction to substance [T78.40XA]    Discharge Diagnoses: There are no active hospital problems to display for this patient. Hospital Course:   POD 1  VSS  Incisions are intact, slight bruising. Tolerating regular diet  Awaiting voiding trial  Tolerating scheduled medications. Feels her pain is not well managed yet. Expectations for post op pain reviewed        Consults:     None        Disposition: home    Discharged Condition: Stable    Code Status: Prior    Activity: no lifting or Strenuous exercise for 2 weeks    Diet: regular diet      Wound Care: keep wound clean and dry    SUBJECTIVE / Interval History:   POD 1  Hives remain bothersome and she will be sent home with Atarax. Expectations for post op recover and pain management reviewed. She is tolerating regular diet  Awaiting voiding trial      Exam:  TEMPERATURE:  Current - Temp: 98.4 °F (36.9 °C);  Max - Temp  Av.1 °F (36.7 °C)  Min: 96.8 °F (36 °C)  Max: 98.6 °F (37 °C)  RESPIRATIONS RANGE: Resp  Av  Min: 9  Max: 18  PULSE RANGE: Pulse  Av.7  Min: 65  Max: 98  BLOOD PRESSURE RANGE:  Systolic (31MKJ), EKF:015 , Min:88 , NYD:314   ; Diastolic (51EHW), AYK:85, Min:55, Max:82    PULSE OXIMETRY RANGE: SpO2  Av.4 %  Min: 93 %  Max: 98 %  24HR INTAKE/OUTPUT:    Intake/Output Summary (Last 24 hours) at 2023 4470  Last data filed at 2023 6095  Gross per 24 hour   Intake 3407.5 ml   Output 1950 ml   Net 1457.5 ml      Wt Readings from Last 1 Encounters:   01/30/23 184 lb 6.6 oz (83.7 kg)     BMI Readings from Last 1 Encounters:   01/30/23 32.67 kg/m²         General appearance: No apparent distress, appears stated age and cooperative. HEENT Normal cephalic Conjunctivae/corneas clear. Neck: Supple, No jugular venous distention/bruits. Trachea midline without thyromegaly or adenopathy with full range of motion. Lungs:  good respiratory effort. Heart: Regular rate and rhythm   Abdomen: Soft, non-tender or non-distended without rigidity or guarding   Extremities: No clubbing, cyanosis, or edema bilaterally. Full range of motion without deformity and normal gait intact. Skin: Skin color, texture, turgor normal.  No rashes or lesions. Neurologic: Alert and oriented X 3  Mental status: Alert, oriented, thought content appropriate      Labs:  For convenience and continuity at follow-up the following most recent labs are provided:    CBC:   Lab Results   Component Value Date/Time    WBC 4.2 01/05/2023 02:29 PM    HGB 13.7 01/05/2023 02:29 PM    HCT 41.3 01/05/2023 02:29 PM     01/05/2023 02:29 PM       RENAL:   Lab Results   Component Value Date/Time     01/10/2023 11:35 AM    K 4.4 01/10/2023 11:35 AM    K 4.1 07/29/2021 10:31 AM     01/10/2023 11:35 AM    CO2 21 01/10/2023 11:35 AM    BUN 13 01/10/2023 11:35 AM    CREATININE 0.7 01/10/2023 11:35 AM           Discharge Medications:      Medication List        START taking these medications      hydrOXYzine HCl 10 MG tablet  Commonly known as: ATARAX  Take 1 tablet by mouth 3 times daily as needed for Itching            CONTINUE taking these medications      famotidine 40 MG tablet  Commonly known as: PEPCID  Take 1 tablet by mouth daily     ibuprofen 600 MG tablet  Commonly known as: ADVIL;MOTRIN  Take 1 tablet by mouth every 6 hours as needed for Pain     rOPINIRole 0.5 MG tablet  Commonly known as: Requip  Take 1 tablet by mouth at bedtime               Where to Get Your Medications        These medications were sent to [address] [place] [address] , [address] [place] [address] & [facility]  [address], [address] [facility]      Phone: [phone]   hydrOXYzine HCl 10 MG tablet  ibuprofen 600 MG tablet         Future Appointments   Date Time Provider Alicia Diaz   2/7/2023 10:00 AM LINA Aggarwal CNP [place] [facility]      No follow-ups on file. Time Spent on discharge is more than 30 minutes in the examination, evaluation, counseling and review of medications and discharge plan. Signed:  LINA Aggarwal CNP   1/31/2023    The note was completed using EMR. Every effort was made to ensure accuracy; however, inadvertent computerized transcription errors may be present.

## 2023-01-31 NOTE — TELEPHONE ENCOUNTER
Patient in hospital @ [facility], she is waiting on [facility] to bring her a letter. Will this be done today? Patient will have no way of getting it if CD does not bring it to her today in her hospital room.  Thank you

## 2023-02-01 ENCOUNTER — CARE COORDINATION (OUTPATIENT)
Dept: OTHER | Facility: CLINIC | Age: 38
End: 2023-02-01

## 2023-02-01 ENCOUNTER — APPOINTMENT (OUTPATIENT)
Dept: CT IMAGING | Age: 38
DRG: 948 | End: 2023-02-01
Payer: COMMERCIAL

## 2023-02-01 ENCOUNTER — APPOINTMENT (OUTPATIENT)
Dept: ULTRASOUND IMAGING | Age: 38
DRG: 948 | End: 2023-02-01
Payer: COMMERCIAL

## 2023-02-01 ENCOUNTER — HOSPITAL ENCOUNTER (INPATIENT)
Age: 38
LOS: 6 days | Discharge: HOME HEALTH | DRG: 948 | End: 2023-02-07
Attending: EMERGENCY MEDICINE | Admitting: INTERNAL MEDICINE
Payer: COMMERCIAL

## 2023-02-01 DIAGNOSIS — K81.0 ACUTE CHOLECYSTITIS: Primary | ICD-10-CM

## 2023-02-01 DIAGNOSIS — R79.89 ELEVATED LFTS: ICD-10-CM

## 2023-02-01 DIAGNOSIS — L76.32 POSTOPERATIVE HEMATOMA OF SUBCUTANEOUS TISSUE FOLLOWING NON-DERMATOLOGIC PROCEDURE: ICD-10-CM

## 2023-02-01 PROBLEM — D69.6 THROMBOCYTOPENIA (HCC): Status: ACTIVE | Noted: 2023-02-01

## 2023-02-01 PROBLEM — K81.9 CHOLECYSTITIS: Status: ACTIVE | Noted: 2023-02-01

## 2023-02-01 PROBLEM — E83.42 HYPOMAGNESEMIA: Status: ACTIVE | Noted: 2023-02-01

## 2023-02-01 PROBLEM — R10.9 ABDOMINAL PAIN: Status: ACTIVE | Noted: 2023-02-01

## 2023-02-01 LAB
A/G RATIO: 1 (ref 1.1–2.2)
ALBUMIN SERPL-MCNC: 3.3 G/DL (ref 3.4–5)
ALP BLD-CCNC: 116 U/L (ref 40–129)
ALT SERPL-CCNC: 168 U/L (ref 10–40)
AMORPHOUS: PRESENT
ANION GAP SERPL CALCULATED.3IONS-SCNC: 10 MMOL/L (ref 3–16)
AST SERPL-CCNC: 111 U/L (ref 15–37)
BACTERIA: ABNORMAL /HPF
BASOPHILS ABSOLUTE: 0 K/UL (ref 0–0.2)
BASOPHILS RELATIVE PERCENT: 0.2 %
BILIRUB SERPL-MCNC: 0.3 MG/DL (ref 0–1)
BILIRUBIN URINE: NEGATIVE
BLOOD, URINE: ABNORMAL
BUN BLDV-MCNC: 12 MG/DL (ref 7–20)
CALCIUM SERPL-MCNC: 8.9 MG/DL (ref 8.3–10.6)
CHLORIDE BLD-SCNC: 104 MMOL/L (ref 99–110)
CLARITY: ABNORMAL
CO2: 21 MMOL/L (ref 21–32)
COLOR: YELLOW
CREAT SERPL-MCNC: 0.7 MG/DL (ref 0.6–1.1)
EOSINOPHILS ABSOLUTE: 0.2 K/UL (ref 0–0.6)
EOSINOPHILS RELATIVE PERCENT: 3.5 %
EPITHELIAL CELLS, UA: 16 /HPF (ref 0–5)
GFR SERPL CREATININE-BSD FRML MDRD: >60 ML/MIN/{1.73_M2}
GLUCOSE BLD-MCNC: 95 MG/DL (ref 70–99)
GLUCOSE URINE: NEGATIVE MG/DL
HCG(URINE) PREGNANCY TEST: NEGATIVE
HCT VFR BLD CALC: 36.8 % (ref 36–48)
HEMOGLOBIN: 11.9 G/DL (ref 12–16)
HYALINE CASTS: 1 /LPF (ref 0–8)
KETONES, URINE: NEGATIVE MG/DL
LACTIC ACID, SEPSIS: 1.4 MMOL/L (ref 0.4–1.9)
LEUKOCYTE ESTERASE, URINE: ABNORMAL
LYMPHOCYTES ABSOLUTE: 1.8 K/UL (ref 1–5.1)
LYMPHOCYTES RELATIVE PERCENT: 38 %
MAGNESIUM: 1.5 MG/DL (ref 1.8–2.4)
MCH RBC QN AUTO: 28 PG (ref 26–34)
MCHC RBC AUTO-ENTMCNC: 32.4 G/DL (ref 31–36)
MCV RBC AUTO: 86.4 FL (ref 80–100)
MICROSCOPIC EXAMINATION: YES
MONOCYTES ABSOLUTE: 0.3 K/UL (ref 0–1.3)
MONOCYTES RELATIVE PERCENT: 6.4 %
MUCUS: PRESENT
NEUTROPHILS ABSOLUTE: 2.5 K/UL (ref 1.7–7.7)
NEUTROPHILS RELATIVE PERCENT: 51.9 %
NITRITE, URINE: NEGATIVE
PDW BLD-RTO: 14.3 % (ref 12.4–15.4)
PH UA: 5.5 (ref 5–8)
PLATELET # BLD: 109 K/UL (ref 135–450)
PMV BLD AUTO: 8.1 FL (ref 5–10.5)
POTASSIUM REFLEX MAGNESIUM: 3.4 MMOL/L (ref 3.5–5.1)
PRO-BNP: 689 PG/ML (ref 0–124)
PROTEIN UA: 30 MG/DL
RBC # BLD: 4.26 M/UL (ref 4–5.2)
RBC UA: 32 /HPF (ref 0–4)
SODIUM BLD-SCNC: 135 MMOL/L (ref 136–145)
SPECIFIC GRAVITY UA: 1.02 (ref 1–1.03)
TOTAL PROTEIN: 6.7 G/DL (ref 6.4–8.2)
TROPONIN: <0.01 NG/ML
URINE REFLEX TO CULTURE: YES
URINE TYPE: ABNORMAL
UROBILINOGEN, URINE: 1 E.U./DL
WBC # BLD: 4.8 K/UL (ref 4–11)
WBC UA: 42 /HPF (ref 0–5)

## 2023-02-01 PROCEDURE — 2580000003 HC RX 258: Performed by: PHYSICIAN ASSISTANT

## 2023-02-01 PROCEDURE — 1200000000 HC SEMI PRIVATE

## 2023-02-01 PROCEDURE — 83880 ASSAY OF NATRIURETIC PEPTIDE: CPT

## 2023-02-01 PROCEDURE — 6360000004 HC RX CONTRAST MEDICATION: Performed by: PHYSICIAN ASSISTANT

## 2023-02-01 PROCEDURE — 84484 ASSAY OF TROPONIN QUANT: CPT

## 2023-02-01 PROCEDURE — 6360000002 HC RX W HCPCS: Performed by: PHYSICIAN ASSISTANT

## 2023-02-01 PROCEDURE — 96374 THER/PROPH/DIAG INJ IV PUSH: CPT

## 2023-02-01 PROCEDURE — 71260 CT THORAX DX C+: CPT | Performed by: PHYSICIAN ASSISTANT

## 2023-02-01 PROCEDURE — 74177 CT ABD & PELVIS W/CONTRAST: CPT

## 2023-02-01 PROCEDURE — 85025 COMPLETE CBC W/AUTO DIFF WBC: CPT

## 2023-02-01 PROCEDURE — 2500000003 HC RX 250 WO HCPCS: Performed by: PHYSICIAN ASSISTANT

## 2023-02-01 PROCEDURE — 36415 COLL VENOUS BLD VENIPUNCTURE: CPT

## 2023-02-01 PROCEDURE — 80053 COMPREHEN METABOLIC PANEL: CPT

## 2023-02-01 PROCEDURE — 96361 HYDRATE IV INFUSION ADD-ON: CPT

## 2023-02-01 PROCEDURE — 84703 CHORIONIC GONADOTROPIN ASSAY: CPT

## 2023-02-01 PROCEDURE — 83605 ASSAY OF LACTIC ACID: CPT

## 2023-02-01 PROCEDURE — 81001 URINALYSIS AUTO W/SCOPE: CPT

## 2023-02-01 PROCEDURE — 96375 TX/PRO/DX INJ NEW DRUG ADDON: CPT

## 2023-02-01 PROCEDURE — 83735 ASSAY OF MAGNESIUM: CPT

## 2023-02-01 PROCEDURE — 76705 ECHO EXAM OF ABDOMEN: CPT

## 2023-02-01 PROCEDURE — 87086 URINE CULTURE/COLONY COUNT: CPT

## 2023-02-01 PROCEDURE — 99285 EMERGENCY DEPT VISIT HI MDM: CPT

## 2023-02-01 RX ORDER — SODIUM CHLORIDE 0.9 % (FLUSH) 0.9 %
5-40 SYRINGE (ML) INJECTION PRN
Status: DISCONTINUED | OUTPATIENT
Start: 2023-02-01 | End: 2023-02-07 | Stop reason: HOSPADM

## 2023-02-01 RX ORDER — MAGNESIUM SULFATE IN WATER 40 MG/ML
2000 INJECTION, SOLUTION INTRAVENOUS PRN
Status: DISCONTINUED | OUTPATIENT
Start: 2023-02-01 | End: 2023-02-07 | Stop reason: HOSPADM

## 2023-02-01 RX ORDER — CIPROFLOXACIN 2 MG/ML
400 INJECTION, SOLUTION INTRAVENOUS ONCE
Status: DISCONTINUED | OUTPATIENT
Start: 2023-02-01 | End: 2023-02-01

## 2023-02-01 RX ORDER — ONDANSETRON 2 MG/ML
4 INJECTION INTRAMUSCULAR; INTRAVENOUS EVERY 6 HOURS PRN
Status: DISCONTINUED | OUTPATIENT
Start: 2023-02-01 | End: 2023-02-07 | Stop reason: HOSPADM

## 2023-02-01 RX ORDER — DIPHENHYDRAMINE HYDROCHLORIDE 50 MG/ML
12.5 INJECTION INTRAMUSCULAR; INTRAVENOUS EVERY 6 HOURS PRN
Status: DISCONTINUED | OUTPATIENT
Start: 2023-02-01 | End: 2023-02-07 | Stop reason: HOSPADM

## 2023-02-01 RX ORDER — METRONIDAZOLE 500 MG/100ML
500 INJECTION, SOLUTION INTRAVENOUS EVERY 8 HOURS
Status: DISCONTINUED | OUTPATIENT
Start: 2023-02-02 | End: 2023-02-06

## 2023-02-01 RX ORDER — LEVOFLOXACIN 5 MG/ML
750 INJECTION, SOLUTION INTRAVENOUS EVERY 24 HOURS
Status: DISCONTINUED | OUTPATIENT
Start: 2023-02-02 | End: 2023-02-02

## 2023-02-01 RX ORDER — POLYETHYLENE GLYCOL 3350 17 G/17G
17 POWDER, FOR SOLUTION ORAL DAILY PRN
Status: DISCONTINUED | OUTPATIENT
Start: 2023-02-01 | End: 2023-02-07 | Stop reason: HOSPADM

## 2023-02-01 RX ORDER — ACETAMINOPHEN 325 MG/1
650 TABLET ORAL EVERY 6 HOURS PRN
Status: DISCONTINUED | OUTPATIENT
Start: 2023-02-01 | End: 2023-02-07 | Stop reason: HOSPADM

## 2023-02-01 RX ORDER — SODIUM CHLORIDE 0.9 % (FLUSH) 0.9 %
5-40 SYRINGE (ML) INJECTION EVERY 12 HOURS SCHEDULED
Status: DISCONTINUED | OUTPATIENT
Start: 2023-02-01 | End: 2023-02-07 | Stop reason: HOSPADM

## 2023-02-01 RX ORDER — 0.9 % SODIUM CHLORIDE 0.9 %
1000 INTRAVENOUS SOLUTION INTRAVENOUS ONCE
Status: COMPLETED | OUTPATIENT
Start: 2023-02-01 | End: 2023-02-01

## 2023-02-01 RX ORDER — SODIUM CHLORIDE, SODIUM LACTATE, POTASSIUM CHLORIDE, CALCIUM CHLORIDE 600; 310; 30; 20 MG/100ML; MG/100ML; MG/100ML; MG/100ML
INJECTION, SOLUTION INTRAVENOUS CONTINUOUS
Status: DISCONTINUED | OUTPATIENT
Start: 2023-02-01 | End: 2023-02-02

## 2023-02-01 RX ORDER — METRONIDAZOLE 500 MG/100ML
500 INJECTION, SOLUTION INTRAVENOUS ONCE
Status: COMPLETED | OUTPATIENT
Start: 2023-02-01 | End: 2023-02-01

## 2023-02-01 RX ORDER — ENOXAPARIN SODIUM 100 MG/ML
40 INJECTION SUBCUTANEOUS DAILY
Status: DISCONTINUED | OUTPATIENT
Start: 2023-02-02 | End: 2023-02-02

## 2023-02-01 RX ORDER — MORPHINE SULFATE 4 MG/ML
4 INJECTION, SOLUTION INTRAMUSCULAR; INTRAVENOUS ONCE
Status: COMPLETED | OUTPATIENT
Start: 2023-02-01 | End: 2023-02-01

## 2023-02-01 RX ORDER — ROPINIROLE 0.5 MG/1
0.5 TABLET, FILM COATED ORAL NIGHTLY
Status: DISCONTINUED | OUTPATIENT
Start: 2023-02-02 | End: 2023-02-07 | Stop reason: HOSPADM

## 2023-02-01 RX ORDER — LEVOFLOXACIN 5 MG/ML
500 INJECTION, SOLUTION INTRAVENOUS ONCE
Status: COMPLETED | OUTPATIENT
Start: 2023-02-01 | End: 2023-02-02

## 2023-02-01 RX ORDER — ONDANSETRON 4 MG/1
4 TABLET, ORALLY DISINTEGRATING ORAL EVERY 8 HOURS PRN
Status: DISCONTINUED | OUTPATIENT
Start: 2023-02-01 | End: 2023-02-07 | Stop reason: HOSPADM

## 2023-02-01 RX ORDER — LEVOFLOXACIN 5 MG/ML
500 INJECTION, SOLUTION INTRAVENOUS EVERY 24 HOURS
Status: DISCONTINUED | OUTPATIENT
Start: 2023-02-02 | End: 2023-02-01 | Stop reason: DRUGHIGH

## 2023-02-01 RX ORDER — SODIUM CHLORIDE 9 MG/ML
INJECTION, SOLUTION INTRAVENOUS PRN
Status: DISCONTINUED | OUTPATIENT
Start: 2023-02-01 | End: 2023-02-07 | Stop reason: HOSPADM

## 2023-02-01 RX ORDER — ACETAMINOPHEN 650 MG/1
650 SUPPOSITORY RECTAL EVERY 6 HOURS PRN
Status: DISCONTINUED | OUTPATIENT
Start: 2023-02-01 | End: 2023-02-07 | Stop reason: HOSPADM

## 2023-02-01 RX ADMIN — IOPAMIDOL 75 ML: 755 INJECTION, SOLUTION INTRAVENOUS at 19:15

## 2023-02-01 RX ADMIN — HYDROMORPHONE HYDROCHLORIDE 0.5 MG: 1 INJECTION, SOLUTION INTRAMUSCULAR; INTRAVENOUS; SUBCUTANEOUS at 21:16

## 2023-02-01 RX ADMIN — SODIUM CHLORIDE 1000 ML: 9 INJECTION, SOLUTION INTRAVENOUS at 21:16

## 2023-02-01 RX ADMIN — MORPHINE SULFATE 4 MG: 4 INJECTION, SOLUTION INTRAMUSCULAR; INTRAVENOUS at 18:58

## 2023-02-01 RX ADMIN — METRONIDAZOLE 500 MG: 500 INJECTION, SOLUTION INTRAVENOUS at 21:23

## 2023-02-01 RX ADMIN — SODIUM CHLORIDE 1000 ML: 9 INJECTION, SOLUTION INTRAVENOUS at 18:57

## 2023-02-01 RX ADMIN — LEVOFLOXACIN 500 MG: 5 INJECTION, SOLUTION INTRAVENOUS at 23:29

## 2023-02-01 SDOH — ECONOMIC STABILITY: HOUSING INSECURITY: IN THE LAST 12 MONTHS, HOW MANY PLACES HAVE YOU LIVED?: 1

## 2023-02-01 SDOH — ECONOMIC STABILITY: FOOD INSECURITY: WITHIN THE PAST 12 MONTHS, YOU WORRIED THAT YOUR FOOD WOULD RUN OUT BEFORE YOU GOT MONEY TO BUY MORE.: NEVER TRUE

## 2023-02-01 SDOH — ECONOMIC STABILITY: INCOME INSECURITY: IN THE LAST 12 MONTHS, WAS THERE A TIME WHEN YOU WERE NOT ABLE TO PAY THE MORTGAGE OR RENT ON TIME?: NO

## 2023-02-01 SDOH — ECONOMIC STABILITY: HOUSING INSECURITY
IN THE LAST 12 MONTHS, WAS THERE A TIME WHEN YOU DID NOT HAVE A STEADY PLACE TO SLEEP OR SLEPT IN A SHELTER (INCLUDING NOW)?: NO

## 2023-02-01 SDOH — HEALTH STABILITY: PHYSICAL HEALTH: ON AVERAGE, HOW MANY MINUTES DO YOU ENGAGE IN EXERCISE AT THIS LEVEL?: 0 MIN

## 2023-02-01 SDOH — ECONOMIC STABILITY: FOOD INSECURITY: WITHIN THE PAST 12 MONTHS, THE FOOD YOU BOUGHT JUST DIDN'T LAST AND YOU DIDN'T HAVE MONEY TO GET MORE.: NEVER TRUE

## 2023-02-01 SDOH — HEALTH STABILITY: PHYSICAL HEALTH: ON AVERAGE, HOW MANY DAYS PER WEEK DO YOU ENGAGE IN MODERATE TO STRENUOUS EXERCISE (LIKE A BRISK WALK)?: 0 DAYS

## 2023-02-01 SDOH — ECONOMIC STABILITY: TRANSPORTATION INSECURITY
IN THE PAST 12 MONTHS, HAS LACK OF TRANSPORTATION KEPT YOU FROM MEETINGS, WORK, OR FROM GETTING THINGS NEEDED FOR DAILY LIVING?: NO

## 2023-02-01 SDOH — ECONOMIC STABILITY: TRANSPORTATION INSECURITY
IN THE PAST 12 MONTHS, HAS THE LACK OF TRANSPORTATION KEPT YOU FROM MEDICAL APPOINTMENTS OR FROM GETTING MEDICATIONS?: NO

## 2023-02-01 ASSESSMENT — SOCIAL DETERMINANTS OF HEALTH (SDOH)
DO YOU BELONG TO ANY CLUBS OR ORGANIZATIONS SUCH AS CHURCH GROUPS UNIONS, FRATERNAL OR ATHLETIC GROUPS, OR SCHOOL GROUPS?: NO
HOW OFTEN DO YOU ATTENT MEETINGS OF THE CLUB OR ORGANIZATION YOU BELONG TO?: NEVER
WITHIN THE LAST YEAR, HAVE TO BEEN RAPED OR FORCED TO HAVE ANY KIND OF SEXUAL ACTIVITY BY YOUR PARTNER OR EX-PARTNER?: NO
HOW OFTEN DO YOU ATTEND CHURCH OR RELIGIOUS SERVICES?: NEVER
IN A TYPICAL WEEK, HOW MANY TIMES DO YOU TALK ON THE PHONE WITH FAMILY, FRIENDS, OR NEIGHBORS?: MORE THAN THREE TIMES A WEEK
IN A TYPICAL WEEK, HOW MANY TIMES DO YOU TALK ON THE PHONE WITH FAMILY, FRIENDS, OR NEIGHBORS?: MORE THAN THREE TIMES A WEEK
WITHIN THE LAST YEAR, HAVE YOU BEEN HUMILIATED OR EMOTIONALLY ABUSED IN OTHER WAYS BY YOUR PARTNER OR EX-PARTNER?: NO
HOW OFTEN DO YOU GET TOGETHER WITH FRIENDS OR RELATIVES?: MORE THAN THREE TIMES A WEEK
HOW OFTEN DO YOU GET TOGETHER WITH FRIENDS OR RELATIVES?: MORE THAN THREE TIMES A WEEK
HOW OFTEN DO YOU ATTEND CHURCH OR RELIGIOUS SERVICES?: NEVER
DO YOU BELONG TO ANY CLUBS OR ORGANIZATIONS SUCH AS CHURCH GROUPS UNIONS, FRATERNAL OR ATHLETIC GROUPS, OR SCHOOL GROUPS?: NO
HOW HARD IS IT FOR YOU TO PAY FOR THE VERY BASICS LIKE FOOD, HOUSING, MEDICAL CARE, AND HEATING?: NOT HARD AT ALL
HOW OFTEN DO YOU ATTENT MEETINGS OF THE CLUB OR ORGANIZATION YOU BELONG TO?: NEVER
ARE YOU MARRIED, WIDOWED, DIVORCED, SEPARATED, NEVER MARRIED, OR LIVING WITH A PARTNER?: LIVING WITH PARTNER
ARE YOU MARRIED, WIDOWED, DIVORCED, SEPARATED, NEVER MARRIED, OR LIVING WITH A PARTNER?: LIVING WITH PARTNER
WITHIN THE LAST YEAR, HAVE YOU BEEN AFRAID OF YOUR PARTNER OR EX-PARTNER?: NO
WITHIN THE LAST YEAR, HAVE YOU BEEN KICKED, HIT, SLAPPED, OR OTHERWISE PHYSICALLY HURT BY YOUR PARTNER OR EX-PARTNER?: NO

## 2023-02-01 ASSESSMENT — PAIN DESCRIPTION - PAIN TYPE
TYPE: ACUTE PAIN
TYPE: ACUTE PAIN

## 2023-02-01 ASSESSMENT — PAIN DESCRIPTION - LOCATION
LOCATION: INCISION;ABDOMEN
LOCATION: ABDOMEN
LOCATION: ABDOMEN

## 2023-02-01 ASSESSMENT — PAIN SCALES - GENERAL
PAINLEVEL_OUTOF10: 10
PAINLEVEL_OUTOF10: 10
PAINLEVEL_OUTOF10: 8
PAINLEVEL_OUTOF10: 10

## 2023-02-01 ASSESSMENT — PATIENT HEALTH QUESTIONNAIRE - PHQ9
2. FEELING DOWN, DEPRESSED OR HOPELESS: NOT AT ALL
10. IF YOU CHECKED OFF ANY PROBLEMS, HOW DIFFICULT HAVE THESE PROBLEMS MADE IT FOR YOU TO DO YOUR WORK, TAKE CARE OF THINGS AT HOME, OR GET ALONG WITH OTHER PEOPLE: SOMEWHAT DIFFICULT
SUM OF ALL RESPONSES TO PHQ9 QUESTIONS 1 & 2: 1
1. LITTLE INTEREST OR PLEASURE IN DOING THINGS: SEVERAL DAYS

## 2023-02-01 ASSESSMENT — PAIN DESCRIPTION - ORIENTATION
ORIENTATION: LOWER;MID
ORIENTATION: ANTERIOR

## 2023-02-01 ASSESSMENT — PAIN DESCRIPTION - ONSET
ONSET: ON-GOING
ONSET: GRADUAL

## 2023-02-01 ASSESSMENT — PAIN - FUNCTIONAL ASSESSMENT
PAIN_FUNCTIONAL_ASSESSMENT: PREVENTS OR INTERFERES WITH MANY ACTIVE NOT PASSIVE ACTIVITIES
PAIN_FUNCTIONAL_ASSESSMENT: 0-10

## 2023-02-01 ASSESSMENT — PAIN DESCRIPTION - FREQUENCY
FREQUENCY: CONTINUOUS
FREQUENCY: CONTINUOUS

## 2023-02-01 ASSESSMENT — LIFESTYLE VARIABLES: HOW OFTEN DO YOU HAVE A DRINK CONTAINING ALCOHOL: NEVER

## 2023-02-01 ASSESSMENT — PAIN DESCRIPTION - DESCRIPTORS
DESCRIPTORS: ACHING
DESCRIPTORS: DISCOMFORT;STABBING

## 2023-02-01 NOTE — ED NOTES
Pt to ED rm 7 at this time. Pt states that she had surgery Monday to have mid-urethral sling removed. States she was discharged yesterday, pain has worsened since, and it's hard to breath/get comfortable when lying flat.          Naz Mares RN  02/01/23 [id number]

## 2023-02-01 NOTE — CARE COORDINATION
ACM attempted to reach patient for Care transitions call. HIPAA compliant message left requesting a return phone call at patients convenience. Will continue to follow.        Per chart message to MDO-  admit 1/30/23 REMOVAL OF SUBURETHRAL SLING, VAGINAL EXPLORATION, ABDOMINAL EXPLORATION  in pain today 2/1/23 trouble breathing going back to the ed

## 2023-02-01 NOTE — ED TRIAGE NOTES
States that she had surgery Monday to have mid-urethral sling removed. States she was discharged yesterday, pain has worsened since, and it's hard to breath/get comfortable when lying flat. Sling was originally placed November 9th. Sitting up with unlabored breathing.

## 2023-02-01 NOTE — CARE COORDINATION
[facility] Inpatient Care Transitions Interview     2023    Patient: Claribel Myers Patient : 1985   MRN: [id number]  Reason for Admission: admit 23 REMOVAL OF SUBURETHRAL SLING, VAGINAL EXPLORATION, ABDOMINAL EXPLORATION  RARS: Readmission Risk Score: 2.1       Spoke with: Pt today she is not doing well since this being home from the hospital. She is having sob which is new for her and increased pain/pain not well managed since she has been home from the hospital. Pt is using meds as prescribed with still no relief. Pain mainly in area of her incision with migration to he abd per pt. She is awaiting a ride to the ed, since given this direction by the md office. They are aware.       Readmission Risk  Patient Active Problem List   Diagnosis    GSI (genuine stress incontinence), female    Abnormal finding on urinalysis    Bipolar I disorder, most recent episode (or current) depressed, severe, specified as with psychotic behavior (HCC)    Chronic hepatitis C without hepatic coma (HCC)    Dysmenorrhea    History of heroin abuse (HCC)    Allergic reaction to substance       Inpatient Assessment  Care Transitions Summary    Care Transitions Inpatient Review  Medication Review  Do you have all of your prescriptions and are they filled?: Yes   Barriers to Medication Adherence: Forgets to take  Are you able to afford your medications?: Yes  How often do you have difficulty taking your medications?: Sometimes I take them as prescribed.  Housing Review  Who do you live with?: Child  Are you an active caregiver in your home?: Yes  For whom are you the caregiver?: daughter  Social Support  Durable Medical Equipment  Functional Review  Is patient able to live independently?: Yes  Hearing and Vision  Care Transitions Interventions     Other Services:  (Comment: Had sent pt Covid resource letter and Covid Zone tool)            Follow Up  Future Appointments   Date Time Provider Department Center   2023  10:00 AM [facility]  There are no preventive care reminders to display for this patient.     Aung Horta RN

## 2023-02-02 PROBLEM — N94.9 ADNEXAL CYST: Status: ACTIVE | Noted: 2023-02-02

## 2023-02-02 LAB
A/G RATIO: 1 (ref 1.1–2.2)
ALBUMIN SERPL-MCNC: 2.9 G/DL (ref 3.4–5)
ALP BLD-CCNC: 89 U/L (ref 40–129)
ALT SERPL-CCNC: 146 U/L (ref 10–40)
ANION GAP SERPL CALCULATED.3IONS-SCNC: 9 MMOL/L (ref 3–16)
AST SERPL-CCNC: 108 U/L (ref 15–37)
BASOPHILS ABSOLUTE: 0 K/UL (ref 0–0.2)
BASOPHILS RELATIVE PERCENT: 0.1 %
BILIRUB SERPL-MCNC: 0.4 MG/DL (ref 0–1)
BUN BLDV-MCNC: 9 MG/DL (ref 7–20)
C-REACTIVE PROTEIN: 7.4 MG/L (ref 0–5.1)
CALCIUM SERPL-MCNC: 8.2 MG/DL (ref 8.3–10.6)
CHLORIDE BLD-SCNC: 109 MMOL/L (ref 99–110)
CO2: 23 MMOL/L (ref 21–32)
CREAT SERPL-MCNC: 0.6 MG/DL (ref 0.6–1.1)
EOSINOPHILS ABSOLUTE: 0.1 K/UL (ref 0–0.6)
EOSINOPHILS RELATIVE PERCENT: 2.9 %
FERRITIN: 128 NG/ML (ref 15–150)
GFR SERPL CREATININE-BSD FRML MDRD: >60 ML/MIN/{1.73_M2}
GLUCOSE BLD-MCNC: 79 MG/DL (ref 70–99)
HCT VFR BLD CALC: 31 % (ref 36–48)
HEMOGLOBIN: 10.1 G/DL (ref 12–16)
IGA: 160 MG/DL (ref 70–400)
IRON SATURATION: 15 % (ref 15–50)
IRON: 46 UG/DL (ref 37–145)
LACTIC ACID, SEPSIS: 0.7 MMOL/L (ref 0.4–1.9)
LV EF: 58 %
LVEF MODALITY: NORMAL
LYMPHOCYTES ABSOLUTE: 1.6 K/UL (ref 1–5.1)
LYMPHOCYTES RELATIVE PERCENT: 36.8 %
MCH RBC QN AUTO: 28.6 PG (ref 26–34)
MCHC RBC AUTO-ENTMCNC: 32.7 G/DL (ref 31–36)
MCV RBC AUTO: 87.3 FL (ref 80–100)
MONOCYTES ABSOLUTE: 0.3 K/UL (ref 0–1.3)
MONOCYTES RELATIVE PERCENT: 6.9 %
NEUTROPHILS ABSOLUTE: 2.3 K/UL (ref 1.7–7.7)
NEUTROPHILS RELATIVE PERCENT: 53.3 %
PDW BLD-RTO: 14.6 % (ref 12.4–15.4)
PLATELET # BLD: 97 K/UL (ref 135–450)
PLATELET SLIDE REVIEW: ABNORMAL
PMV BLD AUTO: 8.3 FL (ref 5–10.5)
POTASSIUM REFLEX MAGNESIUM: 3.9 MMOL/L (ref 3.5–5.1)
PROCALCITONIN: 0.2 NG/ML (ref 0–0.15)
RBC # BLD: 3.55 M/UL (ref 4–5.2)
RBC # BLD: NORMAL 10*6/UL
SEDIMENTATION RATE, ERYTHROCYTE: 8 MM/HR (ref 0–20)
SLIDE REVIEW: ABNORMAL
SODIUM BLD-SCNC: 141 MMOL/L (ref 136–145)
TOTAL IRON BINDING CAPACITY: 309 UG/DL (ref 260–445)
TOTAL PROTEIN: 5.7 G/DL (ref 6.4–8.2)
TSH REFLEX FT4: 3.27 UIU/ML (ref 0.27–4.2)
URINE CULTURE, ROUTINE: NORMAL
WBC # BLD: 4.2 K/UL (ref 4–11)

## 2023-02-02 PROCEDURE — 80074 ACUTE HEPATITIS PANEL: CPT

## 2023-02-02 PROCEDURE — 82103 ALPHA-1-ANTITRYPSIN TOTAL: CPT

## 2023-02-02 PROCEDURE — 86038 ANTINUCLEAR ANTIBODIES: CPT

## 2023-02-02 PROCEDURE — 86140 C-REACTIVE PROTEIN: CPT

## 2023-02-02 PROCEDURE — 2580000003 HC RX 258: Performed by: NURSE PRACTITIONER

## 2023-02-02 PROCEDURE — 82390 ASSAY OF CERULOPLASMIN: CPT

## 2023-02-02 PROCEDURE — 6370000000 HC RX 637 (ALT 250 FOR IP): Performed by: NURSE PRACTITIONER

## 2023-02-02 PROCEDURE — 6370000000 HC RX 637 (ALT 250 FOR IP): Performed by: FAMILY MEDICINE

## 2023-02-02 PROCEDURE — 6360000002 HC RX W HCPCS: Performed by: NURSE PRACTITIONER

## 2023-02-02 PROCEDURE — 93306 TTE W/DOPPLER COMPLETE: CPT

## 2023-02-02 PROCEDURE — 36415 COLL VENOUS BLD VENIPUNCTURE: CPT

## 2023-02-02 PROCEDURE — 80053 COMPREHEN METABOLIC PANEL: CPT

## 2023-02-02 PROCEDURE — 82784 ASSAY IGA/IGD/IGG/IGM EACH: CPT

## 2023-02-02 PROCEDURE — 82105 ALPHA-FETOPROTEIN SERUM: CPT

## 2023-02-02 PROCEDURE — 86708 HEPATITIS A ANTIBODY: CPT

## 2023-02-02 PROCEDURE — 2500000003 HC RX 250 WO HCPCS: Performed by: NURSE PRACTITIONER

## 2023-02-02 PROCEDURE — 85025 COMPLETE CBC W/AUTO DIFF WBC: CPT

## 2023-02-02 PROCEDURE — 94760 N-INVAS EAR/PLS OXIMETRY 1: CPT

## 2023-02-02 PROCEDURE — 1200000000 HC SEMI PRIVATE

## 2023-02-02 PROCEDURE — 2580000003 HC RX 258: Performed by: FAMILY MEDICINE

## 2023-02-02 PROCEDURE — 84145 PROCALCITONIN (PCT): CPT

## 2023-02-02 PROCEDURE — 85652 RBC SED RATE AUTOMATED: CPT

## 2023-02-02 PROCEDURE — 86015 ACTIN ANTIBODY EACH: CPT

## 2023-02-02 PROCEDURE — 82728 ASSAY OF FERRITIN: CPT

## 2023-02-02 PROCEDURE — 99231 SBSQ HOSP IP/OBS SF/LOW 25: CPT | Performed by: NURSE PRACTITIONER

## 2023-02-02 PROCEDURE — 83516 IMMUNOASSAY NONANTIBODY: CPT

## 2023-02-02 PROCEDURE — 84443 ASSAY THYROID STIM HORMONE: CPT

## 2023-02-02 PROCEDURE — 83550 IRON BINDING TEST: CPT

## 2023-02-02 PROCEDURE — 83540 ASSAY OF IRON: CPT

## 2023-02-02 PROCEDURE — 6360000002 HC RX W HCPCS: Performed by: FAMILY MEDICINE

## 2023-02-02 PROCEDURE — 86706 HEP B SURFACE ANTIBODY: CPT

## 2023-02-02 RX ORDER — PANTOPRAZOLE SODIUM 40 MG/1
40 TABLET, DELAYED RELEASE ORAL
Status: DISCONTINUED | OUTPATIENT
Start: 2023-02-03 | End: 2023-02-04

## 2023-02-02 RX ORDER — CALCIUM CARBONATE 200(500)MG
500 TABLET,CHEWABLE ORAL 3 TIMES DAILY PRN
Status: DISCONTINUED | OUTPATIENT
Start: 2023-02-02 | End: 2023-02-04

## 2023-02-02 RX ADMIN — ROPINIROLE HYDROCHLORIDE 0.5 MG: 0.5 TABLET, FILM COATED ORAL at 00:42

## 2023-02-02 RX ADMIN — METRONIDAZOLE 500 MG: 500 INJECTION, SOLUTION INTRAVENOUS at 20:58

## 2023-02-02 RX ADMIN — DIPHENHYDRAMINE HYDROCHLORIDE 12.5 MG: 50 INJECTION, SOLUTION INTRAMUSCULAR; INTRAVENOUS at 17:11

## 2023-02-02 RX ADMIN — HYDROMORPHONE HYDROCHLORIDE 1 MG: 1 INJECTION, SOLUTION INTRAMUSCULAR; INTRAVENOUS; SUBCUTANEOUS at 08:07

## 2023-02-02 RX ADMIN — SODIUM CHLORIDE, POTASSIUM CHLORIDE, SODIUM LACTATE AND CALCIUM CHLORIDE: 600; 310; 30; 20 INJECTION, SOLUTION INTRAVENOUS at 00:40

## 2023-02-02 RX ADMIN — ONDANSETRON 4 MG: 2 INJECTION INTRAMUSCULAR; INTRAVENOUS at 14:54

## 2023-02-02 RX ADMIN — ONDANSETRON 4 MG: 2 INJECTION INTRAMUSCULAR; INTRAVENOUS at 21:00

## 2023-02-02 RX ADMIN — CEFTRIAXONE 1000 MG: 1 INJECTION, POWDER, FOR SOLUTION INTRAMUSCULAR; INTRAVENOUS at 17:17

## 2023-02-02 RX ADMIN — METRONIDAZOLE 500 MG: 500 INJECTION, SOLUTION INTRAVENOUS at 04:45

## 2023-02-02 RX ADMIN — DIPHENHYDRAMINE HYDROCHLORIDE 12.5 MG: 50 INJECTION, SOLUTION INTRAMUSCULAR; INTRAVENOUS at 23:11

## 2023-02-02 RX ADMIN — ONDANSETRON 4 MG: 2 INJECTION INTRAMUSCULAR; INTRAVENOUS at 08:07

## 2023-02-02 RX ADMIN — ANTACID TABLETS 500 MG: 500 TABLET, CHEWABLE ORAL at 17:09

## 2023-02-02 RX ADMIN — DIPHENHYDRAMINE HYDROCHLORIDE 12.5 MG: 50 INJECTION, SOLUTION INTRAMUSCULAR; INTRAVENOUS at 03:26

## 2023-02-02 RX ADMIN — DIPHENHYDRAMINE HYDROCHLORIDE 12.5 MG: 50 INJECTION, SOLUTION INTRAMUSCULAR; INTRAVENOUS at 10:34

## 2023-02-02 RX ADMIN — HYDROMORPHONE HYDROCHLORIDE 1 MG: 1 INJECTION, SOLUTION INTRAMUSCULAR; INTRAVENOUS; SUBCUTANEOUS at 17:11

## 2023-02-02 RX ADMIN — ROPINIROLE HYDROCHLORIDE 0.5 MG: 0.5 TABLET, FILM COATED ORAL at 20:54

## 2023-02-02 RX ADMIN — SODIUM CHLORIDE, PRESERVATIVE FREE 10 ML: 5 INJECTION INTRAVENOUS at 00:33

## 2023-02-02 RX ADMIN — HYDROMORPHONE HYDROCHLORIDE 1 MG: 1 INJECTION, SOLUTION INTRAMUSCULAR; INTRAVENOUS; SUBCUTANEOUS at 00:32

## 2023-02-02 RX ADMIN — HYDROMORPHONE HYDROCHLORIDE 1 MG: 1 INJECTION, SOLUTION INTRAMUSCULAR; INTRAVENOUS; SUBCUTANEOUS at 04:44

## 2023-02-02 RX ADMIN — METRONIDAZOLE 500 MG: 500 INJECTION, SOLUTION INTRAVENOUS at 16:09

## 2023-02-02 RX ADMIN — HYDROMORPHONE HYDROCHLORIDE 1 MG: 1 INJECTION, SOLUTION INTRAMUSCULAR; INTRAVENOUS; SUBCUTANEOUS at 20:54

## 2023-02-02 RX ADMIN — HYDROMORPHONE HYDROCHLORIDE 1 MG: 1 INJECTION, SOLUTION INTRAMUSCULAR; INTRAVENOUS; SUBCUTANEOUS at 13:33

## 2023-02-02 ASSESSMENT — PAIN DESCRIPTION - ONSET
ONSET: GRADUAL
ONSET: GRADUAL
ONSET: ON-GOING

## 2023-02-02 ASSESSMENT — PAIN SCALES - GENERAL
PAINLEVEL_OUTOF10: 9
PAINLEVEL_OUTOF10: 10
PAINLEVEL_OUTOF10: 5
PAINLEVEL_OUTOF10: 10
PAINLEVEL_OUTOF10: 8
PAINLEVEL_OUTOF10: 10
PAINLEVEL_OUTOF10: 9

## 2023-02-02 ASSESSMENT — PAIN DESCRIPTION - FREQUENCY
FREQUENCY: CONTINUOUS

## 2023-02-02 ASSESSMENT — PAIN DESCRIPTION - DESCRIPTORS
DESCRIPTORS: ACHING
DESCRIPTORS: STABBING;ACHING;DISCOMFORT
DESCRIPTORS: STABBING

## 2023-02-02 ASSESSMENT — PAIN DESCRIPTION - LOCATION
LOCATION: ABDOMEN

## 2023-02-02 ASSESSMENT — ENCOUNTER SYMPTOMS
ABDOMINAL PAIN: 1
COLOR CHANGE: 1
SHORTNESS OF BREATH: 1
DIARRHEA: 0
ABDOMINAL DISTENTION: 0
VOMITING: 0
COUGH: 0
EYES NEGATIVE: 1
NAUSEA: 0
BACK PAIN: 0

## 2023-02-02 ASSESSMENT — PAIN DESCRIPTION - ORIENTATION
ORIENTATION: ANTERIOR
ORIENTATION: RIGHT
ORIENTATION: MID

## 2023-02-02 ASSESSMENT — PAIN - FUNCTIONAL ASSESSMENT: PAIN_FUNCTIONAL_ASSESSMENT: ACTIVITIES ARE NOT PREVENTED

## 2023-02-02 ASSESSMENT — PAIN DESCRIPTION - PAIN TYPE
TYPE: ACUTE PAIN

## 2023-02-02 NOTE — PLAN OF CARE
Problem: Discharge Planning  Goal: Discharge to home or other facility with appropriate resources  Outcome: Progressing  Flowsheets (Taken 2/2/2023 0009)  Discharge to home or other facility with appropriate resources:   Identify barriers to discharge with patient and caregiver   Arrange for needed discharge resources and transportation as appropriate   Identify discharge learning needs (meds, wound care, etc)     Problem: Pain  Goal: Verbalizes/displays adequate comfort level or baseline comfort level  Outcome: Progressing     Problem: ABCDS Injury Assessment  Goal: Absence of physical injury  Outcome: Progressing

## 2023-02-02 NOTE — PROGRESS NOTES
Pt arrived to unit via stretcher. Pt admitted to room 3119. Pt ambulated from stretcher to bed without difficulties.

## 2023-02-02 NOTE — PROGRESS NOTES
Medication Reconciliation    List of medications patient is currently taking is complete. Source of information: 1.  Conversation with patient at bedside                                      2. EPIC records      Allergies  Cobalt, Nickel, Other, and Penicillins

## 2023-02-02 NOTE — PROGRESS NOTES
Hospitalist Progress Note      PCP: LINA Domínguez - CNP    Date of Admission: 2/1/2023      Hospital Course:     Impression:     Seth Fair is a 40 y.o. female who presented with PMHx Hepatitis C, restless leg syndrome and recent surgery on Monday for removal of suburethral sling, vaginal exploration, abdominal exploration. Presented to the ED with c/o lower abdominal pain since surgery with bruising and swelling over suprapubic area, and then Tuesday developed right upper quadrant pain. Admitted for possible cholecystitis. A/P:    Abdominal pain: improved relative to admission. Patient complains of both surgical site discomfort as well as RUQ pain intermittently. 2. ? Acute Cholecystitis: rocephin flagyl, gen surg consulted. 3. Labial hematoma S/p urethral sling removal: appreciate urology eval, will manage hematoma conservatively. 4. Pruritis: possibly related to drug rxn, will change levaquin to rocephin. Ok to continue prn benadryl for now. 5. Elevated LFT hx  hep c: GI consulted. Trend liver functions. Code status: full code    Dvt ppx: mechanical    Transition of care: home    ADOD: possibly 2-3 days. 24 hour care plan:    Cont abx, cont pain control and prn benadryl. Conservative mgmt on pelvic site, gen surg consult, GI consulted.        Medications:  Reviewed    Infusion Medications    sodium chloride       Scheduled Medications    [START ON 2/3/2023] pantoprazole  40 mg Oral QAM AC    sodium chloride flush  5-40 mL IntraVENous 2 times per day    metroNIDAZOLE  500 mg IntraVENous Q8H    rOPINIRole  0.5 mg Oral Nightly    levofloxacin  750 mg IntraVENous Q24H     PRN Meds: perflutren lipid microspheres, sodium chloride flush, sodium chloride, ondansetron **OR** ondansetron, polyethylene glycol, acetaminophen **OR** acetaminophen, HYDROmorphone, diphenhydrAMINE, magnesium sulfate      Intake/Output Summary (Last 24 hours) at 2/2/2023 1308  Last data filed at 2/2/2023 0754  Gross per 24 hour   Intake 2800 ml   Output --   Net 2800 ml       Exam:    /71   Pulse 82   Temp 99.4 °F (37.4 °C) (Oral)   Resp 16   Ht 5' 3\" (1.6 m)   Wt 197 lb 1.5 oz (89.4 kg)   LMP 01/03/2023   SpO2 94%   BMI 34.91 kg/m²     General appearance: No apparent distress, appears stated age and cooperative. HEENT: Pupils equal, round, and reactive to light. Conjunctivae/corneas clear. Neck: Supple, with full range of motion. No jugular venous distention. Trachea midline. Respiratory:  Normal respiratory effort. Clear to auscultation, bilaterally without Rales/Wheezes/Rhonchi. Cardiovascular: Regular rate and rhythm with normal S1/S2 without murmurs, rubs or gallops. Abdomen: Soft, non-tender, non-distended with normal bowel sounds. Musculoskeletal: No clubbing, cyanosis or edema bilaterally. Full range of motion without deformity. Skin: Skin color, texture, turgor normal.  No rashes or lesions. Neurologic:  Neurovascularly intact without any focal sensory/motor deficits. Cranial nerves: II-XII intact, grossly non-focal.  Psychiatric: Alert and oriented, thought content appropriate, normal insight  Capillary Refill: Brisk,< 3 seconds   Peripheral Pulses: +2 palpable, equal bilaterally       Labs:   Recent Labs     02/01/23 1811 02/02/23 0515   WBC 4.8 4.2   HGB 11.9* 10.1*   HCT 36.8 31.0*   * 97*     Recent Labs     02/01/23 1811 02/02/23  0515   * 141   K 3.4* 3.9    109   CO2 21 23   BUN 12 9   CREATININE 0.7 0.6   CALCIUM 8.9 8.2*     Recent Labs     02/01/23 1811 02/02/23  0515   * 108*   * 146*   BILITOT 0.3 0.4   ALKPHOS 116 89     No results for input(s): INR in the last 72 hours.   Recent Labs     02/01/23 1811   TROPONINI <0.01       Urinalysis:      Lab Results   Component Value Date/Time    NITRU Negative 02/01/2023 06:11 PM    NITRU neg 09/07/2022 12:00 AM    WBCUA 42 02/01/2023 06:11 PM    BACTERIA 3+ 02/01/2023 06:11 PM    RBCUA 32 02/01/2023 06:11 PM    BLOODU LARGE 02/01/2023 06:11 PM    SPECGRAV 1.020 02/01/2023 06:11 PM    GLUCOSEU Negative 02/01/2023 06:11 PM    GLUCOSEU NEGATIVE 05/29/2012 11:35 PM       Radiology:  US ABDOMEN LIMITED   Final Result   1. Mild gallbladder wall thickening and trace pericholecystic fluid without   sonographic Schaffer's sign or stones favored to be related to underlying liver   dysfunction. Acute acalculous cholecystitis is in the differential but felt   to be less likely. If there is persistent clinical suspicion for acute   cholecystitis, recommend HIDA scan. 2. Changes of diffuse liver disease. Clinical correlation advised. CT ABDOMEN PELVIS W IV CONTRAST Additional Contrast? None   Final Result   1. Moderate gallbladder wall thickening with small pericholecystic fluid. This may reflect acute cholecystitis versus reactive change to underlying   diffuse liver disease. 2. Suspected diffuse liver disease with mild splenomegaly. Clinical   correlation advised. 3. Subcutaneous fat stranding of the low anterior abdominal wall with high   density ovoid lesion versus collection in the pubic region measuring 3.1 cm,   possible hematoma. This could be better evaluated with limited ultrasound. Recommend imaging follow-up to resolution. 4. Simple cystic lesion of the left adnexa measuring 7.4 cm. Recommendation   below. 5. Scattered subcentimeter and pericentimeter short axis retroperitoneal and   upper abdominal the lymph nodes, indeterminate. RECOMMENDATIONS:   7.4 cm left adnexal simple-appearing cyst. Recommend follow-up pelvic US in   3-6 months. Reference: JACR 2020 Feb;17(2):248-254            CT CHEST PULMONARY EMBOLISM W CONTRAST   Final Result   No evidence of pulmonary embolism or aortic dissection. Small bilateral pleural effusions. Mild adjacent airspace disease is likely   passive atelectasis, but pneumonia and edema remain considerations.       Cirrhotic morphology of the liver, with splenomegaly. Upper abdominal   lymphadenopathy, incompletely evaluated.                  Assessment/Plan:    Active Hospital Problems    Diagnosis Date Noted    Adnexal cyst [N94.9] 02/02/2023     Priority: Medium    Abdominal pain [R10.9] 02/01/2023     Priority: Medium    Hypomagnesemia [E83.42] 02/01/2023     Priority: Medium    Thrombocytopenia ([address] [D69.6] 02/01/2023     Priority: Medium    Cholecystitis [K81.9] 02/01/2023     Priority: [address], DO

## 2023-02-02 NOTE — CONSULTS
General Surgery Consult     [facility]   : 1985 MRN: [id number]  Date of Admission: 2023  Admitting Adelaida Shen MD  Primary Care Physician: Cydne Boxer, APRN - CNP  Consulting Physician: AUGUSTINA MILLS    Reason for Consult: cholecystitis vs hepatitis    Chief complaint:  lower abdominal pain, leg swelling. Diagnosis Present on Admission:   Abdominal pain   Hypomagnesemia   Thrombocytopenia ([address]   Cholecystitis   Adnexal cyst      History of Present Illness  Cassandra L Monda Gaucher is a 40 y.o. female PMH hep C, stress urniary incontinence s/p retropubic sling admitted on 2023 with lower abdominal pain and worsening SOB and lower extremity edema. Pt underwent suburethral sling removal with vaginal and abdominal exploration this past Monday. Sling was removed due to pt developing allergic reaction to the mesh causing diffuse pruritis. She was discharged on Tuesday and the following day developed pelvic pain at surgical site that radiated to RLQ and up to her RUQ. She now also endorses epigastric and back pain. Pain not worse with oral intake. Denies N/V, diarrhea or constipation. Feels she is swollen in lower extremities, hands and neck. CT abd/pelvis findings of moderate gallbladder wall thickening with small pericholecystic fluid. This may reflect acute cholecystitis versus reactive change to underlying diffuse liver disease. Suspected diffuse liver disease with mild splenomegaly. RUQ US showed coarsened hepatic echotexture with faintly nodular contour, mild gallbladder wall thickening and trace pericholecystic fluid without sonographic Schaffer's sign or stones          Review of Systems  CONSTITUTIONAL: No weight loss, fever, chills, weakness or fatigue. HEENT: Eyes: No diplopia or blurred vision. ENT: No earache, sore throat or runny nose.   CARDIOVASCULAR: No pressure, squeezing, strangling, tightness, heaviness or aching about the chest, neck, axilla or epigastrium. RESPIRATORY: No cough, shortness of breath, PND or orthopnea. GASTROINTESTINAL: No nausea, vomiting or diarrhea. GENITOURINARY: No dysuria, frequency or urgency. MUSCULOSKELETAL: No muscle, back pain, joint pain or stiffness  SKIN: No change in skin, hair or nails. NEUROLOGIC: No paresthesias, fasciculations, seizures or weakness. PSYCHIATRIC: No disorder of thought or mood. ENDOCRINE: No heat or cold intolerance, polyuria or polydipsia. HEMATOLOGICAL: No easy bruising or bleeding.         Past Medical History:   Diagnosis Date    Anxiety     Depression     Hepatitis C     asymptomatic    IV drug abuse ([facility] [address]     CLEAN SINCE     Ovarian cyst     Restless leg syndrome     Urinary incontinence 2022    Wears glasses      Past Surgical History:   Procedure Laterality Date    CYSTOSCOPY N/A 2022    CYSTOSCOPY performed by Sheri Shields MD at [address] [place] [address]  02/10/2016    diagnostic laparoscopy, KTP laser of endometriosis     TUBAL LIGATION      URETHRAL SURGERY N/A 2022    RETROPUBIC SLING performed by Sheri Shields MD at [address] [place] [address] N/A 2023    REMOVAL OF SUBURETHRAL SLING, VAGINAL EXPLORATION, ABDOMINAL EXPLORATION performed by Sheri Shields MD at [facility] history reviewed, noncontributory to current condition or decision making  Social History     Socioeconomic History    Marital status: Legally      Spouse name: Not on file    Number of children: Not on file    Years of education: Not on file    Highest education level: Not on file   Occupational History    Not on file   Tobacco Use    Smoking status: Former     Packs/day: 0.25     Years: 20.00     Pack years: 5.00     Types: Cigarettes     Quit date: 12/15/2018     Years since quittin.1    Smokeless tobacco: Never    Tobacco comments:     Not smoking at all now   Vaping Use    Vaping Use: Former    Quit date: 2021    Substances: No Nicotine (twice a day), Flavoring    Devices: Disposable   Substance and Sexual Activity    Alcohol use: No    Drug use: Not Currently     Types: IV     Comment: Been clean since 11/12/2013    Sexual activity: Yes     Partners: Male   Other Topics Concern    Not on file   Social History Narrative    Not on file     Social Determinants of Health     Financial Resource Strain: Low Risk     Difficulty of Paying Living Expenses: Not hard at all   Food Insecurity: No Food Insecurity    Worried About [address] in the Last Year: Never true    [address] [facility] [place] in the Last Year: Never true   Transportation Needs: No Transportation Needs    Lack of Transportation (Medical): No    Lack of Transportation (Non-Medical): No   Physical Activity: Inactive    Days of Exercise per Week: 0 days    Minutes of Exercise per Session: 0 min   Stress: No Stress Concern Present    Feeling of Stress : Only a little   Social Connections: Moderately Isolated    Frequency of Communication with Friends and Family: More than three times a week    Frequency of Social Gatherings with Friends and Family: More than three times a week    Attends Muslim Services: Never    Active Member of Clubs or Organizations: No    Attends Club or Organization Meetings: Never    Marital Status: Living with partner   Intimate Partner Violence: Not At Risk    Fear of Current or Ex-Partner: No    Emotionally Abused: No    Physically Abused: No    Sexually Abused: No   Housing Stability: Low Risk     Unable to Pay for Housing in the Last Year: No    Number of [place] in the Last Year: 1    Unstable Housing in the Last Year: No     Allergies   Allergen Reactions    [place] Rash     PER ALLERGY TESTING    Nickel Rash     PER ALLERGY TESTING    Other Itching and Rash     MESH FROM SLING    Penicillins Hives     Prior to Admission medications    Medication Sig Start Date End Date Taking?  Authorizing Provider   hydrOXYzine HCl (ATARAX) 10 MG tablet Take 1 tablet by mouth 3 times daily as needed for Itching 1/31/23 2/3/23  LINA Ying CNP   ibuprofen (ADVIL;MOTRIN) 600 MG tablet Take 1 tablet by mouth every 6 hours as needed for Pain 1/31/23 2/14/23  LINA Ying CNP   oxyCODONE (ROXICODONE) 5 MG immediate release tablet Take 1 tablet by mouth every 6 hours as needed for Pain for up to 5 days. Intended supply: 5 days. Take lowest dose possible to manage pain Max Daily Amount: 20 mg 1/31/23 2/5/23  LINA Ying CNP   famotidine (PEPCID) 40 MG tablet Take 1 tablet by mouth daily 12/16/22   Deysi Hernandez MD   rOPINIRole (REQUIP) 0.5 MG tablet Take 1 tablet by mouth at bedtime 12/2/22   Kristene Gilford, APRN - CNP           Physical Exam  Vitals:    02/01/23 2229 02/01/23 2259 02/02/23 0754 02/02/23 0916   BP:  115/70 115/71    Pulse: 79 98 82    Resp: 15 16 16 16   Temp:  98.7 °F (37.1 °C) 99.4 °F (37.4 °C)    TempSrc:  Oral Oral    SpO2: 100% 99% 95% 94%   Weight:  197 lb 1.5 oz (89.4 kg)     Height:  5' 3\" (1.6 m)           Intake/Output Summary (Last 24 hours) at 2/2/2023 1501  Last data filed at 2/2/2023 0754  Gross per 24 hour   Intake 2800 ml   Output --   Net 2800 ml       Body mass index is 34.91 kg/m². General Appearance: alert, well appearing, and in no distress   HEENT: NCAT, PERRLA, Conjunctiva non injected, no scleral icterus. External ears and nares normal bilaterally. Mucous membranes pink and moist.   Neck: Neck is symmetrical. Trachea appears midline. Lung: Clear to auscultation bilaterally, normal rate and effort   Cardiac: Regular rate and rhythm, S1S2 present, without murmur   Abdomen: Soft, appropriately tender, incision c/d/I. Mild distention. Neuro: No gross motor or sensory deficits. Skin: No open wounds or rashes.   MSK: normal ROM, no edema  Psych: normal insight, judgment    Labs  Recent Labs     02/01/23  1811 02/02/23  0515   WBC 4.8 4.2   HGB 11.9* 10.1*   HCT 36.8 31.0*   * 97*     Recent Labs 02/01/23  1811 02/02/23  0515   * 141   K 3.4* 3.9    109   CO2 21 23   BUN 12 9   MG 1.50*  --      Recent Labs     02/01/23  1811 02/02/23  0515   * 108*   * 146*     No results for input(s): UOSM in the last 72 hours. Invalid input(s): UAPR, [facility], [facility], [facility], [facility], [place], [place], [facility]    Imaging  US ABDOMEN LIMITED   Final Result   1. Mild gallbladder wall thickening and trace pericholecystic fluid without   sonographic Schaffer's sign or stones favored to be related to underlying liver   dysfunction. Acute acalculous cholecystitis is in the differential but felt   to be less likely. If there is persistent clinical suspicion for acute   cholecystitis, recommend HIDA scan. 2. Changes of diffuse liver disease. Clinical correlation advised. CT ABDOMEN PELVIS W IV CONTRAST Additional Contrast? None   Final Result   1. Moderate gallbladder wall thickening with small pericholecystic fluid. This may reflect acute cholecystitis versus reactive change to underlying   diffuse liver disease. 2. Suspected diffuse liver disease with mild splenomegaly. Clinical   correlation advised. 3. Subcutaneous fat stranding of the low anterior abdominal wall with high   density ovoid lesion versus collection in the pubic region measuring 3.1 cm,   possible hematoma. This could be better evaluated with limited ultrasound. Recommend imaging follow-up to resolution. 4. Simple cystic lesion of the left adnexa measuring 7.4 cm. Recommendation   below. 5. Scattered subcentimeter and pericentimeter short axis retroperitoneal and   upper abdominal the lymph nodes, indeterminate. RECOMMENDATIONS:   7.4 cm left adnexal simple-appearing cyst. Recommend follow-up pelvic US in   3-6 months. Reference: JACR 2020 Feb;17(2):248-254            CT CHEST PULMONARY EMBOLISM W CONTRAST   Final Result   No evidence of pulmonary embolism or aortic dissection.       Small bilateral pleural effusions. Mild adjacent airspace disease is likely   passive atelectasis, but pneumonia and edema remain considerations. Cirrhotic morphology of the liver, with splenomegaly. Upper abdominal   lymphadenopathy, incompletely evaluated. Obed Wolf is a 40 y.o. female who presented to ED with SOB and extremity swelling, being seen in general surgical consultation for cholecystitis. Plan    1. Abdomianl pain, elevated LFTs, chronic HCV  Imaging findings secondary to diffuse liver disease and less likely primary cholecystitis. No plans for cholecystectomy. OK for diet from gen surg standpoint  Further recommendations per Dr. Aruna Mccallum.        Electronically signed by LINA Castro CNP on 2/2/23 at 3:01 PM EST

## 2023-02-02 NOTE — ED PROVIDER NOTES
[facility]        Pt Name: Enrrique Gil  MRN: [id number]  [place] 1985  Date of evaluation: 2/1/2023  Provider: SEGUNDO Wilde  PCP: LINA Locke CNP  Note Started: 7:41 PM EST     This patient was also seen and evaluated by Attending Physician Luis James DO.    CHIEF COMPLAINT       Chief Complaint   Patient presents with    Post-op Problem     States that she had surgery Monday to have mid-urethral sling removed. States she was discharged yesterday, pain has worsened since, and it's hard to breath/get comfortable when lying flat. HISTORY OF PRESENT ILLNESS   (Location, Timing/Onset, Context/Setting, Quality, Duration, Modifying Factors, Severity, Associated Signs and Symptoms)  Note limiting factors. Enrrique Gil is a 40 y.o. female who presents with report of significant pelvic pain. Patient has history of transurethral cysts sling for 6 pelvic floor support, and she developed an allergic reaction to some components of this, so she had the sling removed in surgery 2 days ago. She says that since she has been home the pain in that area is just worsening, there is bruising and swelling in the suprapubic area, firm, and it continues to worsen. She denies nausea or vomiting. Says she has been taking her pain medication but is not really helping. She denies any abnormal vaginal discharge or bleeding as far as she can tell. Denies any fall or injury since going home. Denies fever or feelings of general illness. Says she is able to urinate but it is uncomfortable. Nursing Notes were all reviewed and agreed with or any disagreements were addressed in the HPI. REVIEW OF SYSTEMS    (2-9 systems for level 4, 10 or more for level 5)     Positives and pertinent negatives as per HPI.      PAST MEDICAL HISTORY     Past Medical History:   Diagnosis Date    Anxiety     Depression     Hepatitis C     asymptomatic    IV drug abuse ([facility] [address]     CLEAN SINCE 2013    Ovarian cyst     Restless leg syndrome     Urinary incontinence 11/01/2022    Wears glasses        SURGICAL HISTORY     Past Surgical History:   Procedure Laterality Date    CYSTOSCOPY N/A 11/09/2022    CYSTOSCOPY performed by Elaine Mireles MD at [facility]  02/10/2016    diagnostic laparoscopy, KTP laser of endometriosis     TUBAL LIGATION      URETHRAL SURGERY N/A 11/09/2022    RETROPUBIC SLING performed by Elaine Mireles MD at [facility] 1/30/2023    REMOVAL OF SUBURETHRAL SLING, VAGINAL EXPLORATION, ABDOMINAL EXPLORATION performed by Elaine Mireles MD at [address] [place] [address]       Previous Medications    FAMOTIDINE (PEPCID) 40 MG TABLET    Take 1 tablet by mouth daily    HYDROXYZINE HCL (ATARAX) 10 MG TABLET    Take 1 tablet by mouth 3 times daily as needed for Itching    IBUPROFEN (ADVIL;MOTRIN) 600 MG TABLET    Take 1 tablet by mouth every 6 hours as needed for Pain    OXYCODONE (ROXICODONE) 5 MG IMMEDIATE RELEASE TABLET    Take 1 tablet by mouth every 6 hours as needed for Pain for up to 5 days. Intended supply: 5 days.  Take lowest dose possible to manage pain Max Daily Amount: 20 mg    ROPINIROLE (REQUIP) 0.5 MG TABLET    Take 1 tablet by mouth at bedtime       ALLERGIES     Cobalt, Nickel, Other, and Penicillins    FAMILYHISTORY       Family History   Problem Relation Age of Onset    Substance Abuse Mother     Mental Illness Mother     Suicide Mother     No Known Problems Father     Heart Disease Maternal Grandmother     Emphysema Maternal Grandmother     Diabetes Maternal Grandmother     Diabetes Maternal Grandfather     Heart Disease Paternal Grandmother     Pacemaker Paternal Grandmother     Diabetes Paternal Grandfather         SOCIAL HISTORY       Social History     Tobacco Use    Smoking status: Former     Packs/day: 0.25     Years: 20.00     Pack years: 5.00     Types: Cigarettes Quit date: 12/15/2018     Years since quittin.1    Smokeless tobacco: Never    Tobacco comments:     Not smoking at all now   Vaping Use    Vaping Use: Former    Quit date: 2021    Substances: No Nicotine (twice a day), Flavoring    Devices: Disposable   Substance Use Topics    Alcohol use: No    Drug use: Not Currently     Types: IV     Comment: Been clean since 2013       SCREENINGS    [place] Coma Scale  Eye Opening: Spontaneous  Best Verbal Response: Oriented  Best Motor Response: Obeys commands  [place] Coma Scale Score: 15      PHYSICAL EXAM    (up to 7 for level 4, 8 or more for level 5)     ED Triage Vitals [23 1709]   BP Temp Temp Source Heart Rate Resp SpO2 Height Weight   116/79 98.4 °F (36.9 °C) Oral 92 20 99 % 5' 3\" (1.6 m) 197 lb 12 oz (89.7 kg)       Physical Exam  Vitals and nursing note reviewed. Constitutional:       General: She is not in acute distress. Appearance: Normal appearance. She is not ill-appearing. HENT:      Head: Normocephalic and atraumatic. Nose: Nose normal.   Eyes:      General:         Right eye: No discharge. Left eye: No discharge. Cardiovascular:      Rate and Rhythm: Normal rate and regular rhythm. Heart sounds: Normal heart sounds. Pulmonary:      Effort: Pulmonary effort is normal. No respiratory distress. Breath sounds: Normal breath sounds. No stridor. No wheezing, rhonchi or rales. Abdominal:      General: Bowel sounds are normal. There is no distension. Palpations: Abdomen is soft. There is no mass. Tenderness: There is no guarding or rebound. Genitourinary:     Comments: There is swelling and purplish ecchymosis noted to the suprapubic area and the labia majora, with firmness and induration in the superior aspect, very tender to palpation. See image below. Musculoskeletal:         General: Normal range of motion. Cervical back: Normal range of motion.    Skin:     General: Skin is warm and dry.   Neurological:      General: No focal deficit present. Mental Status: She is alert and oriented to person, place, and time. Psychiatric:         Mood and Affect: Mood normal.         Behavior: Behavior normal.       DIAGNOSTIC RESULTS   LABS:    Labs Reviewed   URINALYSIS WITH REFLEX TO CULTURE - Abnormal; Notable for the following components:       Result Value    Clarity, UA CLOUDY (*)     Blood, Urine LARGE (*)     Protein, UA 30 (*)     Leukocyte Esterase, Urine MODERATE (*)     All other components within normal limits   CBC WITH AUTO DIFFERENTIAL - Abnormal; Notable for the following components:    Hemoglobin 11.9 (*)     Platelets 177 (*)     All other components within normal limits   COMPREHENSIVE METABOLIC PANEL W/ REFLEX TO MG FOR LOW K - Abnormal; Notable for the following components:    Sodium 135 (*)     Potassium reflex Magnesium 3.4 (*)     Albumin 3.3 (*)     Albumin/Globulin Ratio 1.0 (*)      (*)      (*)     All other components within normal limits   BRAIN NATRIURETIC PEPTIDE - Abnormal; Notable for the following components:    Pro- (*)     All other components within normal limits   MICROSCOPIC URINALYSIS - Abnormal; Notable for the following components:    Bacteria, UA 3+ (*)     WBC, UA 42 (*)     RBC, UA 32 (*)     Epithelial Cells, UA 16 (*)     Amorphous, UA Present (*)     Mucus, UA Present (*)     All other components within normal limits   MAGNESIUM - Abnormal; Notable for the following components:    Magnesium 1.50 (*)     All other components within normal limits   CULTURE, URINE   LACTATE, SEPSIS   TROPONIN   PREGNANCY, URINE   LACTATE, SEPSIS       When ordered only abnormal lab results are displayed. All other labs were within normal range or not returned as of this dictation. EKG:  When ordered, EKG's are interpreted by the Emergency Department Physician in the absence of a cardiologist.  Please see their note for interpretation of EKG.    RADIOLOGY:   All images such as plain radiographs, CT, Ultrasound and MRI are interpreted by a radiologist. Some images are visualized and preliminarily interpreted by me and/or the ED attending physician. Interpretation per the radiologist below, if available at the time of this note:    CT ABDOMEN PELVIS W IV CONTRAST Additional Contrast? None   Final Result   1. Moderate gallbladder wall thickening with small pericholecystic fluid. This may reflect acute cholecystitis versus reactive change to underlying   diffuse liver disease. 2. Suspected diffuse liver disease with mild splenomegaly. Clinical   correlation advised. 3. Subcutaneous fat stranding of the low anterior abdominal wall with high   density ovoid lesion versus collection in the pubic region measuring 3.1 cm,   possible hematoma. This could be better evaluated with limited ultrasound. Recommend imaging follow-up to resolution. 4. Simple cystic lesion of the left adnexa measuring 7.4 cm. Recommendation   below. 5. Scattered subcentimeter and pericentimeter short axis retroperitoneal and   upper abdominal the lymph nodes, indeterminate. RECOMMENDATIONS:   7.4 cm left adnexal simple-appearing cyst. Recommend follow-up pelvic US in   3-6 months. Reference: JACR 2020 Feb;17(2):248-254            CT CHEST PULMONARY EMBOLISM W CONTRAST   Final Result   No evidence of pulmonary embolism or aortic dissection. Small bilateral pleural effusions. Mild adjacent airspace disease is likely   passive atelectasis, but pneumonia and edema remain considerations. Cirrhotic morphology of the liver, with splenomegaly. Upper abdominal   lymphadenopathy, incompletely evaluated. US ABDOMEN LIMITED    (Results Pending)         CONSULTS:  IP CONSULT TO GENERAL SURGERY    PROCEDURES   Unless otherwise noted below, none.      Procedures    EMERGENCY DEPARTMENT COURSE and DIFFERENTIAL DIAGNOSIS/MDM:   Vitals:    Vitals: 02/01/23 1845 02/01/23 1900 02/01/23 2045 02/01/23 2100   BP: 108/77 (!) 109/58 106/60 (!) 105/56   Pulse: (!) 102 84 88 90   Resp: 12 17 14 15   Temp: 99.3 °F (37.4 °C)      TempSrc: Oral      SpO2: 100% 100% 99% 100%   Weight:       Height:           Patient was given the following medications:  Medications   0.9 % sodium chloride bolus (1,000 mLs IntraVENous New Bag 2/1/23 2116)   metronidazole (FLAGYL) 500 mg in 0.9% NaCl 100 mL IVPB premix (has no administration in time range)   levoFLOXacin (LEVAQUIN) 500 MG/100ML infusion 500 mg (has no administration in time range)   0.9 % sodium chloride bolus (0 mLs IntraVENous Stopped 2/1/23 1957)   morphine (PF) injection 4 mg (4 mg IntraVENous Given 2/1/23 1858)   iopamidol (ISOVUE-370) 76 % injection 75 mL (75 mLs IntraVENous Given 2/1/23 1915)   HYDROmorphone (DILAUDID) injection 0.5 mg (0.5 mg IntraVENous Given 2/1/23 2116)           Is this patient to be included in the SEP-1 Core Measure due to severe sepsis or septic shock? No   Exclusion criteria - the patient is NOT to be included for SEP-1 Core Measure due to:  2+ SIRS criteria are not met    Patient's urinalysis suggested possible infection, but her laboratory work-up otherwise reassuring. She had normal vital signs. She reports worsening pain. See physical exam findings above. Lab work-up showed elevated LFTs, consistent with prior testing, normal serum white blood cell count. Patient is not septic. CTPA negative for PE or other acute findings. Abdominal CT showed a likely postoperative hematoma, but also showed strong suggestion of acute cholecystitis. On reexamination the patient was indeed very tender to palpation in the right upper quadrant. I consulted the general surgeon on-call, Dr. Syl Wetzel, who advised that because patient has persistently elevated LFTs and evidence of liver disease on CT scan, she is not suitable for the OR at this time.   Patient does have past history of IV drug abuse and hepatitis C. Patient's pain was only minimally relieved in the ED for medications. She would benefit from hospital admission for further care. I consulted the hospitalist, who agreed to accept and admit the patient. Patient was given IV antibiotics in the ED. The patient verbalized understanding and agreement with this plan of care. CRITICAL CARE TIME   There was a high probability of clinically significant and/or life threatening deterioration in this patient's condition which required my urgent intervention. I independently provided 45 minutes of non-concurrent critical care out of the total shared critical care time provided. This excludes any time for separately reportable procedures. FINAL IMPRESSION      1. Acute cholecystitis    2. Elevated LFTs    3. Postoperative hematoma of subcutaneous tissue following non-dermatologic procedure          DISPOSITION/PLAN   DISPOSITION Decision To Admit 02/01/2023 09:14:23 PM      PATIENT REFERRED TO:  No follow-up provider specified.     DISCHARGE MEDICATIONS:  New Prescriptions    No medications on file       DISCONTINUED MEDICATIONS:  Discontinued Medications    No medications on file            (Please note that portions of this note were completed with a voice recognition program.  Efforts were made to edit the dictations but occasionally words are mis-transcribed.)    SEGUNDO Stephens (electronically signed)       Chidi Stephens  02/01/23 0472

## 2023-02-02 NOTE — CONSULTS
GASTROENTEROLOGY INPATIENT CONSULTATION        IDENTIFYING DATA/REASON FOR CONSULTATION   PATIENT:  Johnathon Baxter  MRN:  [id number]  ADMIT DATE: 2/1/2023  TIME OF EVALUATION: 2/2/2023 9:25 AM  HOSPITAL STAY:   LOS: 1 day     REASON FOR CONSULTATION:  Elevated liver enzymes    HISTORY OF PRESENT ILLNESS   Lindsey Harp is a 40 y.o. female with a PMH of Hepatitis C, restless leg syndrome, stress urinary incontinence s/p retropubic suburethral sling who presented on 2/1/2023 with lower abdominal pain and worsening SOB. Pt underwent suburethral sling removal with vaginal and abdominal exploration this past Monday. Sling was removed due to pt developing allergic reaction to the mesh causing diffuse pruritis. She was discharged on Tuesday and the following day developed pelvic pain at surgical site that radiated to RLQ and up to her RUQ. She now also endorses epigastric and back pain. Pain not worse with oral intake. Denies N/V, diarrhea or constipation. Feels she is swollen in lower extremities, hands and neck. CT abd/pelvis findings of moderate gallbladder wall thickening with small pericholecystic fluid. This may reflect acute cholecystitis versus reactive change to underlying diffuse liver disease. Suspected diffuse liver disease with mild splenomegaly. RUQ US showed coarsened hepatic echotexture with faintly nodular contour, mild gallbladder wall thickening and trace pericholecystic fluid without sonographic Schaffer's sign or stones    Alk Phos 89      Bilirubin 0.4  Platelets 281  Albumin 2.9           We have been consulted for elevated liver enzymes. Pt had elevated AST and ALT dating back to a least 2020. She has history of hepatitis C attributed to IV drug use. 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 Pt was seeing Dr. Khushbu Gonzales at [facility] for hepatitis C diagnosis. Serologic testing found genotype 1a hepatitis C.  Pt was never able to obtain Harvoni prescription due to insurance problems. Ultrasound in  did not show any acute liver abnormalities. Pt never completed Fibroscan. She has not  followed up with Dr. Ana Crum since. She endorses occasional alcohol use, denies tobacco or illicit drug use. Denies tylenol use. Was given ancef prior to her Surgery otherwise denies any new medications.         PAST MEDICAL, SURGICAL, FAMILY, and SOCIAL HISTORY     Past Medical History:   Diagnosis Date    Anxiety     Depression     Hepatitis C     asymptomatic    IV drug abuse ([address]     CLEAN SINCE     Ovarian cyst     Restless leg syndrome     Urinary incontinence 2022    Wears glasses      Past Surgical History:   Procedure Laterality Date    CYSTOSCOPY N/A 2022    CYSTOSCOPY performed by Darlina Koyanagi, MD at [address] [place] [address]  02/10/2016    diagnostic laparoscopy, KTP laser of endometriosis     TUBAL LIGATION      URETHRAL SURGERY N/A 2022    RETROPUBIC SLING performed by Darlina Koyanagi, MD at [address] [place] [address] N/A 2023    REMOVAL OF SUBURETHRAL SLING, VAGINAL EXPLORATION, ABDOMINAL EXPLORATION performed by Darlina Koyanagi, MD at [facility] History   Problem Relation Age of Onset    Substance Abuse Mother     Mental Illness Mother     Suicide Mother     No Known Problems Father     Heart Disease Maternal Grandmother     Emphysema Maternal Grandmother     Diabetes Maternal Grandmother     Diabetes Maternal Grandfather     Heart Disease Paternal Grandmother     Pacemaker Paternal Grandmother     Diabetes Paternal Grandfather      Social History     Socioeconomic History    Marital status: Legally      Spouse name: None    Number of children: None    Years of education: None    Highest education level: None   Tobacco Use    Smoking status: Former     Packs/day: 0.25     Years: 20.00     Pack years: 5.00     Types: Cigarettes     Quit date: 12/15/2018     Years since quittin.1    Smokeless tobacco: Never    Tobacco comments:     Not smoking at all now   Vaping Use    Vaping Use: Former    Quit date: 1/1/2021    Substances: No Nicotine (twice a day), Flavoring    Devices: Disposable   Substance and Sexual Activity    Alcohol use: No    Drug use: Not Currently     Types: IV     Comment: Been clean since 11/12/2013    Sexual activity: Yes     Partners: Male     Social Determinants of Health     Financial Resource Strain: Low Risk     Difficulty of Paying Living Expenses: Not hard at all   Food Insecurity: No Food Insecurity    Worried About [address] in the Last Year: Never true    [facility] in the Last Year: Never true   Transportation Needs: No Transportation Needs    Lack of Transportation (Medical): No    Lack of Transportation (Non-Medical): No   Physical Activity: Inactive    Days of Exercise per Week: 0 days    Minutes of Exercise per Session: 0 min   Stress: No Stress Concern Present    Feeling of Stress : Only a little   Social Connections:  Moderately Isolated    Frequency of Communication with Friends and Family: More than three times a week    Frequency of Social Gatherings with Friends and Family: More than three times a week    Attends Sabianism Services: Never    Active Member of Clubs or Organizations: No    Attends Club or Organization Meetings: Never    Marital Status: Living with partner   Intimate Partner Violence: Not At Risk    Fear of Current or Ex-Partner: No    Emotionally Abused: No    Physically Abused: No    Sexually Abused: No   Housing Stability: Low Risk     Unable to Pay for Housing in the Last Year: No    Number of [place] in the Last Year: 1    Unstable Housing in the Last Year: No       MEDICATIONS   SCHEDULED:  sodium chloride flush, 5-40 mL, 2 times per day  enoxaparin, 40 mg, Daily  metroNIDAZOLE, 500 mg, Q8H  rOPINIRole, 0.5 mg, Nightly  levofloxacin, 750 mg, Q24H      FLUIDS/DRIPS:     sodium chloride      lactated ringers IV soln 100 mL/hr at 02/02/23 0040     PRNs: sodium chloride flush, 5-40 mL, PRN  sodium chloride, , PRN  ondansetron, 4 mg, Q8H PRN   Or  ondansetron, 4 mg, Q6H PRN  polyethylene glycol, 17 g, Daily PRN  acetaminophen, 650 mg, Q6H PRN   Or  acetaminophen, 650 mg, Q6H PRN  HYDROmorphone, 1 mg, Q4H PRN  diphenhydrAMINE, 12.5 mg, Q6H PRN  magnesium sulfate, 2,000 mg, PRN      ALLERGIES:  She   Allergies   Allergen Reactions    [place] Rash     PER ALLERGY TESTING    Nickel Rash     PER ALLERGY TESTING    Other Itching and Rash     MESH FROM SLING    Penicillins Hives       REVIEW OF SYSTEMS   Pertinent ROS noted in HPI    PHYSICAL EXAM     Vitals:    02/01/23 2229 02/01/23 2259 02/02/23 0754 02/02/23 0916   BP:  115/70 115/71    Pulse: 79 98 82    Resp: 15 16 16 16   Temp:  98.7 °F (37.1 °C) 99.4 °F (37.4 °C)    TempSrc:  Oral Oral    SpO2: 100% 99% 95% 94%   Weight:  197 lb 1.5 oz (89.4 kg)     Height:  5' 3\" (1.6 m)         I/O last 3 completed shifts: In: 2100 [IV Piggyback:2100]  Out: -       Physical Exam:  General appearance: mild discomfort, appears stated age  Eyes: Anicteric  Head: Normocephalic, without obvious abnormality  Lungs: clear to auscultation bilaterally, Normal Effort  Heart: regular rate and rhythm, normal S1 and S2, no murmurs or rubs  Abdomen: soft, non-distended, non-tender. Bowel sounds normal. No masses,  no organomegaly. Extremities: Minimal edema bilateral lower extremities. atraumatic, no cyanosis  Skin: Diffuse excoriation from allergic reaction.  no jaundice  Neuro: Grossly intact, A&OX3      LABS AND IMAGING   Laboratory   Recent Labs     02/01/23 1811 02/02/23  0515   WBC 4.8 4.2   HGB 11.9* 10.1*   HCT 36.8 31.0*   MCV 86.4 87.3   *  --      Recent Labs     02/01/23 1811 02/02/23  0515   * 141   K 3.4* 3.9    109   CO2 21 23   BUN 12 9   CREATININE 0.7 0.6     Recent Labs     02/01/23  1811 02/02/23  0515   * 108*   * 146*   BILITOT 0.3 0.4   ALKPHOS 116 89     No results for input(s): LIPASE, AMYLASE in the last 72 hours. No results for input(s): PROTIME, INR in the last 72 hours. Imaging  US ABDOMEN LIMITED   Final Result   1. Mild gallbladder wall thickening and trace pericholecystic fluid without   sonographic Schaffer's sign or stones favored to be related to underlying liver   dysfunction. Acute acalculous cholecystitis is in the differential but felt   to be less likely. If there is persistent clinical suspicion for acute   cholecystitis, recommend HIDA scan. 2. Changes of diffuse liver disease. Clinical correlation advised. CT ABDOMEN PELVIS W IV CONTRAST Additional Contrast? None   Final Result   1. Moderate gallbladder wall thickening with small pericholecystic fluid. This may reflect acute cholecystitis versus reactive change to underlying   diffuse liver disease. 2. Suspected diffuse liver disease with mild splenomegaly. Clinical   correlation advised. 3. Subcutaneous fat stranding of the low anterior abdominal wall with high   density ovoid lesion versus collection in the pubic region measuring 3.1 cm,   possible hematoma. This could be better evaluated with limited ultrasound. Recommend imaging follow-up to resolution. 4. Simple cystic lesion of the left adnexa measuring 7.4 cm. Recommendation   below. 5. Scattered subcentimeter and pericentimeter short axis retroperitoneal and   upper abdominal the lymph nodes, indeterminate. RECOMMENDATIONS:   7.4 cm left adnexal simple-appearing cyst. Recommend follow-up pelvic US in   3-6 months. Reference: JACR 2020 Feb;17(2):248-254            CT CHEST PULMONARY EMBOLISM W CONTRAST   Final Result   No evidence of pulmonary embolism or aortic dissection. Small bilateral pleural effusions. Mild adjacent airspace disease is likely   passive atelectasis, but pneumonia and edema remain considerations. Cirrhotic morphology of the liver, with splenomegaly.   Upper abdominal   lymphadenopathy, incompletely evaluated. ASSESSMENT AND RECOMMENDATIONS   40 y.o. female with a PMH of Hepatitis C, restless leg syndrome, stress urinary incontinence s/p retropubic suburethral sling (11/2022) with recent removal and vaginal/abdominal exploration due to allergic reaction (1/30/23). She presented on 2/1/2023 with lower abdominal pain and worsening SOB. We have been consulted regarding elevated liver enzymes. IMPRESSION/RECOMMENDATIONS:    Elevated liver enzymes- hepatocellular pattern. appears chronic. Pt has diagnosis of untreated hepatitis C. CT and US findings suspicious for chronic liver disease. Also noted with splenomegaly and thrombocytopenia which also raises suspicion for chronic liver disease. Pt denies any recent heavy ETOH use or tylenol use. No new concerning medications. Will send off serological liver work up, viral hep panel. Pt will need outpt fibroscan study. Moniotr LFTs. Please await further input and recommendations from Dr. Brent Chen, treatment naive. Likely contracted from IVDU, she reports last use was ~9 years ago. Thrombocytopenia- Platelets 079, possible from chronic liver disease  Abdominal pain with questionable cholecystitis verse reactive changes from underlying diffuse liver disease. Gen surgery Consulted         Will discuss case with Dr. Ishaan Dubose, please await his input      If you have any questions or need any further information, please feel free to contact our consult team.  Thank you for allowing us to participate in the care of [address] [place] [address]. The note was completed using Dragon voice recognition transcription. Every effort was made to ensure accuracy; however, inadvertent transcription errors may be present despite my best efforts to edit errors.     Kisha Dhillon PA-C    Attending physician addendum:      I have personally seen and examined the patient, reviewed the patient's medical record and pertinent labs and clinical imaging. I have personally staffed the case with SEGUNDO Melo. I agree with her consultation note, exam findings, assessment and plans  as written above. I have made appropriate modifications and edited her assessment and plan where needed to reflect my impression and plans for this patient. 40 y.o. female with a PMH of Hepatitis C, restless leg syndrome, stress urinary incontinence s/p retropubic suburethral sling (11/2022) with recent removal and vaginal/abdominal exploration due to allergic reaction (1/30/23). She presented on 2/1/2023 with lower abdominal pain and worsening SOB. We have been consulted regarding elevated liver enzymes. /71   Pulse 82   Temp 99.4 °F (37.4 °C) (Oral)   Resp 16   Ht 5' 3\" (1.6 m)   Wt 197 lb 1.5 oz (89.4 kg)   LMP 01/03/2023   SpO2 94%   BMI 34.91 kg/m²      Gen- NAD  CV- RRR  Lungs CTAB  Abd- lower abd pain     CT and Labs reviewed    Impression;    1) Lower abdominal pain- POD#3 s/p urethral sling removal with vaginal/abdominal exploration due to allergic reaction (1/30/23). Left sided adnexal cyst noted on CT. ? Role of these in her pain. 2) Nonspecific gallbladder wall thickening without symptoms of cholecystitis- No classic symptoms of cholecystitis-  Can sometimes see gallbladder wall thickening in setting of chronic or acute hepatitis. Patient with some lab and imaging findings suggestive of chronic liver disease and cirrhosis. Patient with known hx of HepC and ? NIEVES and ? Cirrhosis. Low clinical suspicion for acute cholecystitis as a cause for increased LFT's (these are chronicallly elevated. 3) Hepatitis C- treatment naive with prior IVDU    4) Elevated liver enzymes with ? Cirrhosis. - Cirrhotic liver morphology and splenomegaly noted on CT. LFT's are chronically elevated since 2015. ? Chronic Hep C related. ? Superimposed NIEVES    5) Right labial hematoma- followed by urogynecology.      6) Left sided adnexal cyst noted on CT    Plan: Will send off reversible liver disease workup on the patient. Patient may need a referral for outpatient liver elastography  Will eventually need consideration for treatment of Hep C  Patient may benefit from checking vaccination status for Hep A and B  Patient may benefit from a screening endoscopy as an outpatient. Thank you for allowing me to participate in this patient's care. If there are any questions or concerns regarding this patient, or the plan we have set in place, please feel free to contact me at [phone].      Paola Angela, DO

## 2023-02-02 NOTE — H&P
Patient was seen and evaluated face-to-face per myself with student NP Ashley Johnson. Patient was admitted for abdominal pain, primarily right upper quadrant but ongoing pain in the bladder, vaginal and suprapubic region. She is postop day 3: Urethral sling extraction, vaginal exploration and abdominal exploration. Urethral sling extraction was performed due to the fact that the patient had substantial itching and reaction to the mesh implant that was placed November 9. On November 26 she started having problems with itching and reacting. She had subsequent allergen testing and was positively reactive to the mesh material that was implanted. CT abdomen and pelvis indicating evidence of cholecystitis without evidence of obstruction. She also has evidence of a left adnexa adnexal cyst 7.4 cm. She has some hematoma in the suprapubic region. Ultrasound of the gallbladder indicating pericholecystic fluid and gallbladder wall thickening. I did perform a physical exam, HPI and ROS with student. Please reference H&P note completed by student NP Agatha Merino agree with the physical exam, MDM assessment and plan.

## 2023-02-02 NOTE — PROGRESS NOTES
RN unable to assess inner vaginal incision; out of scope of practice. Per patient provider only checks that incision. Charge nurse Ayo Gabriel informed RN unable to check incision.  Scant sanguinous drainage noted coming from vaginal.

## 2023-02-02 NOTE — CARE COORDINATION
INITIAL CASE MANAGEMENT ASSESSMENT    Reviewed chart, met with patient to assess possible discharge needs. Explained Case Management role/services. Living Situation: Patient lives with significant other and her two children. She reported her significant other is caring for her children. ADLs: independent     DME: none    PT/OT Recs: not ordered at this time     Active Services: none     Transportation: active      Medications: takes on her own; [facility] on [address]    PCP: LINA Pascual CNP     PLAN/COMMENTS: Patient plans to return home and denied needs at the present time. SW/CM will follow and assist as needed.     Electronically signed by YARA Simons, HANNAH, Case Management on 2/2/2023 at 3:26 PM  [place] [phone]

## 2023-02-02 NOTE — CARE COORDINATION
02/02/23 1144   Readmission Assessment   Number of Days since last admission? 1-7 days   Previous Disposition Home with Family   Who is being Interviewed Patient   What was the patient's/caregiver's perception as to why they think they needed to return back to the hospital? Other (Comment)  (Im in so much pain I can barely move. I dont think I should of left the hospital; hard to breath)   Did you visit your Primary Care Physician after you left the hospital, before you returned this time? No   Why weren't you able to visit your PCP? Did not have an appointment   Did you see a specialist, such as Cardiac, Pulmonary, Orthopedic Physician, etc. after you left the hospital? Other (Comment)  (Called the GYN office re her pain)   Who advised the patient to return to the hospital? Self-referral   Does the patient report anything that got in the way of taking their medications? No   In our efforts to provide the best possible care to you and others like you, can you think of anything that we could have done to help you after you left the hospital the first time, so that you might not have needed to return so soon?  Other (Comment)  (nothing)

## 2023-02-02 NOTE — CONSULTS
Hospital Admission History & Physical      PCP: LINA Oliva CNP    Date of Admission: 2/1/2023    Date of Service: Pt seen/examined on 2/1/2023    Chief Complaint:      Chief Complaint   Patient presents with    Post-op Problem     States that she had surgery Monday to have mid-urethral sling removed. States she was discharged yesterday, pain has worsened since, and it's hard to breath/get comfortable when lying flat. History Of Present Illness: The patient is a 40 y.o. female who presents to [facility] with complaints of abdominal pain. She underwent removal of a suburethral sling on Monday and on Wednesday began complaining of shortness of breath. Past Medical History:        Diagnosis Date    Anxiety     Depression     Hepatitis C     asymptomatic    IV drug abuse ([address]     CLEAN SINCE 2013    Ovarian cyst     Restless leg syndrome     Urinary incontinence 11/01/2022    Wears glasses        Past Surgical History:        Procedure Laterality Date    CYSTOSCOPY N/A 11/09/2022    CYSTOSCOPY performed by Seema Iniguez MD at [address] [place] [address]  02/10/2016    diagnostic laparoscopy, KTP laser of endometriosis     TUBAL LIGATION      URETHRAL SURGERY N/A 11/09/2022    RETROPUBIC SLING performed by Seema Iniguez MD at [facility] 1/30/2023    REMOVAL OF SUBURETHRAL SLING, VAGINAL EXPLORATION, ABDOMINAL EXPLORATION performed by Seema Iniguez MD at [place] [address]       Medications Prior to Admission:    Prior to Admission medications    Medication Sig Start Date End Date Taking?  Authorizing Provider   hydrOXYzine HCl (ATARAX) 10 MG tablet Take 1 tablet by mouth 3 times daily as needed for Itching 1/31/23 2/3/23  LINA Mead CNP   ibuprofen (ADVIL;MOTRIN) 600 MG tablet Take 1 tablet by mouth every 6 hours as needed for Pain 1/31/23 2/14/23  LINA Mead CNP   oxyCODONE (ROXICODONE) 5 MG immediate release tablet Take 1 tablet by mouth every 6 hours as needed for Pain for up to 5 days. Intended supply: 5 days. Take lowest dose possible to manage pain Max Daily Amount: 20 mg 1/31/23 2/5/23  LINA Ying CNP   famotidine (PEPCID) 40 MG tablet Take 1 tablet by mouth daily 12/16/22   Deysi Hernandez MD   rOPINIRole (REQUIP) 0.5 MG tablet Take 1 tablet by mouth at bedtime 12/2/22   Kristene Gilford, APRN - CNP       Allergies:  Cobalt, Nickel, Other, and Penicillins    Social History:       reports that she quit smoking about 4 years ago. Her smoking use included cigarettes. She has a 5.00 pack-year smoking history. She has never used smokeless tobacco. She reports that she does not currently use drugs after having used the following drugs: IV. She reports that she does not drink alcohol. Family History:  Reviewed in detail and negative for DM, Early CAD, Cancer, CVA.  Positive as follows:        Problem Relation Age of Onset    Substance Abuse Mother     Mental Illness Mother     Suicide Mother     No Known Problems Father     Heart Disease Maternal Grandmother     Emphysema Maternal Grandmother     Diabetes Maternal Grandmother     Diabetes Maternal Grandfather     Heart Disease Paternal Grandmother     Pacemaker Paternal Grandmother     Diabetes Paternal Grandfather        REVIEW OF SYSTEMS:      Constitutional:  negative for fevers, chills, night sweats  Eyes:  negative for blurred vision, eye discharge, visual disturbance   HEENT:  negative for hearing loss, ear drainage,nasal congestion  Respiratory:  negative for cough, shortness of breath or hemoptysis   Cardiovascular:  negative for chest pain, palpitations, syncope  Gastrointestinal:  negative for nausea, vomiting, diarrhea, constipation, abdominal pain  Genitourinary:  negative for frequency, dysuria, urinary incontinence, hematuria  Hematologic/Lymphatic:  negative for easy bruising, bleeding and lymphadenopathy  Allergic/Immunologic:  negative for recurrent infections, angioedema, anaphylaxis   Endocrine:  negative for weight changes, polyuria, polydipsia and polyphagia  Musculoskeletal:  negative for joint  pain, swelling, decreased range of motion  Integumentary: No rashes, skin lesions  Neurological:  negative for headaches, slurred speech, unilateral weakness  Psychiatric: negative for hallucinations,confusion,agitation. PHYSICAL EXAM:    /71   Pulse 82   Temp 99.4 °F (37.4 °C) (Oral)   Resp 16   Ht 5' 3\" (1.6 m)   Wt 197 lb 1.5 oz (89.4 kg)   LMP 01/03/2023   SpO2 94%   BMI 34.91 kg/m²   Patient in bed with no apparent distress  She is tender to abdominal palpation. She does have bruising along the suprapubic area and has a right labial hematoma. LABS:      CBC   Recent Labs     02/01/23 1811 02/02/23 0515   WBC 4.8 4.2   HGB 11.9* 10.1*   HCT 36.8 31.0*   *  --       RENAL  Recent Labs     02/01/23 1811 02/02/23 0515   * 141   K 3.4* 3.9    109   CO2 21 23   BUN 12 9   CREATININE 0.7 0.6     LFT'S  Recent Labs     02/01/23 1811 02/02/23  0515   * 108*   * 146*   BILITOT 0.3 0.4   ALKPHOS 116 89     COAG  No results for input(s): INR in the last 72 hours.   CARDIAC ENZYMES  Recent Labs     02/01/23 1811   TROPONINI <0.01       U/A:    Lab Results   Component Value Date/Time    NITRITE neg 01/04/2023 02:13 PM    COLORU Yellow 02/01/2023 06:11 PM    WBCUA 42 02/01/2023 06:11 PM    RBCUA 32 02/01/2023 06:11 PM    MUCUS Present 02/01/2023 06:11 PM    BACTERIA 3+ 02/01/2023 06:11 PM    CLARITYU CLOUDY 02/01/2023 06:11 PM    SPECGRAV 1.020 02/01/2023 06:11 PM    LEUKOCYTESUR MODERATE 02/01/2023 06:11 PM    BLOODU LARGE 02/01/2023 06:11 PM    GLUCOSEU Negative 02/01/2023 06:11 PM    GLUCOSEU NEGATIVE 05/29/2012 11:35 PM    AMORPHOUS Present 02/01/2023 06:11 PM       UA:  Recent Labs     02/01/23  1811   COLORU Yellow   PHUR 5.5   WBCUA 42*   RBCUA 32*   MUCUS Present*   BACTERIA 3+*   CLARITYU CLOUDY*   SPECGRAV 1.020 LEUKOCYTESUR MODERATE*   UROBILINOGEN 1.0   BILIRUBINUR Negative   BLOODU LARGE*   GLUCOSEU Negative   KETUA Negative   AMORPHOUS Present*       Microbiology:    No results for input(s): Verlinda Flax, [facility] in the last 72 hours. Nasal Culture: No results for input(s): ORG, MRSAPCR in the last 72 hours. Blood Culture: No results for input(s): BC, BLOODCULT2, ORG in the last 72 hours. Fungal Culture:   No results for input(s): FUNGSM in the last 72 hours. No results for input(s): FUNCXBLD in the last 72 hours. CSF Culture:  No results for input(s): COLORCSF, APPEARCSF, CFTUBE, CLOTCSF, WBCCSF, RBCCSF, NEUTCSF, NUMCELLSCSF, LYMPHSCSF, MONOCSF, GLUCCSF, VOLCSF in the last 72 hours. Respiratory Culture:  No results for input(s): Alexandre Hinders in the last 72 hours. AFB:No results for input(s): AFBSMEAR in the last 72 hours. Urine Culture  No results for input(s): LABURIN in the last 72 hours. RADIOLOGY:    US ABDOMEN LIMITED   Final Result   1. Mild gallbladder wall thickening and trace pericholecystic fluid without   sonographic Schaffer's sign or stones favored to be related to underlying liver   dysfunction. Acute acalculous cholecystitis is in the differential but felt   to be less likely. If there is persistent clinical suspicion for acute   cholecystitis, recommend HIDA scan. 2. Changes of diffuse liver disease. Clinical correlation advised. CT ABDOMEN PELVIS W IV CONTRAST Additional Contrast? None   Final Result   1. Moderate gallbladder wall thickening with small pericholecystic fluid. This may reflect acute cholecystitis versus reactive change to underlying   diffuse liver disease. 2. Suspected diffuse liver disease with mild splenomegaly. Clinical   correlation advised. 3. Subcutaneous fat stranding of the low anterior abdominal wall with high   density ovoid lesion versus collection in the pubic region measuring 3.1 cm,   possible hematoma.   This could be better evaluated with limited ultrasound. Recommend imaging follow-up to resolution. 4. Simple cystic lesion of the left adnexa measuring 7.4 cm. Recommendation   below. 5. Scattered subcentimeter and pericentimeter short axis retroperitoneal and   upper abdominal the lymph nodes, indeterminate. RECOMMENDATIONS:   7.4 cm left adnexal simple-appearing cyst. Recommend follow-up pelvic US in   3-6 months. Reference: JACR 2020 Feb;17(2):248-254            CT CHEST PULMONARY EMBOLISM W CONTRAST   Final Result   No evidence of pulmonary embolism or aortic dissection. Small bilateral pleural effusions. Mild adjacent airspace disease is likely   passive atelectasis, but pneumonia and edema remain considerations. Cirrhotic morphology of the liver, with splenomegaly. Upper abdominal   lymphadenopathy, incompletely evaluated. Previous medical records personally reviewed and analyzed         ASSESSMENT/PLAN:    Active Hospital Problems    Diagnosis Date Noted    Adnexal cyst [N94.9] 02/02/2023     Priority: Medium    Abdominal pain [R10.9] 02/01/2023     Priority: Medium    Hypomagnesemia [E83.42] 02/01/2023     Priority: Medium    Thrombocytopenia ([address] [D69.6] 02/01/2023     Priority: Medium    Cholecystitis [K81.9] 02/01/2023     Priority: Medium   Claribel presents back today after being mated last night for shortness of breath and abdominal pain. On my examination she does have bruising around the Pfannenstiel incision and has a right labial hematoma. Her hemoglobin is mildly decreased from before the surgery. I do not think her abdominal pain or the swelling is related to the surgical intervention. I would recommend leaving the right labial hematoma alone for observation. Occasionally they will rupture. Rommel Currie MD  The note was completed using EMR. Every effort was made to ensure accuracy; however, inadvertent computerized transcription errors may be present.

## 2023-02-02 NOTE — H&P
HOSPITALISTS HISTORY AND PHYSICAL    2/2/2023 2:17 AM    Patient Information:  Rj Tabares is a 40 y.o. female [id number]  PCP:  LINA Klein CNP (Tel: [phone] )    Chief complaint:    Chief Complaint   Patient presents with    Post-op Problem     States that she had surgery Monday to have mid-urethral sling removed. States she was discharged yesterday, pain has worsened since, and it's hard to breath/get comfortable when lying flat. History of Present Illness:  Trip Turner is a 40 y.o. female who presented with PMHx Hepatitis C, restless leg syndrome and recent surgery on Monday for removal of suburethral sling, vaginal exploration, abdominal exploration. Presented to the ED with c/o lower abdominal pain since surgery with bruising and swelling over suprapubic area, and then Tuesday developed right upper quadrant pain. Denies nausea, vomiting, or diarrhea. Denies fever, frequency, urgency, or pain with urination. History obtained from patient. REVIEW OF SYSTEMS:   Review of Systems   Constitutional:  Positive for activity change. Negative for chills and fever. HENT: Negative. Eyes: Negative. Respiratory:  Positive for shortness of breath. Negative for cough. Cardiovascular:  Negative for chest pain. Gastrointestinal:  Positive for abdominal pain. Negative for abdominal distention, diarrhea, nausea and vomiting. Genitourinary:  Positive for pelvic pain. Negative for difficulty urinating, dysuria, frequency, hematuria and urgency. Musculoskeletal:  Negative for back pain. Skin:  Positive for color change. Bruising in suprapubic area   Neurological:  Negative for dizziness and weakness.      Past Medical History:   has a past medical history of Anxiety, Depression, Hepatitis C, IV drug abuse ([facility] [address] Ovarian cyst, Restless leg syndrome, Urinary incontinence, and Wears glasses. Past Surgical History:   has a past surgical history that includes Tubal ligation; laparoscopy (02/10/2016); Urethral Surgery (N/A, 11/09/2022); Cystoscopy (N/A, 11/09/2022); and Urethral Surgery (N/A, 1/30/2023). Medications:  No current facility-administered medications on file prior to encounter. Current Outpatient Medications on File Prior to Encounter   Medication Sig Dispense Refill    hydrOXYzine HCl (ATARAX) 10 MG tablet Take 1 tablet by mouth 3 times daily as needed for Itching 9 tablet 0    ibuprofen (ADVIL;MOTRIN) 600 MG tablet Take 1 tablet by mouth every 6 hours as needed for Pain 56 tablet 1    oxyCODONE (ROXICODONE) 5 MG immediate release tablet Take 1 tablet by mouth every 6 hours as needed for Pain for up to 5 days. Intended supply: 5 days. Take lowest dose possible to manage pain Max Daily Amount: 20 mg 20 tablet 0    famotidine (PEPCID) 40 MG tablet Take 1 tablet by mouth daily 30 tablet 3    rOPINIRole (REQUIP) 0.5 MG tablet Take 1 tablet by mouth at bedtime 90 tablet 0       Allergies: Allergies   Allergen Reactions    Crandon Rash     PER ALLERGY TESTING    Nickel Rash     PER ALLERGY TESTING    Other Itching and Rash     MESH FROM SLING    Penicillins Hives        Social History:     reports that she quit smoking about 4 years ago. Her smoking use included cigarettes. She has a 5.00 pack-year smoking history. She has never used smokeless tobacco. She reports that she does not currently use drugs after having used the following drugs: IV. She reports that she does not drink alcohol.      Family History:  family history includes Diabetes in her maternal grandfather, maternal grandmother, and paternal grandfather; Emphysema in her maternal grandmother; Heart Disease in her maternal grandmother and paternal grandmother; Mental Illness in her mother; No Known Problems in her father; Pacemaker in her paternal grandmother; Substance Abuse in her mother; Suicide in her mother. Physical Exam:  /70   Pulse 98   Temp 98.7 °F (37.1 °C) (Oral)   Resp 16   Ht 5' 3\" (1.6 m)   Wt 197 lb 1.5 oz (89.4 kg)   LMP 01/03/2023   SpO2 99%   BMI 34.91 kg/m²     Physical Exam  Vitals and nursing note reviewed. Constitutional:       Appearance: Normal appearance. Comments: Patient seems distressed because of pain   HENT:      Head: Normocephalic and atraumatic. Eyes:      Conjunctiva/sclera: Conjunctivae normal.   Cardiovascular:      Rate and Rhythm: Normal rate and regular rhythm. Heart sounds: Normal heart sounds. Pulmonary:      Effort: Pulmonary effort is normal.      Breath sounds: Normal breath sounds. Abdominal:      General: Bowel sounds are normal.      Tenderness: There is abdominal tenderness in the right upper quadrant and right lower quadrant. There is guarding. There is no right CVA tenderness or left CVA tenderness. Comments: RUQ painful to touch. Low transverse incision well approximated. Bruising and swelling present. Anatomical chart indicates area of surgical ecchymosis. Horizontal suprapubic scar. Approximated with skin adhesive. Ecchymosis extends into the labia majora. Genitourinary:     Comments: Ecchymosis and swelling in suprapubic area and labia. Musculoskeletal:         General: No swelling. Normal range of motion. Skin:     General: Skin is warm and dry. Capillary Refill: Capillary refill takes less than 2 seconds. Findings: Bruising present. Neurological:      General: No focal deficit present. Mental Status: She is alert and oriented to person, place, and time. Psychiatric:         Mood and Affect: Mood normal.         Thought Content:  Thought content normal.       Labs:  CBC:   Lab Results   Component Value Date/Time    WBC 4.8 02/01/2023 06:11 PM    RBC 4.26 02/01/2023 06:11 PM    HGB 11.9 02/01/2023 06:11 PM    HCT 36.8 02/01/2023 06:11 PM    MCV 86.4 02/01/2023 06:11 PM    MCH 28.0 02/01/2023 06:11 PM    MCHC 32.4 02/01/2023 06:11 PM    RDW 14.3 02/01/2023 06:11 PM     02/01/2023 06:11 PM    MPV 8.1 02/01/2023 06:11 PM     BMP:    Lab Results   Component Value Date/Time     02/01/2023 06:11 PM    K 3.4 02/01/2023 06:11 PM     02/01/2023 06:11 PM    CO2 21 02/01/2023 06:11 PM    BUN 12 02/01/2023 06:11 PM    CREATININE 0.7 02/01/2023 06:11 PM    CALCIUM 8.9 02/01/2023 06:11 PM    GFRAA >60 01/12/2022 10:14 AM    GFRAA >60 01/18/2012 11:36 PM    LABGLOM >60 02/01/2023 06:11 PM    GLUCOSE 95 02/01/2023 06:11 PM     US ABDOMEN LIMITED   Final Result   1. Mild gallbladder wall thickening and trace pericholecystic fluid without   sonographic Schaffer's sign or stones favored to be related to underlying liver   dysfunction. Acute acalculous cholecystitis is in the differential but felt   to be less likely. If there is persistent clinical suspicion for acute   cholecystitis, recommend HIDA scan. 2. Changes of diffuse liver disease. Clinical correlation advised. CT ABDOMEN PELVIS W IV CONTRAST Additional Contrast? None   Final Result   1. Moderate gallbladder wall thickening with small pericholecystic fluid. This may reflect acute cholecystitis versus reactive change to underlying   diffuse liver disease. 2. Suspected diffuse liver disease with mild splenomegaly. Clinical   correlation advised. 3. Subcutaneous fat stranding of the low anterior abdominal wall with high   density ovoid lesion versus collection in the pubic region measuring 3.1 cm,   possible hematoma. This could be better evaluated with limited ultrasound. Recommend imaging follow-up to resolution. 4. Simple cystic lesion of the left adnexa measuring 7.4 cm. Recommendation   below. 5. Scattered subcentimeter and pericentimeter short axis retroperitoneal and   upper abdominal the lymph nodes, indeterminate.       RECOMMENDATIONS:   7.4 cm left adnexal simple-appearing cyst. Recommend follow-up pelvic US in   3-6 months. Reference: JACR 2020 Feb;17(2):248-254            CT CHEST PULMONARY EMBOLISM W CONTRAST   Final Result   No evidence of pulmonary embolism or aortic dissection. Small bilateral pleural effusions. Mild adjacent airspace disease is likely   passive atelectasis, but pneumonia and edema remain considerations. Cirrhotic morphology of the liver, with splenomegaly. Upper abdominal   lymphadenopathy, incompletely evaluated. Discussed case  with Sandra He CNP    Problem List  Principal Problem:    Abdominal pain  Active Problems:    Hypomagnesemia    Thrombocytopenia (HCC)    Cholecystitis  Resolved Problems:    * No resolved hospital problems. *        Assessment/Plan:   Abdominal pain right upper quadrant pain/tenderness. CT abd showed moderate gallbladder wall thickening with small pericholecystic fluid. Surgery consulted in ED-> to see patient in AM  Keep NPO  Continue levaquin and flagyl  Dilaudid prn for pain    Hypomagnesemia Magnesium level 1.50  Replace magnesium    Thrombocytopenia-platelets 006  ? Reactive to allergic reaction to mesh vs stress of surgery  January 5, 2023 platelet count 689  Check daily CBC    Elevated ALT and AST-chronic likely related to hepatitis C history    Abnormal urinalysis-Urinalysis with moderate leukocytes, 3+ bacteria, WBC 42, epithelial cells 16. Denies painful urination, frequency or urgency, afebrile. Received one dose Cipro in ED. Urine was reflexed to culture. ? Due to recent vaginal surgery. /urethral sling removed  UTI unlikely due to asymptomatic,       DVT prophylaxis: Lovenox  Code status: Full Code  Diet: NPO  IV access: Peripheral  Yepez Catheter: None    Admit as inpatient. I anticipate hospitalization spanning more than two midnights for investigation and treatment of the above medically necessary diagnoses.     Please note that some part of this chart was generated using Dragon dictation software. Although every effort was made to ensure the accuracy of this automated transcription, some errors in transcription may have occurred inadvertently. If you may need any clarification, please do not hesitate to contact me through [facility].        Johnathon MILLS student  2/2/2023 2:17 AM

## 2023-02-02 NOTE — PROGRESS NOTES
Clinical Pharmacy Note  Renal Dose Adjustment    Claribel Alvarez is receiving levofloxacin. This medication is renally eliminated. Based on the patient's Estimated Creatinine Clearance: 117 mL/min (based on SCr of 0.7 mg/dL). and indication of intraabdominal infection, the dose has been adjusted to levofloxacin 750mg IV Q24H per protocol. Pharmacy will continue to monitor and adjust dose as needed for changes in renal function.       Robel Serrano, PharmD  2/1/2023 11:55 PM

## 2023-02-02 NOTE — PROGRESS NOTES
RN reached out to provider regarding 12 lead EKG NOT being completed in the ED to see if she still wanted it completed due to RT stating the provider would need to be reorder 12 lead ekg. Provider on call Nasrin Callejas discontinued the order.

## 2023-02-02 NOTE — ED PROVIDER NOTES
Attending Note:    The patient was seen and examined by the mid-level provider. I also completed a face-to-face examination. HPI: Chiquita Minaya is a 40 y.o. female who presents to the emergency department the complaint of lower abdominal pain since her surgery. She reports that over the last yesterday evening she has developed pain in the right upper quadrant. She had some heartburn last night that felt like a burning sensation coming up into her chest.  That has since resolved. She describes constant sharp pain in the right upper quadrant and right flank area for the last 48 hours. She had chills at home but denies any documented fever. She denies any dysuria hematuria frequency urgency. She denies any vaginal bleeding or discharge. The patient was recently admitted to the hospital on January 30 and discharged on January 31 yesterday. She underwent removal of suburethral sling, vaginal exploration, abdominal exploration by Dr. Mook Martino, urogynecology. Physical Exam:     Constitutional: No apparent distress. Appears comfortable. Head: No visible evidence of trauma. Normocephalic. Eyes: Pupils equal and reactive. No photophobia. Conjunctiva normal.    HENT: Oral mucosa moist.  Airway patent. Neck:  Soft and supple. Nontender. Heart:  Regular rate and rhythm. No murmur. Lungs:  Clear to auscultation. No wheezes, rales, or ronchi. No conversational dyspnea or accessory muscle use. Abdomen:  Soft, non-distended. Acutely tender in the right upper quadrant on exam.  No CVA tenderness. Mild vague discomfort in the lower abdomen. Low transverse incision was well approximated. Some bruising/ecchymosis noted to the suprapubic area extending into the right labia. Please refer to photo documentation by the physician assistant and the physician assistant's note. No guarding rigidity or rebound. Musculoskeletal: Extremities non-tender with full range of motion.   Radial and dorsalis pedis pulses are equal laterally. No calf tenderness erythema or edema. Neurological: Alert and oriented x 3. Speech clear. No acute focal motor or sensory deficits. Skin: Skin is warm and dry. No rash. Psychiatric: Normal mood and affect. Behavior is normal.     DIAGNOSTIC RESULTS     EKG: All EKG's are interpreted by the Emergency Department Physician who either signs or Co-signs this chart in the absence of a cardiologist.        RADIOLOGY:   Non-plain film images such as CT, Ultrasound and MRI are read by the radiologist. Plain radiographic images are visualized and preliminarily interpreted by the emergency physician with the below findings:        Interpretation per the Radiologist below, if available at the time of this note:    US ABDOMEN LIMITED   Final Result   1. Mild gallbladder wall thickening and trace pericholecystic fluid without   sonographic Schaffer's sign or stones favored to be related to underlying liver   dysfunction. Acute acalculous cholecystitis is in the differential but felt   to be less likely. If there is persistent clinical suspicion for acute   cholecystitis, recommend HIDA scan. 2. Changes of diffuse liver disease. Clinical correlation advised. CT ABDOMEN PELVIS W IV CONTRAST Additional Contrast? None   Final Result   1. Moderate gallbladder wall thickening with small pericholecystic fluid. This may reflect acute cholecystitis versus reactive change to underlying   diffuse liver disease. 2. Suspected diffuse liver disease with mild splenomegaly. Clinical   correlation advised. 3. Subcutaneous fat stranding of the low anterior abdominal wall with high   density ovoid lesion versus collection in the pubic region measuring 3.1 cm,   possible hematoma. This could be better evaluated with limited ultrasound. Recommend imaging follow-up to resolution. 4. Simple cystic lesion of the left adnexa measuring 7.4 cm. Recommendation   below.    5. Scattered subcentimeter and pericentimeter short axis retroperitoneal and   upper abdominal the lymph nodes, indeterminate. RECOMMENDATIONS:   7.4 cm left adnexal simple-appearing cyst. Recommend follow-up pelvic US in   3-6 months. Reference: JACR 2020 Feb;17(2):248-254            CT CHEST PULMONARY EMBOLISM W CONTRAST   Final Result   No evidence of pulmonary embolism or aortic dissection. Small bilateral pleural effusions. Mild adjacent airspace disease is likely   passive atelectasis, but pneumonia and edema remain considerations. Cirrhotic morphology of the liver, with splenomegaly. Upper abdominal   lymphadenopathy, incompletely evaluated.                ED BEDSIDE ULTRASOUND:   Performed by ED Physician - none    LABS:  Labs Reviewed   URINALYSIS WITH REFLEX TO CULTURE - Abnormal; Notable for the following components:       Result Value    Clarity, UA CLOUDY (*)     Blood, Urine LARGE (*)     Protein, UA 30 (*)     Leukocyte Esterase, Urine MODERATE (*)     All other components within normal limits   CBC WITH AUTO DIFFERENTIAL - Abnormal; Notable for the following components:    Hemoglobin 11.9 (*)     Platelets 290 (*)     All other components within normal limits   COMPREHENSIVE METABOLIC PANEL W/ REFLEX TO MG FOR LOW K - Abnormal; Notable for the following components:    Sodium 135 (*)     Potassium reflex Magnesium 3.4 (*)     Albumin 3.3 (*)     Albumin/Globulin Ratio 1.0 (*)      (*)      (*)     All other components within normal limits   BRAIN NATRIURETIC PEPTIDE - Abnormal; Notable for the following components:    Pro- (*)     All other components within normal limits   MICROSCOPIC URINALYSIS - Abnormal; Notable for the following components:    Bacteria, UA 3+ (*)     WBC, UA 42 (*)     RBC, UA 32 (*)     Epithelial Cells, UA 16 (*)     Amorphous, UA Present (*)     Mucus, UA Present (*)     All other components within normal limits   MAGNESIUM - Abnormal; Notable for the following components:    Magnesium 1.50 (*)     All other components within normal limits   CULTURE, URINE   LACTATE, SEPSIS   TROPONIN   PREGNANCY, URINE   LACTATE, SEPSIS       All other labs were within normal range or not returned as of this dictation. EMERGENCY DEPARTMENT COURSE and DIFFERENTIAL DIAGNOSIS/MDM:   Vitals:    Vitals:    02/01/23 2045 02/01/23 2100 02/01/23 2229 02/01/23 2259   BP: 106/60 (!) 105/56  115/70   Pulse: 88 90 79 98   Resp: 14 15 15 16   Temp:    98.7 °F (37.1 °C)   TempSrc:    Oral   SpO2: 99% 100% 100% 99%   Weight:       Height:               MDM        I personally saw and performed a substantive portion of the visit including all aspects of the medical decision making. Patient presents with right upper quadrant abdominal pain as noted above. She is afebrile but has reported some chills at home. She is hemodynamically stable. She is acutely tender in the right upper quadrant. No CVA tenderness. There is some mild vague discomfort in the lower abdomen with some ecchymosis/bruising to her surgical site but no purulent drainage. Urinalysis reveals moderate leukocytes with 3+ bacteria and 42 white cells. She was given Cipro 400 mg IV. Lactate is normal at 1.4. White blood cell count 4.8. Liver function tests are mildly elevated with an ALT of 168 and an AST of 111. She has had recent elevations in her liver enzymes. Total bilirubin is 0.3. CT abdomen and pelvis with IV contrast was reviewed which reveals moderate gallbladder wall thickening and small amount of pericholecystic fluid suspicious for acute cholecystitis. Stranding in the lower abdominal wall consistent with postoperative changes was noted. Small hematoma was noted as well. However there is no significant lower abdominal tenderness on exam.    CT chest PE protocol reveals no evidence of pulmonary embolus. Small bilateral pleural effusions. Some atelectasis was also noted.     Patient will be admitted for further treatment and evaluation. General surgery was consulted by the physician assistant. Hospitalist was contacted by the physician assistant    CRITICAL CARE TIME     I personally saw the patient and independently provided 0 minutes of non-concurrent critical care out of the total shared critical care time provided. This excludes separately reportable procedures. There was a high probability of clinically significant/life threatening deterioration in the patient's condition which required my urgent intervention. CONSULTS:  IP CONSULT TO GENERAL SURGERY    PROCEDURES:  Unless otherwise noted below, none     Procedures        FINAL IMPRESSION      1. Acute cholecystitis    2. Elevated LFTs    3. Postoperative hematoma of subcutaneous tissue following non-dermatologic procedure          DISPOSITION/PLAN   DISPOSITION Admitted 02/01/2023 10:00:28 PM      PATIENT REFERRED TO:  No follow-up provider specified. DISCHARGE MEDICATIONS:  Current Discharge Medication List        Controlled Substances Monitoring:     No flowsheet data found. (Please note that portions of this note were completed with a voice recognition program.  Efforts were made to edit the dictations but occasionally words are mis-transcribed. )    1859 Ji Wills DO (electronically signed)  Attending Emergency Physician       Macey Puri DO  02/01/23 4404

## 2023-02-03 LAB
ANTI-NUCLEAR ANTIBODY (ANA): NEGATIVE
HAV AB SERPL IA-ACNC: POSITIVE
HBV SURFACE AB TITR SER: <3.5 MIU/ML
TISSUE TRANSGLUTAMINASE IGA: 1.1 U/ML (ref 0–14)

## 2023-02-03 PROCEDURE — 6370000000 HC RX 637 (ALT 250 FOR IP): Performed by: FAMILY MEDICINE

## 2023-02-03 PROCEDURE — 87522 HEPATITIS C REVRS TRNSCRPJ: CPT

## 2023-02-03 PROCEDURE — 94760 N-INVAS EAR/PLS OXIMETRY 1: CPT

## 2023-02-03 PROCEDURE — 05HB33Z INSERTION OF INFUSION DEVICE INTO RIGHT BASILIC VEIN, PERCUTANEOUS APPROACH: ICD-10-PCS | Performed by: FAMILY MEDICINE

## 2023-02-03 PROCEDURE — 6360000002 HC RX W HCPCS: Performed by: NURSE PRACTITIONER

## 2023-02-03 PROCEDURE — C1751 CATH, INF, PER/CENT/MIDLINE: HCPCS

## 2023-02-03 PROCEDURE — 2500000003 HC RX 250 WO HCPCS: Performed by: NURSE PRACTITIONER

## 2023-02-03 PROCEDURE — 1200000000 HC SEMI PRIVATE

## 2023-02-03 PROCEDURE — 36415 COLL VENOUS BLD VENIPUNCTURE: CPT

## 2023-02-03 PROCEDURE — 2580000003 HC RX 258: Performed by: FAMILY MEDICINE

## 2023-02-03 PROCEDURE — 6360000002 HC RX W HCPCS: Performed by: FAMILY MEDICINE

## 2023-02-03 PROCEDURE — 6370000000 HC RX 637 (ALT 250 FOR IP): Performed by: NURSE PRACTITIONER

## 2023-02-03 PROCEDURE — 36569 INSJ PICC 5 YR+ W/O IMAGING: CPT

## 2023-02-03 RX ORDER — LIDOCAINE HYDROCHLORIDE 10 MG/ML
5 INJECTION, SOLUTION EPIDURAL; INFILTRATION; INTRACAUDAL; PERINEURAL ONCE
Status: DISCONTINUED | OUTPATIENT
Start: 2023-02-03 | End: 2023-02-07 | Stop reason: HOSPADM

## 2023-02-03 RX ORDER — SODIUM CHLORIDE 0.9 % (FLUSH) 0.9 %
5-40 SYRINGE (ML) INJECTION EVERY 12 HOURS SCHEDULED
Status: DISCONTINUED | OUTPATIENT
Start: 2023-02-03 | End: 2023-02-03 | Stop reason: SDUPTHER

## 2023-02-03 RX ORDER — OXYCODONE HYDROCHLORIDE 5 MG/1
5 TABLET ORAL ONCE
Status: COMPLETED | OUTPATIENT
Start: 2023-02-03 | End: 2023-02-03

## 2023-02-03 RX ORDER — SODIUM CHLORIDE 0.9 % (FLUSH) 0.9 %
5-40 SYRINGE (ML) INJECTION PRN
Status: DISCONTINUED | OUTPATIENT
Start: 2023-02-03 | End: 2023-02-03 | Stop reason: SDUPTHER

## 2023-02-03 RX ORDER — SODIUM CHLORIDE 9 MG/ML
25 INJECTION, SOLUTION INTRAVENOUS PRN
Status: DISCONTINUED | OUTPATIENT
Start: 2023-02-03 | End: 2023-02-03 | Stop reason: SDUPTHER

## 2023-02-03 RX ADMIN — CEFTRIAXONE 1000 MG: 1 INJECTION, POWDER, FOR SOLUTION INTRAMUSCULAR; INTRAVENOUS at 14:05

## 2023-02-03 RX ADMIN — HYDROMORPHONE HYDROCHLORIDE 1 MG: 1 INJECTION, SOLUTION INTRAMUSCULAR; INTRAVENOUS; SUBCUTANEOUS at 05:04

## 2023-02-03 RX ADMIN — ONDANSETRON 4 MG: 4 TABLET, ORALLY DISINTEGRATING ORAL at 09:18

## 2023-02-03 RX ADMIN — METRONIDAZOLE 500 MG: 500 INJECTION, SOLUTION INTRAVENOUS at 21:16

## 2023-02-03 RX ADMIN — DIPHENHYDRAMINE HYDROCHLORIDE 12.5 MG: 50 INJECTION, SOLUTION INTRAMUSCULAR; INTRAVENOUS at 12:33

## 2023-02-03 RX ADMIN — DIPHENHYDRAMINE HYDROCHLORIDE 12.5 MG: 50 INJECTION, SOLUTION INTRAMUSCULAR; INTRAVENOUS at 21:09

## 2023-02-03 RX ADMIN — HYDROMORPHONE HYDROCHLORIDE 1 MG: 1 INJECTION, SOLUTION INTRAMUSCULAR; INTRAVENOUS; SUBCUTANEOUS at 00:53

## 2023-02-03 RX ADMIN — PANTOPRAZOLE SODIUM 40 MG: 40 TABLET, DELAYED RELEASE ORAL at 05:05

## 2023-02-03 RX ADMIN — HYDROMORPHONE HYDROCHLORIDE 1 MG: 1 INJECTION, SOLUTION INTRAMUSCULAR; INTRAVENOUS; SUBCUTANEOUS at 16:48

## 2023-02-03 RX ADMIN — DIPHENHYDRAMINE HYDROCHLORIDE 12.5 MG: 50 INJECTION, SOLUTION INTRAMUSCULAR; INTRAVENOUS at 05:05

## 2023-02-03 RX ADMIN — METRONIDAZOLE 500 MG: 500 INJECTION, SOLUTION INTRAVENOUS at 12:38

## 2023-02-03 RX ADMIN — HYDROMORPHONE HYDROCHLORIDE 1 MG: 1 INJECTION, SOLUTION INTRAMUSCULAR; INTRAVENOUS; SUBCUTANEOUS at 12:33

## 2023-02-03 RX ADMIN — HYDROMORPHONE HYDROCHLORIDE 1 MG: 1 INJECTION, SOLUTION INTRAMUSCULAR; INTRAVENOUS; SUBCUTANEOUS at 21:09

## 2023-02-03 RX ADMIN — OXYCODONE 5 MG: 5 TABLET ORAL at 10:13

## 2023-02-03 ASSESSMENT — PAIN SCALES - GENERAL
PAINLEVEL_OUTOF10: 9
PAINLEVEL_OUTOF10: 10
PAINLEVEL_OUTOF10: 8
PAINLEVEL_OUTOF10: 8
PAINLEVEL_OUTOF10: 9
PAINLEVEL_OUTOF10: 10
PAINLEVEL_OUTOF10: 4
PAINLEVEL_OUTOF10: 5
PAINLEVEL_OUTOF10: 10
PAINLEVEL_OUTOF10: 6

## 2023-02-03 ASSESSMENT — PAIN DESCRIPTION - ORIENTATION
ORIENTATION: MID
ORIENTATION: MID

## 2023-02-03 ASSESSMENT — PAIN DESCRIPTION - DESCRIPTORS
DESCRIPTORS: ACHING
DESCRIPTORS: ACHING
DESCRIPTORS: ACHING;DISCOMFORT
DESCRIPTORS: ACHING;DISCOMFORT

## 2023-02-03 ASSESSMENT — PAIN DESCRIPTION - FREQUENCY
FREQUENCY: CONTINUOUS

## 2023-02-03 ASSESSMENT — PAIN DESCRIPTION - ONSET
ONSET: ON-GOING

## 2023-02-03 ASSESSMENT — PAIN - FUNCTIONAL ASSESSMENT
PAIN_FUNCTIONAL_ASSESSMENT: ACTIVITIES ARE NOT PREVENTED
PAIN_FUNCTIONAL_ASSESSMENT: PREVENTS OR INTERFERES SOME ACTIVE ACTIVITIES AND ADLS
PAIN_FUNCTIONAL_ASSESSMENT: PREVENTS OR INTERFERES SOME ACTIVE ACTIVITIES AND ADLS
PAIN_FUNCTIONAL_ASSESSMENT: ACTIVITIES ARE NOT PREVENTED

## 2023-02-03 ASSESSMENT — PAIN DESCRIPTION - PAIN TYPE
TYPE: ACUTE PAIN

## 2023-02-03 ASSESSMENT — PAIN DESCRIPTION - LOCATION
LOCATION: ABDOMEN
LOCATION: ABDOMEN;VAGINA
LOCATION: ABDOMEN;VAGINA
LOCATION: ABDOMEN

## 2023-02-03 ASSESSMENT — PAIN SCALES - WONG BAKER
WONGBAKER_NUMERICALRESPONSE: 0
WONGBAKER_NUMERICALRESPONSE: 0
WONGBAKER_NUMERICALRESPONSE: 2

## 2023-02-03 NOTE — PROGRESS NOTES
Claribel reports continued discomfort from vulva radiating to RUQ of abdomen. Continues to use ice. Bruising remains present at suprapubic incision. Right labial hematoma remains tender. No intervention necessary. She will follow up in office upon discharge.   LINA Mead - CNP

## 2023-02-03 NOTE — PROGRESS NOTES
INPATIENT PROGRESS NOTE        IDENTIFYING DATA/REASON FOR CONSULTATION   PATIENT:  Enriqueta Sharif  MRN:  [id number]  ADMIT DATE: 2023  TIME OF EVALUATION: 2/3/2023 10:57 AM  HOSPITAL STAY:   LOS: 2 days   CONSULTING PHYSICIAN: Jackie Escamilla DO   REASON FOR CONSULTATION: Elevated liver enzymes    Subjective:    Patient seen in follow-up. She has ongoing abdominal pain at incision site that migrates up her abdomen. MEDICATIONS   SCHEDULED:  lidocaine 1 % injection, 5 mL, Once  sodium chloride flush, 5-40 mL, 2 times per day  pantoprazole, 40 mg, QAM AC  cefTRIAXone (ROCEPHIN) IV, 1,000 mg, Q24H  sodium chloride flush, 5-40 mL, 2 times per day  metroNIDAZOLE, 500 mg, Q8H  rOPINIRole, 0.5 mg, Nightly      FLUIDS/DRIPS:     sodium chloride      sodium chloride       PRNs: sodium chloride flush, 5-40 mL, PRN  sodium chloride, 25 mL, PRN  perflutren lipid microspheres, 1.5 mL, ONCE PRN  calcium carbonate, 500 mg, TID PRN  sodium chloride flush, 5-40 mL, PRN  sodium chloride, , PRN  ondansetron, 4 mg, Q8H PRN   Or  ondansetron, 4 mg, Q6H PRN  polyethylene glycol, 17 g, Daily PRN  acetaminophen, 650 mg, Q6H PRN   Or  acetaminophen, 650 mg, Q6H PRN  HYDROmorphone, 1 mg, Q4H PRN  diphenhydrAMINE, 12.5 mg, Q6H PRN  magnesium sulfate, 2,000 mg, PRN      ALLERGIES:    Allergies   Allergen Reactions    [place] Rash     PER ALLERGY TESTING    Nickel Rash     PER ALLERGY TESTING    Other Itching and Rash     MESH FROM SLING    Penicillins Hives         PHYSICAL EXAM   VITALS:  /69   Pulse 73   Temp 98.6 °F (37 °C) (Oral)   Resp 16   Ht 5' 3\" (1.6 m)   Wt 193 lb 5.5 oz (87.7 kg)   SpO2 92%   BMI 34.25 kg/m²   TEMPERATURE:  Current - Temp: 98.6 °F (37 °C);  Max - Temp  Av.6 °F (37 °C)  Min: 98.3 °F (36.8 °C)  Max: 98.8 °F (37.1 °C)    Physical Exam:  General appearance: mild discomfort, appears stated age  Eyes: Anicteric  Head: Normocephalic, without obvious abnormality  Lungs: clear to auscultation bilaterally, Normal Effort  Heart: regular rate and rhythm, normal S1 and S2, no murmurs or rubs  Abdomen: soft, mild tenderness   Extremities: Minimal edema bilateral lower extremities. atraumatic, no cyanosis  Skin: Diffuse excoriation from allergic reaction. no jaundice  Neuro: Grossly intact, A&OX3    LABS AND IMAGING   Laboratory   Recent Labs     02/01/23 1811 02/02/23  0515   WBC 4.8 4.2   HGB 11.9* 10.1*   HCT 36.8 31.0*   MCV 86.4 87.3   * 97*     Recent Labs     02/01/23 1811 02/02/23  0515   * 141   K 3.4* 3.9    109   CO2 21 23   BUN 12 9   CREATININE 0.7 0.6     Recent Labs     02/01/23 1811 02/02/23  0515   * 108*   * 146*   BILITOT 0.3 0.4   ALKPHOS 116 89     No results for input(s): LIPASE, AMYLASE in the last 72 hours. No results for input(s): PROTIME, INR in the last 72 hours. Imaging  US ABDOMEN LIMITED   Final Result   1. Mild gallbladder wall thickening and trace pericholecystic fluid without   sonographic Schaffer's sign or stones favored to be related to underlying liver   dysfunction. Acute acalculous cholecystitis is in the differential but felt   to be less likely. If there is persistent clinical suspicion for acute   cholecystitis, recommend HIDA scan. 2. Changes of diffuse liver disease. Clinical correlation advised. CT ABDOMEN PELVIS W IV CONTRAST Additional Contrast? None   Final Result   1. Moderate gallbladder wall thickening with small pericholecystic fluid. This may reflect acute cholecystitis versus reactive change to underlying   diffuse liver disease. 2. Suspected diffuse liver disease with mild splenomegaly. Clinical   correlation advised. 3. Subcutaneous fat stranding of the low anterior abdominal wall with high   density ovoid lesion versus collection in the pubic region measuring 3.1 cm,   possible hematoma. This could be better evaluated with limited ultrasound.    Recommend imaging follow-up to resolution. 4. Simple cystic lesion of the left adnexa measuring 7.4 cm. Recommendation   below. 5. Scattered subcentimeter and pericentimeter short axis retroperitoneal and   upper abdominal the lymph nodes, indeterminate. RECOMMENDATIONS:   7.4 cm left adnexal simple-appearing cyst. Recommend follow-up pelvic US in   3-6 months. Reference: JACR 2020 Feb;17(2):248-254            CT CHEST PULMONARY EMBOLISM W CONTRAST   Final Result   No evidence of pulmonary embolism or aortic dissection. Small bilateral pleural effusions. Mild adjacent airspace disease is likely   passive atelectasis, but pneumonia and edema remain considerations. Cirrhotic morphology of the liver, with splenomegaly. Upper abdominal   lymphadenopathy, incompletely evaluated. Endoscopy      ASSESSMENT AND RECOMMENDATIONS   40 y.o. female with a PMH of Hepatitis C, restless leg syndrome, stress urinary incontinence s/p retropubic suburethral sling (11/2022) with recent removal and vaginal/abdominal exploration due to allergic reaction (1/30/23). She presented on 2/1/2023 with lower abdominal pain and worsening SOB. We have been consulted regarding elevated liver enzymes. IMPRESSION/RECOMMENDATIONS:     Elevated liver enzymes- hepatocellular pattern. appears chronic. Patient with history of untreated hepatitis C. CT and US findings suspicious for chronic liver disease. Also noted with splenomegaly and thrombocytopenia which also raises suspicion for chronic liver disease. She denies any recent heavy ETOH use or tylenol use. No new concerning medications. Full serological liver work up is pending. GENOVEVA negative. Normal iron studies. Normal celiac serologies. She will need outpt fibroscan study. Continue to monitor LFTs. Chronic HCV, treatment naive. Likely contracted from IVDU, she reports last use was ~9 years ago. She will benefit from outpatient treatment.    Thrombocytopenia- Platelets 462, possible from chronic liver disease  Abdominal pain likely due to reactive changes from underlying diffuse liver disease. Low suspicion for cholecystitis. Seen by general surgery who has no plans for cholecystectomy at this time. If you have any questions or need any further information, please feel free to contact us [phone]. Thank you for allowing us to participate in the care of [address] [place] [address]. The note was completed using Dragon voice recognition transcription. Every effort was made to ensure accuracy; however, inadvertent transcription errors may be present despite my best efforts to edit errors. Tigist Alvarez PA-C     Attending physician addendum:      I have personally seen and examined the patient, reviewed the patient's medical record and pertinent labs and clinical imaging. I have personally staffed the case with Tigist Alvarez PA-C. I agree with her consultation note, exam findings, assessment and plans  as written above. I have made appropriate modifications and edited her assessment and plan where needed to reflect my impression and plans for this patient. Patient still with lower abdominal pain. No N/V or RUQ pain     /69   Pulse 73   Temp 98.6 °F (37 °C) (Oral)   Resp 16   Ht 5' 3\" (1.6 m)   Wt 193 lb 5.5 oz (87.7 kg)   SpO2 92%   BMI 34.25 kg/m²      Gen- NAD  CV- RRR  Lungs CTAB  Abd- lower abd pain      CT and Labs reviewed     Impression;     1) Lower abdominal pain- POD#4 s/p urethral sling removal with vaginal/abdominal exploration due to allergic reaction (1/30/23). Left sided adnexal cyst noted on CT. ? Role of these in her pain. 2) Nonspecific gallbladder wall thickening without symptoms of cholecystitis- No classic symptoms of cholecystitis-  Suspect that the gallbladder wall thickening is related to chronic hepatitis with ? cirrhosis. Patient with some lab and imaging findings suggestive of chronic liver disease and cirrhosis.  Patient with known hx of HepC and ? NIEVES and ? Cirrhosis. Low clinical suspicion for acute cholecystitis as a cause for increased LFT's (these are chronicallly elevated. 3) Hepatitis C- treatment naive with prior IVDU     4) Elevated liver enzymes with ? Cirrhosis. - Cirrhotic liver morphology and splenomegaly noted on CT. LFT's are chronically elevated since 2015. ? Chronic Hep C related. ? Superimposed NIEVES     5) Right labial hematoma- followed by urogynecology. 6) Left sided adnexal cyst noted on CT     Plan:  Pain management per GYN  Await reversible liver disease workup on the patient. Patient may need a referral for outpatient liver elastography  Will eventually need consideration for treatment of Hep C  Patient may benefit from checking vaccination status for Hep A and B  Patient may benefit from a screening endoscopy as an outpatient. No other additional inpatient workup is planned. Call with ? Related to liver. Thank you for allowing me to participate in this patient's care. If there are any questions or concerns regarding this patient, or the plan we have set in place, please feel free to contact me at [phone].      Sandor Gary, DO

## 2023-02-03 NOTE — CARE COORDINATION
Aware patient has a PICC line and on IV ceftriaxone. Requested that Nettie at [facility] check patient's infusion benefits. Spoke with patient regarding possible home IV therapy and provided her with a home care list.    The Plan for Transition of Care is related to the following treatment goals: home    The Patient and/or patient representative  was provided with a choice of provider and agrees   with the discharge plan. [x] Yes [] No    Freedom of choice list was provided with basic dialogue that supports the patient's individualized plan of care/goals, treatment preferences and shares the quality data associated with the providers.  [x] Yes [] No     Electronically signed by YARA Dong, CRISSW, Case Management on 2/3/2023 at 4:51 PM  [place] [phone]

## 2023-02-03 NOTE — PROGRESS NOTES
Patient's IV infiltrated at this time. Infusion of flagyl stopped. Charge nurse to try ultrasound guided IV.

## 2023-02-03 NOTE — PROGRESS NOTES
Patient alert and oriented x4, VSS. Pain to abdomen and tasneem area where there is continued swelling and bruising. Medicated per MAR. Continued itching to these sites as well, IV benadryl given. Patient tolerating ambulation well and voiding adequately. Minimal bloody drainage from surgical site.

## 2023-02-03 NOTE — PLAN OF CARE
Problem: Pain  Goal: Verbalizes/displays adequate comfort level or baseline comfort level  Outcome: Progressing   Numeric pain rating scale being used. Patient repositioned for comfort. Ice applied. Patient is tolerating IV pain medicine. Problem: Safety - Adult  Goal: Free from fall injury  Outcome: Progressing   Patient has remained free of falls. 2/4 bed rails up, bed locked and in lowest position, call light within reach. Patient instructed on use of call light and uses appropriately. Non-skid footwear.

## 2023-02-03 NOTE — PROGRESS NOTES
Hospitalist Progress Note      PCP: LINA Yung - CNP    Date of Admission: 2/1/2023      Hospital Course:     Impression:     Estee Baltazar is a 40 y.o. female who presented with PMHx Hepatitis C, restless leg syndrome and recent surgery on Monday for removal of suburethral sling, vaginal exploration, abdominal exploration. Presented to the ED with c/o lower abdominal pain since surgery with bruising and swelling over suprapubic area, and then Tuesday developed right upper quadrant pain. Admitted for possible cholecystitis. Found to have chronic hepatitis as well as labial hematoma. A/P:    Abdominal pain: improved relative to admission. Patient complains of both surgical site discomfort as well as RUQ pain intermittently. 2/3 mostly related to hematoma at this time, possibly some due to chronic hepatitis. 2. Possible Gallbladder irritation related to chronic hepatitis: rocephin flagyl, gen surg consulted. 2/3 appreciate gen surg eval, no intervention at this time. 3. Labial hematoma S/p urethral sling removal: appreciate urology eval, will manage hematoma conservatively. 2/3 patient is understandably distressed about her swelling, we will monitor this and treat conservatively, appreciate urogyn eval.     4. Pruritis: possibly related to drug rxn, will change levaquin to rocephin. 2/3 improved today, Ok to continue prn benadryl for now. 5. Elevated LFT hx  hep c: GI consulted. Trend liver functions. Code status: full code    Dvt ppx: mechanical    Transition of care: home    ADOD: possibly 1-2 days. 24 hour care plan:    Cont abx, cont pain control and prn benadryl.  Conservative mgmt on pelvic site,       Medications:  Reviewed    Infusion Medications    sodium chloride       Scheduled Medications    lidocaine 1 % injection  5 mL IntraDERmal Once    pantoprazole  40 mg Oral QAM AC    cefTRIAXone (ROCEPHIN) IV  1,000 mg IntraVENous Q24H    sodium chloride flush  5-40 mL IntraVENous 2 times per day    metroNIDAZOLE  500 mg IntraVENous Q8H    rOPINIRole  0.5 mg Oral Nightly     PRN Meds: perflutren lipid microspheres, calcium carbonate, sodium chloride flush, sodium chloride, ondansetron **OR** ondansetron, polyethylene glycol, acetaminophen **OR** acetaminophen, HYDROmorphone, diphenhydrAMINE, magnesium sulfate      Intake/Output Summary (Last 24 hours) at 2/3/2023 1332  Last data filed at 2/3/2023 7218  Gross per 24 hour   Intake 360 ml   Output --   Net 360 ml         Exam:    /69   Pulse 73   Temp 98.6 °F (37 °C) (Oral)   Resp 16   Ht 5' 3\" (1.6 m)   Wt 193 lb 5.5 oz (87.7 kg)   SpO2 92%   BMI 34.25 kg/m²     General appearance: No apparent distress, appears stated age and cooperative. HEENT: Pupils equal, round, and reactive to light. Conjunctivae/corneas clear. Neck: Supple, with full range of motion. No jugular venous distention. Trachea midline. Respiratory:  Normal respiratory effort. Clear to auscultation, bilaterally without Rales/Wheezes/Rhonchi. Cardiovascular: Regular rate and rhythm with normal S1/S2 without murmurs, rubs or gallops. Abdomen: Soft, non-tender, non-distended with normal bowel sounds. Musculoskeletal: No clubbing, cyanosis or edema bilaterally. Full range of motion without deformity. Skin: Skin color, texture, turgor normal.  No rashes or lesions. Neurologic:  Neurovascularly intact without any focal sensory/motor deficits.  Cranial nerves: II-XII intact, grossly non-focal.  Psychiatric: Alert and oriented, thought content appropriate, normal insight  Capillary Refill: Brisk,< 3 seconds   Peripheral Pulses: +2 palpable, equal bilaterally       Labs:   Recent Labs     02/01/23 1811 02/02/23  0515   WBC 4.8 4.2   HGB 11.9* 10.1*   HCT 36.8 31.0*   * 97*       Recent Labs     02/01/23  1811 02/02/23  0515   * 141   K 3.4* 3.9    109   CO2 21 23   BUN 12 9   CREATININE 0.7 0.6   CALCIUM 8.9 8.2*       Recent Labs 02/01/23 1811 02/02/23  0515   * 108*   * 146*   BILITOT 0.3 0.4   ALKPHOS 116 89       No results for input(s): INR in the last 72 hours. Recent Labs     02/01/23 1811   TROPONINI <0.01         Urinalysis:      Lab Results   Component Value Date/Time    NITRU Negative 02/01/2023 06:11 PM    NITRU neg 09/07/2022 12:00 AM    WBCUA 42 02/01/2023 06:11 PM    BACTERIA 3+ 02/01/2023 06:11 PM    RBCUA 32 02/01/2023 06:11 PM    BLOODU LARGE 02/01/2023 06:11 PM    SPECGRAV 1.020 02/01/2023 06:11 PM    GLUCOSEU Negative 02/01/2023 06:11 PM    GLUCOSEU NEGATIVE 05/29/2012 11:35 PM       Radiology:  US ABDOMEN LIMITED   Final Result   1. Mild gallbladder wall thickening and trace pericholecystic fluid without   sonographic Schaffer's sign or stones favored to be related to underlying liver   dysfunction. Acute acalculous cholecystitis is in the differential but felt   to be less likely. If there is persistent clinical suspicion for acute   cholecystitis, recommend HIDA scan. 2. Changes of diffuse liver disease. Clinical correlation advised. CT ABDOMEN PELVIS W IV CONTRAST Additional Contrast? None   Final Result   1. Moderate gallbladder wall thickening with small pericholecystic fluid. This may reflect acute cholecystitis versus reactive change to underlying   diffuse liver disease. 2. Suspected diffuse liver disease with mild splenomegaly. Clinical   correlation advised. 3. Subcutaneous fat stranding of the low anterior abdominal wall with high   density ovoid lesion versus collection in the pubic region measuring 3.1 cm,   possible hematoma. This could be better evaluated with limited ultrasound. Recommend imaging follow-up to resolution. 4. Simple cystic lesion of the left adnexa measuring 7.4 cm. Recommendation   below. 5. Scattered subcentimeter and pericentimeter short axis retroperitoneal and   upper abdominal the lymph nodes, indeterminate.       RECOMMENDATIONS:   7.4 cm left adnexal simple-appearing cyst. Recommend follow-up pelvic US in   3-6 months. Reference: JACR 2020 Feb;17(2):248-254            CT CHEST PULMONARY EMBOLISM W CONTRAST   Final Result   No evidence of pulmonary embolism or aortic dissection. Small bilateral pleural effusions. Mild adjacent airspace disease is likely   passive atelectasis, but pneumonia and edema remain considerations. Cirrhotic morphology of the liver, with splenomegaly. Upper abdominal   lymphadenopathy, incompletely evaluated.                  Assessment/Plan:    Active Hospital Problems    Diagnosis Date Noted    Adnexal cyst [N94.9] 02/02/2023     Priority: Medium    Abdominal pain [R10.9] 02/01/2023     Priority: Medium    Hypomagnesemia [E83.42] 02/01/2023     Priority: Medium    Thrombocytopenia ([address] [D69.6] 02/01/2023     Priority: Medium    Cholecystitis [K81.9] 02/01/2023     Priority: [address], DO

## 2023-02-03 NOTE — PROGRESS NOTES
Arrived to place PICC line in patient with ambar CRABTREE at bedside, pre procedure and allergies reviewed, no issues accessing right basilic vein, pt tolerated well, blood return and flushed well, tip verified with 3cg technology with peaked p-waves, OK to use PICC. Pt left in stable condition and bed braked and in lowest position. Pt call light within reach.  Handoff to RN

## 2023-02-04 LAB
A/G RATIO: 1.2 (ref 1.1–2.2)
ALBUMIN SERPL-MCNC: 3 G/DL (ref 3.4–5)
ALP BLD-CCNC: 90 U/L (ref 40–129)
ALT SERPL-CCNC: 148 U/L (ref 10–40)
ANION GAP SERPL CALCULATED.3IONS-SCNC: 9 MMOL/L (ref 3–16)
AST SERPL-CCNC: 140 U/L (ref 15–37)
BILIRUB SERPL-MCNC: 0.6 MG/DL (ref 0–1)
BUN BLDV-MCNC: 8 MG/DL (ref 7–20)
CALCIUM SERPL-MCNC: 8.3 MG/DL (ref 8.3–10.6)
CHLORIDE BLD-SCNC: 103 MMOL/L (ref 99–110)
CO2: 25 MMOL/L (ref 21–32)
CREAT SERPL-MCNC: 0.5 MG/DL (ref 0.6–1.1)
F-ACTIN AB IGG: 7 UNITS (ref 0–19)
GFR SERPL CREATININE-BSD FRML MDRD: >60 ML/MIN/{1.73_M2}
GLUCOSE BLD-MCNC: 86 MG/DL (ref 70–99)
HAV IGM SER IA-ACNC: ABNORMAL
HCT VFR BLD CALC: 31.1 % (ref 36–48)
HEMOGLOBIN: 10.1 G/DL (ref 12–16)
HEPATITIS B CORE IGM ANTIBODY: REACTIVE
HEPATITIS B SURFACE ANTIGEN INTERPRETATION: ABNORMAL
HEPATITIS C ANTIBODY INTERPRETATION: REACTIVE
MCH RBC QN AUTO: 28 PG (ref 26–34)
MCHC RBC AUTO-ENTMCNC: 32.5 G/DL (ref 31–36)
MCV RBC AUTO: 86.2 FL (ref 80–100)
PDW BLD-RTO: 14.3 % (ref 12.4–15.4)
PLATELET # BLD: 104 K/UL (ref 135–450)
PMV BLD AUTO: 8 FL (ref 5–10.5)
POTASSIUM REFLEX MAGNESIUM: 3.9 MMOL/L (ref 3.5–5.1)
RBC # BLD: 3.6 M/UL (ref 4–5.2)
SODIUM BLD-SCNC: 137 MMOL/L (ref 136–145)
TOTAL PROTEIN: 5.6 G/DL (ref 6.4–8.2)
WBC # BLD: 3.8 K/UL (ref 4–11)

## 2023-02-04 PROCEDURE — 6370000000 HC RX 637 (ALT 250 FOR IP): Performed by: FAMILY MEDICINE

## 2023-02-04 PROCEDURE — 6360000002 HC RX W HCPCS: Performed by: FAMILY MEDICINE

## 2023-02-04 PROCEDURE — 2500000003 HC RX 250 WO HCPCS: Performed by: NURSE PRACTITIONER

## 2023-02-04 PROCEDURE — 1200000000 HC SEMI PRIVATE

## 2023-02-04 PROCEDURE — 6370000000 HC RX 637 (ALT 250 FOR IP): Performed by: NURSE PRACTITIONER

## 2023-02-04 PROCEDURE — 2580000003 HC RX 258: Performed by: FAMILY MEDICINE

## 2023-02-04 PROCEDURE — 80053 COMPREHEN METABOLIC PANEL: CPT

## 2023-02-04 PROCEDURE — 6360000002 HC RX W HCPCS: Performed by: NURSE PRACTITIONER

## 2023-02-04 PROCEDURE — 85027 COMPLETE CBC AUTOMATED: CPT

## 2023-02-04 RX ORDER — PANTOPRAZOLE SODIUM 40 MG/1
40 TABLET, DELAYED RELEASE ORAL
Status: DISCONTINUED | OUTPATIENT
Start: 2023-02-04 | End: 2023-02-07 | Stop reason: HOSPADM

## 2023-02-04 RX ADMIN — HYDROMORPHONE HYDROCHLORIDE 1 MG: 1 INJECTION, SOLUTION INTRAMUSCULAR; INTRAVENOUS; SUBCUTANEOUS at 01:01

## 2023-02-04 RX ADMIN — METRONIDAZOLE 500 MG: 500 INJECTION, SOLUTION INTRAVENOUS at 12:24

## 2023-02-04 RX ADMIN — PANTOPRAZOLE SODIUM 40 MG: 40 TABLET, DELAYED RELEASE ORAL at 16:17

## 2023-02-04 RX ADMIN — PANTOPRAZOLE SODIUM 40 MG: 40 TABLET, DELAYED RELEASE ORAL at 06:08

## 2023-02-04 RX ADMIN — ALUMINUM HYDROXIDE, MAGNESIUM HYDROXIDE, AND SIMETHICONE: 200; 200; 20 SUSPENSION ORAL at 12:21

## 2023-02-04 RX ADMIN — DIPHENHYDRAMINE HYDROCHLORIDE 12.5 MG: 50 INJECTION, SOLUTION INTRAMUSCULAR; INTRAVENOUS at 22:14

## 2023-02-04 RX ADMIN — CEFTRIAXONE 1000 MG: 1 INJECTION, POWDER, FOR SOLUTION INTRAMUSCULAR; INTRAVENOUS at 13:34

## 2023-02-04 RX ADMIN — ONDANSETRON 4 MG: 2 INJECTION INTRAMUSCULAR; INTRAVENOUS at 22:14

## 2023-02-04 RX ADMIN — DIPHENHYDRAMINE HYDROCHLORIDE 12.5 MG: 50 INJECTION, SOLUTION INTRAMUSCULAR; INTRAVENOUS at 11:31

## 2023-02-04 RX ADMIN — HYDROMORPHONE HYDROCHLORIDE 1 MG: 1 INJECTION, SOLUTION INTRAMUSCULAR; INTRAVENOUS; SUBCUTANEOUS at 04:57

## 2023-02-04 RX ADMIN — HYDROMORPHONE HYDROCHLORIDE 1 MG: 1 INJECTION, SOLUTION INTRAMUSCULAR; INTRAVENOUS; SUBCUTANEOUS at 09:30

## 2023-02-04 RX ADMIN — METRONIDAZOLE 500 MG: 500 INJECTION, SOLUTION INTRAVENOUS at 05:02

## 2023-02-04 RX ADMIN — METRONIDAZOLE 500 MG: 500 INJECTION, SOLUTION INTRAVENOUS at 22:13

## 2023-02-04 RX ADMIN — HYDROMORPHONE HYDROCHLORIDE 1 MG: 1 INJECTION, SOLUTION INTRAMUSCULAR; INTRAVENOUS; SUBCUTANEOUS at 22:15

## 2023-02-04 RX ADMIN — MAGNESIUM HYDROXIDE 30 ML: 400 SUSPENSION ORAL at 16:16

## 2023-02-04 RX ADMIN — HYDROMORPHONE HYDROCHLORIDE 1 MG: 1 INJECTION, SOLUTION INTRAMUSCULAR; INTRAVENOUS; SUBCUTANEOUS at 16:17

## 2023-02-04 RX ADMIN — DIPHENHYDRAMINE HYDROCHLORIDE 12.5 MG: 50 INJECTION, SOLUTION INTRAMUSCULAR; INTRAVENOUS at 04:57

## 2023-02-04 RX ADMIN — ROPINIROLE HYDROCHLORIDE 0.5 MG: 0.5 TABLET, FILM COATED ORAL at 22:15

## 2023-02-04 RX ADMIN — HYDROMORPHONE HYDROCHLORIDE 1 MG: 1 INJECTION, SOLUTION INTRAMUSCULAR; INTRAVENOUS; SUBCUTANEOUS at 19:19

## 2023-02-04 RX ADMIN — ONDANSETRON 4 MG: 2 INJECTION INTRAMUSCULAR; INTRAVENOUS at 09:34

## 2023-02-04 RX ADMIN — ONDANSETRON 4 MG: 2 INJECTION INTRAMUSCULAR; INTRAVENOUS at 16:16

## 2023-02-04 ASSESSMENT — PAIN - FUNCTIONAL ASSESSMENT
PAIN_FUNCTIONAL_ASSESSMENT: PREVENTS OR INTERFERES SOME ACTIVE ACTIVITIES AND ADLS
PAIN_FUNCTIONAL_ASSESSMENT: ACTIVITIES ARE NOT PREVENTED
PAIN_FUNCTIONAL_ASSESSMENT: ACTIVITIES ARE NOT PREVENTED

## 2023-02-04 ASSESSMENT — PAIN SCALES - GENERAL
PAINLEVEL_OUTOF10: 9
PAINLEVEL_OUTOF10: 4
PAINLEVEL_OUTOF10: 10
PAINLEVEL_OUTOF10: 7
PAINLEVEL_OUTOF10: 9
PAINLEVEL_OUTOF10: 4
PAINLEVEL_OUTOF10: 10
PAINLEVEL_OUTOF10: 10
PAINLEVEL_OUTOF10: 6
PAINLEVEL_OUTOF10: 8
PAINLEVEL_OUTOF10: 7
PAINLEVEL_OUTOF10: 10
PAINLEVEL_OUTOF10: 4

## 2023-02-04 ASSESSMENT — PAIN DESCRIPTION - LOCATION
LOCATION: VAGINA

## 2023-02-04 ASSESSMENT — PAIN DESCRIPTION - DESCRIPTORS
DESCRIPTORS: BURNING
DESCRIPTORS: ACHING
DESCRIPTORS: STABBING
DESCRIPTORS: ACHING

## 2023-02-04 ASSESSMENT — PAIN SCALES - WONG BAKER
WONGBAKER_NUMERICALRESPONSE: 0

## 2023-02-04 ASSESSMENT — PAIN DESCRIPTION - ONSET: ONSET: ON-GOING

## 2023-02-04 ASSESSMENT — PAIN DESCRIPTION - PAIN TYPE: TYPE: ACUTE PAIN

## 2023-02-04 ASSESSMENT — PAIN DESCRIPTION - ORIENTATION
ORIENTATION: MID
ORIENTATION: MID
ORIENTATION: RIGHT;LEFT
ORIENTATION: RIGHT;LEFT
ORIENTATION: MID
ORIENTATION: MID

## 2023-02-04 ASSESSMENT — PAIN DESCRIPTION - DIRECTION: RADIATING_TOWARDS: RIGHT

## 2023-02-04 ASSESSMENT — PAIN DESCRIPTION - FREQUENCY: FREQUENCY: CONTINUOUS

## 2023-02-04 NOTE — PROGRESS NOTES
Gastroenterology Progress Note    Rosalind Metzger is a 40 y.o. female patient. Hospitalization Day:3    SUBJECTIVE:  Patient still with low pelvic pain reported. No RUQ pain. ROS:  Gastrointestinal ROS: positive for - abdominal pain    Physical    VITALS:  /72   Pulse 68   Temp 98.4 °F (36.9 °C) (Oral)   Resp 14   Ht 5' 3\" (1.6 m)   Wt 182 lb 8.7 oz (82.8 kg)   SpO2 91%   BMI 32.34 kg/m²   TEMPERATURE:  Current - Temp: 98.4 °F (36.9 °C); Max - Temp  Av.3 °F (36.8 °C)  Min: 98.2 °F (36.8 °C)  Max: 98.4 °F (36.9 °C)    General:  Alert and oriented,  No apparent distress  Skin- without jaundice  Eyes: anicteric sclera  Cardiac: RRR, Nl s1s2, without murmurs  Lungs CTA Bilaterally, normal effort  Abdomen soft, ND, TTP in loer left and suprapubic region, no HSM, Bowel sounds decreased  Ext: without edema  Neuro: no asterixis     Data    Data Review:    Recent Labs     23  18123  0515   WBC 4.8 4.2   HGB 11.9* 10.1*   HCT 36.8 31.0*   MCV 86.4 87.3   * 97*     Recent Labs     23  18123  0515   * 141   K 3.4* 3.9    109   CO2 21 23   BUN 12 9   CREATININE 0.7 0.6     Recent Labs     23  18123  0515   * 108*   * 146*   BILITOT 0.3 0.4   ALKPHOS 116 89     No results for input(s): LIPASE, AMYLASE in the last 72 hours. No results for input(s): PROTIME, INR in the last 72 hours. Radiology Review:    US ABDOMEN LIMITED   Final Result   1. Mild gallbladder wall thickening and trace pericholecystic fluid without   sonographic Schaffer's sign or stones favored to be related to underlying liver   dysfunction. Acute acalculous cholecystitis is in the differential but felt   to be less likely. If there is persistent clinical suspicion for acute   cholecystitis, recommend HIDA scan. 2. Changes of diffuse liver disease. Clinical correlation advised.          CT ABDOMEN PELVIS W IV CONTRAST Additional Contrast? None   Final Result   1. Moderate gallbladder wall thickening with small pericholecystic fluid. This may reflect acute cholecystitis versus reactive change to underlying   diffuse liver disease. 2. Suspected diffuse liver disease with mild splenomegaly. Clinical   correlation advised. 3. Subcutaneous fat stranding of the low anterior abdominal wall with high   density ovoid lesion versus collection in the pubic region measuring 3.1 cm,   possible hematoma. This could be better evaluated with limited ultrasound. Recommend imaging follow-up to resolution. 4. Simple cystic lesion of the left adnexa measuring 7.4 cm. Recommendation   below. 5. Scattered subcentimeter and pericentimeter short axis retroperitoneal and   upper abdominal the lymph nodes, indeterminate. RECOMMENDATIONS:   7.4 cm left adnexal simple-appearing cyst. Recommend follow-up pelvic US in   3-6 months. Reference: JACR 2020 Feb;17(2):248-254            CT CHEST PULMONARY EMBOLISM W CONTRAST   Final Result   No evidence of pulmonary embolism or aortic dissection. Small bilateral pleural effusions. Mild adjacent airspace disease is likely   passive atelectasis, but pneumonia and edema remain considerations. Cirrhotic morphology of the liver, with splenomegaly. Upper abdominal   lymphadenopathy, incompletely evaluated. ASSESSMENT:  1) Lower abdominal pain- POD#5 s/p urethral sling removal with vaginal/abdominal exploration due to allergic reaction (1/30/23). Left sided adnexal cyst noted on CT. GYN managing and following her for this     2) Chronically elevated liver enzymes with ? Cirrhosis. - Cirrhotic liver morphology and splenomegaly noted on CT. Patient with chronic HCV reported and no prior treatment for this.    LFT's are chronically elevated since 2015.  ? Superimposed NIEVES    3) Nonspecific gallbladder wall thickening without symptoms of cholecystitis- Suspect that the gallbladder wall thickening is related to chronic hepatitis with ? cirrhosis. Low clinical suspicion for acute cholecystitis as a cause for increased LFT's     4) Right labial hematoma- followed by urogynecology. 5) Left sided adnexal cyst noted on CT     Plan:  Pain management should be dealt with by GYN team  Patient can follow up with me in several weeks to review the reversible liver disease workup sent off on the patient and to discuss treatment options for her Hep C and possible NIEVES  Patient may benefit from checking vaccination status for Hep A and B  Patient may benefit from a screening endoscopy as an outpatient. No other additional inpatient workup is planned. Will sign off. Please call with any liver related questions. Thank you for allowing me to participate in the care of your patient. Please feel free to contact me with any concerns.    Soren Burger, DO

## 2023-02-04 NOTE — PROGRESS NOTES
Tasneem area has small bleeding, it is burning on urination, patient if possible can benefit from lidocaine cream to apply on tasneem area to help ease up the pain when urinating. Patient might also benefit from adding oral pain medication  as dilaudid does not control the pain for 4 hours.

## 2023-02-04 NOTE — PROGRESS NOTES
Patient doing well today, has only received PRN for pain and nauseous twice this shift. Patient was able to ambulate and get into the shower, tolerated well. PICC dressing changed, new dressing clean,dry,intact. All needs meet.      Electronically signed by Tereso Granda RN on 2/4/2023 at 4:52 PM

## 2023-02-04 NOTE — PROGRESS NOTES
Call placed to [facility] about patient having bloody vaginal discharge, HIPPA compliant voicemail with callback number placed. Unsure if this is from cycle or from hematoma. Only small amount on blood and blood clots noted on vaginal pad. New pad placed. Electronically signed by Dorita Lombardo RN on 2/4/2023 at 9:51 AM    Vaginal bleeding has stopped at this time, patient stating pain is manageable. Will continue to monitor.      Electronically signed by Dorita Lombardo RN on 2/4/2023 at 2:09 PM

## 2023-02-04 NOTE — PROGRESS NOTES
Urogynecology Progress note    PCP: LINA Villareal CNP    Date of Admission: 2/1/2023    Date of Service: Pt seen/examined on 02/04/2023      History Of Present Illness:     Received request to evaluate patients vaginal bleeding and hematoma after removal of suburethral sing and vaginal exploration on 1/3/2023. Patient began with moderate vaginal bleeding over night. Tasneem pad with bright red blood. LMP:  Uncertain as they have not been regular. Reports menses is normally heavy. Allergies:  Cobalt, Nickel, Other, and Penicillins      PHYSICAL EXAM:    /72   Pulse 68   Temp 98.4 °F (36.9 °C) (Oral)   Resp 14   Ht 5' 3\" (1.6 m)   Wt 182 lb 8.7 oz (82.8 kg)   SpO2 91%   BMI 32.34 kg/m²   General Appearance: alert and oriented to person, place and time    exam:  continued bruising and swelling of mons and labia. Vaginal incision palpated and visualized using speculum. Sutures are intact with no visible discharge, bleeding or clots. Vaginal bleeding appears to be menstrual  but patient would not tolerated full speculum exam.   Ice pack replaced for patient. CBC   Recent Labs     02/01/23  1811 02/02/23  0515 02/04/23  1140   WBC 4.8 4.2 3.8*   HGB 11.9* 10.1* 10.1*   HCT 36.8 31.0* 31.1*   * 97* 104*      Stable and unchanged with this new bleeding      ASSESSMENT/PLAN:  Abdominal incision is intact. Vaginal incision is intact with no signs of infection or bleeding  Patient likely having menses which she reports is usually heavy. CBC remains stable   Continue to use tasneem pads. LINA Batres CNP  The note was completed using EMR. Every effort was made to ensure accuracy; however, inadvertent computerized transcription errors may be present.

## 2023-02-04 NOTE — PROGRESS NOTES
Hospitalist Progress Note      PCP: Roxanne Vasquez, APRN - CNP    Date of Admission: 2/1/2023      Hospital Course:     Impression:     Claribel Myesr is a 37 y.o. female who presented with PMHx Hepatitis C, restless leg syndrome and recent surgery on Monday for removal of suburethral sling, vaginal exploration, abdominal exploration. Presented to the ED with c/o lower abdominal pain since surgery with bruising and swelling over suprapubic area, and then Tuesday developed right upper quadrant pain. Admitted for possible cholecystitis. Found to have chronic hepatitis as well as labial hematoma. 2/4 patient reports bleeding, may be vaginal as the labial hematoma seems intact     A/P:    Abdominal pain: improved relative to admission. Patient complains of both surgical site discomfort as well as RUQ pain intermittently. 2/4 still having abdominal pain, I have adjusted her PPI provided GI cocktail.     2. Possible Gallbladder irritation related to chronic hepatitis: rocephin flagyl, gen surg consulted. 2/3 appreciate gen surg eval, no intervention at this time.     3. Labial hematoma S/p urethral sling removal: appreciate urology eval, will manage hematoma conservatively. 2/3 patient is understandably distressed about her swelling, we will monitor this and treat conservatively, appreciate urogyn eval. 2/4 will ask urogyn to eval again due to complaint of bleed, hematoma does not appear ruptured at the time of my evaluation.     4. Pruritis: possibly related to drug rxn, will change levaquin to rocephin. 2/4 resolved. Prn benadryl may remain.     5. Elevated LFT hx  hep c: GI consulted. Trend liver functions.     Code status: full code    Dvt ppx: mechanical    Transition of care: home    ADOD: possibly 2 days.     24 hour care plan:    Cont abx, cont pain control and prn benadryl. Conservative mgmt on pelvic site. Adjusting antacid regimen.       Medications:  Reviewed    Infusion Medications    sodium  chloride       Scheduled Medications    lidocaine 1 % injection  5 mL IntraDERmal Once    pantoprazole  40 mg Oral QAM AC    cefTRIAXone (ROCEPHIN) IV  1,000 mg IntraVENous Q24H    sodium chloride flush  5-40 mL IntraVENous 2 times per day    metroNIDAZOLE  500 mg IntraVENous Q8H    rOPINIRole  0.5 mg Oral Nightly     PRN Meds: perflutren lipid microspheres, calcium carbonate, sodium chloride flush, sodium chloride, ondansetron **OR** ondansetron, polyethylene glycol, acetaminophen **OR** acetaminophen, HYDROmorphone, diphenhydrAMINE, magnesium sulfate      Intake/Output Summary (Last 24 hours) at 2/4/2023 1155  Last data filed at 2/4/2023 0654  Gross per 24 hour   Intake 560 ml   Output --   Net 560 ml         Exam:    /72   Pulse 68   Temp 98.4 °F (36.9 °C) (Oral)   Resp 14   Ht 5' 3\" (1.6 m)   Wt 182 lb 8.7 oz (82.8 kg)   SpO2 91%   BMI 32.34 kg/m²     General appearance: No apparent distress, appears stated age and cooperative. HEENT: Pupils equal, round, and reactive to light. Conjunctivae/corneas clear. Neck: Supple, with full range of motion. No jugular venous distention. Trachea midline. Respiratory:  Normal respiratory effort. Clear to auscultation, bilaterally without Rales/Wheezes/Rhonchi. Cardiovascular: Regular rate and rhythm with normal S1/S2 without murmurs, rubs or gallops. Abdomen: Soft, non-tender, non-distended with normal bowel sounds. Musculoskeletal: No clubbing, cyanosis or edema bilaterally. Full range of motion without deformity. Skin: Skin color, texture, turgor normal.  No rashes or lesions. Neurologic:  Neurovascularly intact without any focal sensory/motor deficits.  Cranial nerves: II-XII intact, grossly non-focal.  Psychiatric: Alert and oriented, thought content appropriate, normal insight  Capillary Refill: Brisk,< 3 seconds   Peripheral Pulses: +2 palpable, equal bilaterally       Labs:   Recent Labs     02/01/23  1811 02/02/23  0515   WBC 4.8 4.2   HGB 11.9* 10.1*   HCT 36.8 31.0*   * 97*       Recent Labs     02/01/23  1811 02/02/23  0515   * 141   K 3.4* 3.9    109   CO2 21 23   BUN 12 9   CREATININE 0.7 0.6   CALCIUM 8.9 8.2*       Recent Labs     02/01/23  1811 02/02/23  0515   * 108*   * 146*   BILITOT 0.3 0.4   ALKPHOS 116 89       No results for input(s): INR in the last 72 hours. Recent Labs     02/01/23 1811   TROPONINI <0.01         Urinalysis:      Lab Results   Component Value Date/Time    NITRU Negative 02/01/2023 06:11 PM    NITRU neg 09/07/2022 12:00 AM    WBCUA 42 02/01/2023 06:11 PM    BACTERIA 3+ 02/01/2023 06:11 PM    RBCUA 32 02/01/2023 06:11 PM    BLOODU LARGE 02/01/2023 06:11 PM    SPECGRAV 1.020 02/01/2023 06:11 PM    GLUCOSEU Negative 02/01/2023 06:11 PM    GLUCOSEU NEGATIVE 05/29/2012 11:35 PM       Radiology:  US ABDOMEN LIMITED   Final Result   1. Mild gallbladder wall thickening and trace pericholecystic fluid without   sonographic Schaffer's sign or stones favored to be related to underlying liver   dysfunction. Acute acalculous cholecystitis is in the differential but felt   to be less likely. If there is persistent clinical suspicion for acute   cholecystitis, recommend HIDA scan. 2. Changes of diffuse liver disease. Clinical correlation advised. CT ABDOMEN PELVIS W IV CONTRAST Additional Contrast? None   Final Result   1. Moderate gallbladder wall thickening with small pericholecystic fluid. This may reflect acute cholecystitis versus reactive change to underlying   diffuse liver disease. 2. Suspected diffuse liver disease with mild splenomegaly. Clinical   correlation advised. 3. Subcutaneous fat stranding of the low anterior abdominal wall with high   density ovoid lesion versus collection in the pubic region measuring 3.1 cm,   possible hematoma. This could be better evaluated with limited ultrasound. Recommend imaging follow-up to resolution.    4. Simple cystic lesion of the left adnexa measuring 7.4 cm. Recommendation   below. 5. Scattered subcentimeter and pericentimeter short axis retroperitoneal and   upper abdominal the lymph nodes, indeterminate. RECOMMENDATIONS:   7.4 cm left adnexal simple-appearing cyst. Recommend follow-up pelvic US in   3-6 months. Reference: JACR 2020 Feb;17(2):248-254            CT CHEST PULMONARY EMBOLISM W CONTRAST   Final Result   No evidence of pulmonary embolism or aortic dissection. Small bilateral pleural effusions. Mild adjacent airspace disease is likely   passive atelectasis, but pneumonia and edema remain considerations. Cirrhotic morphology of the liver, with splenomegaly. Upper abdominal   lymphadenopathy, incompletely evaluated.                  Assessment/Plan:    Active Hospital Problems    Diagnosis Date Noted    Adnexal cyst [N94.9] 02/02/2023     Priority: Medium    Abdominal pain [R10.9] 02/01/2023     Priority: Medium    Hypomagnesemia [E83.42] 02/01/2023     Priority: Medium    Thrombocytopenia ([address] [D69.6] 02/01/2023     Priority: Medium    Cholecystitis [K81.9] 02/01/2023     Priority: [address], DO

## 2023-02-05 LAB
A/G RATIO: 1 (ref 1.1–2.2)
ALBUMIN SERPL-MCNC: 2.9 G/DL (ref 3.4–5)
ALP BLD-CCNC: 83 U/L (ref 40–129)
ALT SERPL-CCNC: 132 U/L (ref 10–40)
ANION GAP SERPL CALCULATED.3IONS-SCNC: 8 MMOL/L (ref 3–16)
AST SERPL-CCNC: 115 U/L (ref 15–37)
BILIRUB SERPL-MCNC: 0.4 MG/DL (ref 0–1)
BUN BLDV-MCNC: 7 MG/DL (ref 7–20)
C-REACTIVE PROTEIN: 8.2 MG/L (ref 0–5.1)
CALCIUM SERPL-MCNC: 8.4 MG/DL (ref 8.3–10.6)
CHLORIDE BLD-SCNC: 104 MMOL/L (ref 99–110)
CO2: 25 MMOL/L (ref 21–32)
CREAT SERPL-MCNC: 0.6 MG/DL (ref 0.6–1.1)
GFR SERPL CREATININE-BSD FRML MDRD: >60 ML/MIN/{1.73_M2}
GLUCOSE BLD-MCNC: 92 MG/DL (ref 70–99)
HCT VFR BLD CALC: 29.3 % (ref 36–48)
HCV QNT BY NAAT IU/ML: ABNORMAL IU/ML
HCV QNT BY NAAT LOG IU/ML: 5.11 LOG IU/ML
HEMOGLOBIN: 10 G/DL (ref 12–16)
INTERPRETATION: DETECTED
LACTIC ACID: 1 MMOL/L (ref 0.4–2)
MCH RBC QN AUTO: 28.6 PG (ref 26–34)
MCHC RBC AUTO-ENTMCNC: 34.1 G/DL (ref 31–36)
MCV RBC AUTO: 83.9 FL (ref 80–100)
PDW BLD-RTO: 14.4 % (ref 12.4–15.4)
PLATELET # BLD: 104 K/UL (ref 135–450)
PMV BLD AUTO: 7.7 FL (ref 5–10.5)
POTASSIUM REFLEX MAGNESIUM: 3.7 MMOL/L (ref 3.5–5.1)
RBC # BLD: 3.49 M/UL (ref 4–5.2)
SODIUM BLD-SCNC: 137 MMOL/L (ref 136–145)
TOTAL CK: 43 U/L (ref 26–192)
TOTAL PROTEIN: 5.7 G/DL (ref 6.4–8.2)
WBC # BLD: 3.9 K/UL (ref 4–11)

## 2023-02-05 PROCEDURE — 2500000003 HC RX 250 WO HCPCS: Performed by: NURSE PRACTITIONER

## 2023-02-05 PROCEDURE — 80053 COMPREHEN METABOLIC PANEL: CPT

## 2023-02-05 PROCEDURE — 1200000000 HC SEMI PRIVATE

## 2023-02-05 PROCEDURE — 83605 ASSAY OF LACTIC ACID: CPT

## 2023-02-05 PROCEDURE — 6370000000 HC RX 637 (ALT 250 FOR IP): Performed by: FAMILY MEDICINE

## 2023-02-05 PROCEDURE — 82550 ASSAY OF CK (CPK): CPT

## 2023-02-05 PROCEDURE — 6360000002 HC RX W HCPCS: Performed by: FAMILY MEDICINE

## 2023-02-05 PROCEDURE — 6370000000 HC RX 637 (ALT 250 FOR IP): Performed by: NURSE PRACTITIONER

## 2023-02-05 PROCEDURE — 85027 COMPLETE CBC AUTOMATED: CPT

## 2023-02-05 PROCEDURE — 6360000002 HC RX W HCPCS: Performed by: NURSE PRACTITIONER

## 2023-02-05 PROCEDURE — 2580000003 HC RX 258: Performed by: FAMILY MEDICINE

## 2023-02-05 PROCEDURE — 86140 C-REACTIVE PROTEIN: CPT

## 2023-02-05 RX ORDER — DICYCLOMINE HYDROCHLORIDE 10 MG/1
10 CAPSULE ORAL
Status: DISCONTINUED | OUTPATIENT
Start: 2023-02-05 | End: 2023-02-06

## 2023-02-05 RX ADMIN — PANTOPRAZOLE SODIUM 40 MG: 40 TABLET, DELAYED RELEASE ORAL at 16:24

## 2023-02-05 RX ADMIN — DIPHENHYDRAMINE HYDROCHLORIDE 12.5 MG: 50 INJECTION, SOLUTION INTRAMUSCULAR; INTRAVENOUS at 21:32

## 2023-02-05 RX ADMIN — METRONIDAZOLE 500 MG: 500 INJECTION, SOLUTION INTRAVENOUS at 11:45

## 2023-02-05 RX ADMIN — DIPHENHYDRAMINE HYDROCHLORIDE 12.5 MG: 50 INJECTION, SOLUTION INTRAMUSCULAR; INTRAVENOUS at 05:35

## 2023-02-05 RX ADMIN — METRONIDAZOLE 500 MG: 500 INJECTION, SOLUTION INTRAVENOUS at 21:43

## 2023-02-05 RX ADMIN — METRONIDAZOLE 500 MG: 500 INJECTION, SOLUTION INTRAVENOUS at 05:39

## 2023-02-05 RX ADMIN — HYDROMORPHONE HYDROCHLORIDE 1 MG: 1 INJECTION, SOLUTION INTRAMUSCULAR; INTRAVENOUS; SUBCUTANEOUS at 01:54

## 2023-02-05 RX ADMIN — HYDROMORPHONE HYDROCHLORIDE 1 MG: 1 INJECTION, SOLUTION INTRAMUSCULAR; INTRAVENOUS; SUBCUTANEOUS at 07:01

## 2023-02-05 RX ADMIN — HYDROMORPHONE HYDROCHLORIDE 1 MG: 1 INJECTION, SOLUTION INTRAMUSCULAR; INTRAVENOUS; SUBCUTANEOUS at 13:25

## 2023-02-05 RX ADMIN — ONDANSETRON 4 MG: 2 INJECTION INTRAMUSCULAR; INTRAVENOUS at 20:18

## 2023-02-05 RX ADMIN — ONDANSETRON 4 MG: 2 INJECTION INTRAMUSCULAR; INTRAVENOUS at 07:01

## 2023-02-05 RX ADMIN — CEFTRIAXONE 1000 MG: 1 INJECTION, POWDER, FOR SOLUTION INTRAMUSCULAR; INTRAVENOUS at 13:33

## 2023-02-05 RX ADMIN — HYDROMORPHONE HYDROCHLORIDE 1 MG: 1 INJECTION, SOLUTION INTRAMUSCULAR; INTRAVENOUS; SUBCUTANEOUS at 21:30

## 2023-02-05 RX ADMIN — DIPHENHYDRAMINE HYDROCHLORIDE 12.5 MG: 50 INJECTION, SOLUTION INTRAMUSCULAR; INTRAVENOUS at 11:41

## 2023-02-05 RX ADMIN — PANTOPRAZOLE SODIUM 40 MG: 40 TABLET, DELAYED RELEASE ORAL at 05:41

## 2023-02-05 RX ADMIN — HYDROMORPHONE HYDROCHLORIDE 1 MG: 1 INJECTION, SOLUTION INTRAMUSCULAR; INTRAVENOUS; SUBCUTANEOUS at 16:25

## 2023-02-05 RX ADMIN — DICYCLOMINE HYDROCHLORIDE 10 MG: 10 CAPSULE ORAL at 16:24

## 2023-02-05 RX ADMIN — HYDROMORPHONE HYDROCHLORIDE 1 MG: 1 INJECTION, SOLUTION INTRAMUSCULAR; INTRAVENOUS; SUBCUTANEOUS at 10:11

## 2023-02-05 RX ADMIN — ROPINIROLE HYDROCHLORIDE 0.5 MG: 0.5 TABLET, FILM COATED ORAL at 21:32

## 2023-02-05 RX ADMIN — ONDANSETRON 4 MG: 2 INJECTION INTRAMUSCULAR; INTRAVENOUS at 13:25

## 2023-02-05 ASSESSMENT — PAIN DESCRIPTION - LOCATION
LOCATION: VAGINA
LOCATION: GROIN;ABDOMEN

## 2023-02-05 ASSESSMENT — PAIN SCALES - GENERAL
PAINLEVEL_OUTOF10: 10
PAINLEVEL_OUTOF10: 0
PAINLEVEL_OUTOF10: 10
PAINLEVEL_OUTOF10: 10

## 2023-02-05 ASSESSMENT — PAIN DESCRIPTION - PAIN TYPE: TYPE: ACUTE PAIN

## 2023-02-05 ASSESSMENT — PAIN SCALES - WONG BAKER: WONGBAKER_NUMERICALRESPONSE: 0

## 2023-02-05 ASSESSMENT — PAIN DESCRIPTION - ONSET: ONSET: ON-GOING

## 2023-02-05 ASSESSMENT — PAIN DESCRIPTION - ORIENTATION
ORIENTATION: LEFT;RIGHT
ORIENTATION: LEFT;RIGHT
ORIENTATION: RIGHT;LEFT;MID

## 2023-02-05 ASSESSMENT — PAIN DESCRIPTION - DESCRIPTORS
DESCRIPTORS: STABBING
DESCRIPTORS: STABBING
DESCRIPTORS: STABBING;DISCOMFORT

## 2023-02-05 ASSESSMENT — PAIN DESCRIPTION - FREQUENCY: FREQUENCY: CONTINUOUS

## 2023-02-05 NOTE — PROGRESS NOTES
Hospitalist Progress Note      PCP: Tony Meléndez, APRN - CNP    Date of Admission: 2/1/2023      Hospital Course:     Impression:     Lovely Walker is a 40 y.o. female who presented with PMHx Hepatitis C, restless leg syndrome and recent surgery on Monday for removal of suburethral sling, vaginal exploration, abdominal exploration. Presented to the ED with c/o lower abdominal pain since surgery with bruising and swelling over suprapubic area, and then Tuesday developed right upper quadrant pain. Admitted for possible cholecystitis. Found to have chronic hepatitis as well as labial hematoma. 2/5 having her menstrual cycle most likely, still c/o significant pain, vital signs stable, will check inflammatory markers. A/P:    Abdominal pain: improved relative to admission. Patient complains of both surgical site discomfort as well as RUQ pain intermittently. 2/4 still having abdominal pain, I have adjusted her PPI provided GI cocktail. 2/5 will add bentyl. 2. Possible Gallbladder irritation related to chronic hepatitis: rocephin flagyl, gen surg consulted. 2/3 appreciate gen surg eval, no intervention at this time. 3. Labial hematoma S/p urethral sling removal: appreciate urology eval, will manage hematoma conservatively. 2/3 patient is understandably distressed about her swelling, we will monitor this and treat conservatively, appreciate urogyn eval. 2/4 will ask urogyn to eval again due to complaint of bleed, hematoma does not appear ruptured at the time of my evaluation. 2/5 oupt follow up with uro gyn. 4. Pruritis: possibly related to drug rxn, will change levaquin to rocephin. 2/4 resolved. Prn benadryl may remain. 5. Elevated LFT hx  hep c: GI consulted. Trend liver functions. 2/5 outpt GI f/u    Code status: full code    Dvt ppx: mechanical    Transition of care: home    ADOD: 2/6     24 hour care plan:    Cont abx, cont pain control and prn benadryl.  Conservative mgmt on pelvic site. Discharge home in AM.       Medications:  Reviewed    Infusion Medications    sodium chloride       Scheduled Medications    pantoprazole  40 mg Oral BID AC    lidocaine 1 % injection  5 mL IntraDERmal Once    cefTRIAXone (ROCEPHIN) IV  1,000 mg IntraVENous Q24H    sodium chloride flush  5-40 mL IntraVENous 2 times per day    metroNIDAZOLE  500 mg IntraVENous Q8H    rOPINIRole  0.5 mg Oral Nightly     PRN Meds: HYDROmorphone, perflutren lipid microspheres, sodium chloride flush, sodium chloride, ondansetron **OR** ondansetron, polyethylene glycol, acetaminophen **OR** acetaminophen, diphenhydrAMINE, magnesium sulfate      Intake/Output Summary (Last 24 hours) at 2/5/2023 1403  Last data filed at 2/5/2023 0932  Gross per 24 hour   Intake 240 ml   Output --   Net 240 ml         Exam:    /74   Pulse 77   Temp 98.6 °F (37 °C) (Oral)   Resp 17   Ht 5' 3\" (1.6 m)   Wt 187 lb 6.3 oz (85 kg)   SpO2 95%   BMI 33.19 kg/m²     General appearance: No apparent distress, appears stated age and cooperative. HEENT: Pupils equal, round, and reactive to light. Conjunctivae/corneas clear. Neck: Supple, with full range of motion. No jugular venous distention. Trachea midline. Respiratory:  Normal respiratory effort. Clear to auscultation, bilaterally without Rales/Wheezes/Rhonchi. Cardiovascular: Regular rate and rhythm with normal S1/S2 without murmurs, rubs or gallops. Abdomen: Soft, non-tender, non-distended with normal bowel sounds. Musculoskeletal: No clubbing, cyanosis or edema bilaterally. Full range of motion without deformity. Skin: Skin color, texture, turgor normal.  No rashes or lesions. Neurologic:  Neurovascularly intact without any focal sensory/motor deficits.  Cranial nerves: II-XII intact, grossly non-focal.  Psychiatric: Alert and oriented, thought content appropriate, normal insight  Capillary Refill: Brisk,< 3 seconds   Peripheral Pulses: +2 palpable, equal bilaterally       Labs: Recent Labs     02/04/23  1140 02/05/23  0530   WBC 3.8* 3.9*   HGB 10.1* 10.0*   HCT 31.1* 29.3*   * 104*       Recent Labs     02/04/23  1140 02/05/23  0530    137   K 3.9 3.7    104   CO2 25 25   BUN 8 7   CREATININE 0.5* 0.6   CALCIUM 8.3 8.4       Recent Labs     02/04/23  1140 02/05/23  0530   * 115*   * 132*   BILITOT 0.6 0.4   ALKPHOS 90 83       No results for input(s): INR in the last 72 hours. No results for input(s): [place] in the last 72 hours. Urinalysis:      Lab Results   Component Value Date/Time    NITRU Negative 02/01/2023 06:11 PM    NITRU neg 09/07/2022 12:00 AM    WBCUA 42 02/01/2023 06:11 PM    BACTERIA 3+ 02/01/2023 06:11 PM    RBCUA 32 02/01/2023 06:11 PM    BLOODU LARGE 02/01/2023 06:11 PM    SPECGRAV 1.020 02/01/2023 06:11 PM    GLUCOSEU Negative 02/01/2023 06:11 PM    GLUCOSEU NEGATIVE 05/29/2012 11:35 PM       Radiology:  US ABDOMEN LIMITED   Final Result   1. Mild gallbladder wall thickening and trace pericholecystic fluid without   sonographic Schaffer's sign or stones favored to be related to underlying liver   dysfunction. Acute acalculous cholecystitis is in the differential but felt   to be less likely. If there is persistent clinical suspicion for acute   cholecystitis, recommend HIDA scan. 2. Changes of diffuse liver disease. Clinical correlation advised. CT ABDOMEN PELVIS W IV CONTRAST Additional Contrast? None   Final Result   1. Moderate gallbladder wall thickening with small pericholecystic fluid. This may reflect acute cholecystitis versus reactive change to underlying   diffuse liver disease. 2. Suspected diffuse liver disease with mild splenomegaly. Clinical   correlation advised. 3. Subcutaneous fat stranding of the low anterior abdominal wall with high   density ovoid lesion versus collection in the pubic region measuring 3.1 cm,   possible hematoma.   This could be better evaluated with limited ultrasound. Recommend imaging follow-up to resolution. 4. Simple cystic lesion of the left adnexa measuring 7.4 cm. Recommendation   below. 5. Scattered subcentimeter and pericentimeter short axis retroperitoneal and   upper abdominal the lymph nodes, indeterminate. RECOMMENDATIONS:   7.4 cm left adnexal simple-appearing cyst. Recommend follow-up pelvic US in   3-6 months. Reference: JACR 2020 Feb;17(2):248-254            CT CHEST PULMONARY EMBOLISM W CONTRAST   Final Result   No evidence of pulmonary embolism or aortic dissection. Small bilateral pleural effusions. Mild adjacent airspace disease is likely   passive atelectasis, but pneumonia and edema remain considerations. Cirrhotic morphology of the liver, with splenomegaly. Upper abdominal   lymphadenopathy, incompletely evaluated.                  Assessment/Plan:    Active Hospital Problems    Diagnosis Date Noted    Adnexal cyst [N94.9] 02/02/2023     Priority: Medium    Abdominal pain [R10.9] 02/01/2023     Priority: Medium    Hypomagnesemia [E83.42] 02/01/2023     Priority: Medium    Thrombocytopenia ([address] [D69.6] 02/01/2023     Priority: Medium    Cholecystitis [K81.9] 02/01/2023     Priority: [address], DO

## 2023-02-05 NOTE — PROGRESS NOTES
Patient seems to be more comfortable today compared to yesterday, she was medicated with pain medication at request, Ice on tasneem area, she was also given Zofran and Benadryl as request. VSS.

## 2023-02-05 NOTE — PLAN OF CARE
Problem: Pain  Goal: Verbalizes/displays adequate comfort level or baseline comfort level  2/5/2023 0346 by Julia Kumar RN  Outcome: Progressing     Problem: ABCDS Injury Assessment  Goal: Absence of physical injury  Outcome: Progressing        Problem: Discharge Planning  Goal: Discharge to home or other facility with appropriate resources  Outcome: Progressing

## 2023-02-05 NOTE — PLAN OF CARE
Problem: Discharge Planning  Goal: Discharge to home or other facility with appropriate resources  2/5/2023 0942 by Shanti Perrin RN  Outcome: Progressing  2/5/2023 0346 by Rl Neff RN  Outcome: Progressing  Flowsheets (Taken 2/4/2023 2055 by Colette Salazar RN)  Discharge to home or other facility with appropriate resources:   Identify barriers to discharge with patient and caregiver   Arrange for needed discharge resources and transportation as appropriate   Identify discharge learning needs (meds, wound care, etc)   Arrange for interpreters to assist at discharge as needed   Refer to discharge planning if patient needs post-hospital services based on physician order or complex needs related to functional status, cognitive ability or social support system     Problem: Pain  Goal: Verbalizes/displays adequate comfort level or baseline comfort level  2/5/2023 0942 by Shanti Perrin RN  Outcome: Progressing  2/5/2023 0346 by Rl Neff RN  Outcome: Progressing  2/4/2023 2057 by Colette Salazar RN  Outcome: Progressing     Problem: ABCDS Injury Assessment  Goal: Absence of physical injury  2/5/2023 0942 by Shanti Perrin RN  Outcome: Progressing  2/5/2023 0346 by Rl Neff RN  Outcome: Progressing  Flowsheets (Taken 2/4/2023 2051 by Colette Salazar RN)  Absence of Physical Injury: Implement safety measures based on patient assessment     Problem: Safety - Adult  Goal: Free from fall injury  2/5/2023 0942 by Shanti Perrin RN  Outcome: Progressing  2/5/2023 0346 by Rl Neff RN  Outcome: Progressing

## 2023-02-05 NOTE — PLAN OF CARE
Problem: Pain  Goal: Verbalizes/displays adequate comfort level or baseline comfort level  2/4/2023 2057 by Clayton Barrera RN  Outcome: Progressing  2/4/2023 1030 by Lakisha Wakefield RN  Outcome: Progressing

## 2023-02-06 ENCOUNTER — TELEPHONE (OUTPATIENT)
Dept: UROGYNECOLOGY | Age: 38
End: 2023-02-06

## 2023-02-06 LAB
A/G RATIO: 1.2 (ref 1.1–2.2)
AFP: 6.5 UG/L
ALBUMIN SERPL-MCNC: 3 G/DL (ref 3.4–5)
ALP BLD-CCNC: 83 U/L (ref 40–129)
ALPHA-1 ANTITRYPSIN: 199 MG/DL (ref 90–200)
ALT SERPL-CCNC: 122 U/L (ref 10–40)
ANION GAP SERPL CALCULATED.3IONS-SCNC: 8 MMOL/L (ref 3–16)
AST SERPL-CCNC: 105 U/L (ref 15–37)
BILIRUB SERPL-MCNC: 0.4 MG/DL (ref 0–1)
BUN BLDV-MCNC: 7 MG/DL (ref 7–20)
CALCIUM SERPL-MCNC: 8.3 MG/DL (ref 8.3–10.6)
CERULOPLASMIN: 23 MG/DL (ref 16–45)
CHLORIDE BLD-SCNC: 105 MMOL/L (ref 99–110)
CO2: 25 MMOL/L (ref 21–32)
CREAT SERPL-MCNC: 0.6 MG/DL (ref 0.6–1.1)
GFR SERPL CREATININE-BSD FRML MDRD: >60 ML/MIN/{1.73_M2}
GLUCOSE BLD-MCNC: 113 MG/DL (ref 70–99)
HCT VFR BLD CALC: 30.2 % (ref 36–48)
HEMOGLOBIN: 9.9 G/DL (ref 12–16)
MCH RBC QN AUTO: 28 PG (ref 26–34)
MCHC RBC AUTO-ENTMCNC: 32.8 G/DL (ref 31–36)
MCV RBC AUTO: 85.2 FL (ref 80–100)
PDW BLD-RTO: 14.9 % (ref 12.4–15.4)
PLATELET # BLD: 113 K/UL (ref 135–450)
PMV BLD AUTO: 7.9 FL (ref 5–10.5)
POTASSIUM REFLEX MAGNESIUM: 3.8 MMOL/L (ref 3.5–5.1)
RBC # BLD: 3.54 M/UL (ref 4–5.2)
SODIUM BLD-SCNC: 138 MMOL/L (ref 136–145)
TOTAL PROTEIN: 5.5 G/DL (ref 6.4–8.2)
WBC # BLD: 3.6 K/UL (ref 4–11)

## 2023-02-06 PROCEDURE — 2580000003 HC RX 258: Performed by: NURSE PRACTITIONER

## 2023-02-06 PROCEDURE — 2500000003 HC RX 250 WO HCPCS: Performed by: NURSE PRACTITIONER

## 2023-02-06 PROCEDURE — 9990000010 HC NO CHARGE VISIT

## 2023-02-06 PROCEDURE — 85027 COMPLETE CBC AUTOMATED: CPT

## 2023-02-06 PROCEDURE — 6370000000 HC RX 637 (ALT 250 FOR IP): Performed by: FAMILY MEDICINE

## 2023-02-06 PROCEDURE — 6370000000 HC RX 637 (ALT 250 FOR IP): Performed by: NURSE PRACTITIONER

## 2023-02-06 PROCEDURE — 6360000002 HC RX W HCPCS: Performed by: NURSE PRACTITIONER

## 2023-02-06 PROCEDURE — 6360000002 HC RX W HCPCS: Performed by: FAMILY MEDICINE

## 2023-02-06 PROCEDURE — 94760 N-INVAS EAR/PLS OXIMETRY 1: CPT

## 2023-02-06 PROCEDURE — 1200000000 HC SEMI PRIVATE

## 2023-02-06 PROCEDURE — 80053 COMPREHEN METABOLIC PANEL: CPT

## 2023-02-06 RX ORDER — DICYCLOMINE HYDROCHLORIDE 10 MG/1
20 CAPSULE ORAL
Status: DISCONTINUED | OUTPATIENT
Start: 2023-02-06 | End: 2023-02-07 | Stop reason: HOSPADM

## 2023-02-06 RX ORDER — OXYCODONE HYDROCHLORIDE AND ACETAMINOPHEN 5; 325 MG/1; MG/1
1 TABLET ORAL EVERY 4 HOURS PRN
Status: DISCONTINUED | OUTPATIENT
Start: 2023-02-06 | End: 2023-02-07 | Stop reason: HOSPADM

## 2023-02-06 RX ADMIN — HYDROMORPHONE HYDROCHLORIDE 0.5 MG: 1 INJECTION, SOLUTION INTRAMUSCULAR; INTRAVENOUS; SUBCUTANEOUS at 15:40

## 2023-02-06 RX ADMIN — PANTOPRAZOLE SODIUM 40 MG: 40 TABLET, DELAYED RELEASE ORAL at 15:41

## 2023-02-06 RX ADMIN — DIPHENHYDRAMINE HYDROCHLORIDE 12.5 MG: 50 INJECTION, SOLUTION INTRAMUSCULAR; INTRAVENOUS at 19:55

## 2023-02-06 RX ADMIN — DIPHENHYDRAMINE HYDROCHLORIDE 12.5 MG: 50 INJECTION, SOLUTION INTRAMUSCULAR; INTRAVENOUS at 03:39

## 2023-02-06 RX ADMIN — ROPINIROLE HYDROCHLORIDE 0.5 MG: 0.5 TABLET, FILM COATED ORAL at 19:55

## 2023-02-06 RX ADMIN — PANTOPRAZOLE SODIUM 40 MG: 40 TABLET, DELAYED RELEASE ORAL at 05:18

## 2023-02-06 RX ADMIN — OXYCODONE AND ACETAMINOPHEN 1 TABLET: 5; 325 TABLET ORAL at 18:47

## 2023-02-06 RX ADMIN — SODIUM CHLORIDE, PRESERVATIVE FREE 10 ML: 5 INJECTION INTRAVENOUS at 19:55

## 2023-02-06 RX ADMIN — OXYCODONE AND ACETAMINOPHEN 1 TABLET: 5; 325 TABLET ORAL at 22:47

## 2023-02-06 RX ADMIN — DICYCLOMINE HYDROCHLORIDE 20 MG: 10 CAPSULE ORAL at 11:15

## 2023-02-06 RX ADMIN — HYDROMORPHONE HYDROCHLORIDE 0.5 MG: 1 INJECTION, SOLUTION INTRAMUSCULAR; INTRAVENOUS; SUBCUTANEOUS at 19:54

## 2023-02-06 RX ADMIN — HYDROMORPHONE HYDROCHLORIDE 1 MG: 1 INJECTION, SOLUTION INTRAMUSCULAR; INTRAVENOUS; SUBCUTANEOUS at 06:43

## 2023-02-06 RX ADMIN — METRONIDAZOLE 500 MG: 500 INJECTION, SOLUTION INTRAVENOUS at 05:06

## 2023-02-06 RX ADMIN — HYDROMORPHONE HYDROCHLORIDE 1 MG: 1 INJECTION, SOLUTION INTRAMUSCULAR; INTRAVENOUS; SUBCUTANEOUS at 10:18

## 2023-02-06 RX ADMIN — DICYCLOMINE HYDROCHLORIDE 10 MG: 10 CAPSULE ORAL at 05:19

## 2023-02-06 RX ADMIN — DICYCLOMINE HYDROCHLORIDE 20 MG: 10 CAPSULE ORAL at 15:40

## 2023-02-06 RX ADMIN — HYDROMORPHONE HYDROCHLORIDE 1 MG: 1 INJECTION, SOLUTION INTRAMUSCULAR; INTRAVENOUS; SUBCUTANEOUS at 00:31

## 2023-02-06 RX ADMIN — SODIUM CHLORIDE, PRESERVATIVE FREE 10 ML: 5 INJECTION INTRAVENOUS at 10:25

## 2023-02-06 RX ADMIN — HYDROMORPHONE HYDROCHLORIDE 1 MG: 1 INJECTION, SOLUTION INTRAMUSCULAR; INTRAVENOUS; SUBCUTANEOUS at 03:38

## 2023-02-06 RX ADMIN — DIPHENHYDRAMINE HYDROCHLORIDE 12.5 MG: 50 INJECTION, SOLUTION INTRAMUSCULAR; INTRAVENOUS at 10:24

## 2023-02-06 ASSESSMENT — PAIN DESCRIPTION - DESCRIPTORS
DESCRIPTORS: DISCOMFORT;SORE
DESCRIPTORS: SORE
DESCRIPTORS: STABBING;ACHING
DESCRIPTORS: ACHING
DESCRIPTORS: SORE;ACHING
DESCRIPTORS: ACHING;DISCOMFORT
DESCRIPTORS: STABBING
DESCRIPTORS: ACHING;DISCOMFORT;SORE
DESCRIPTORS: SORE;DISCOMFORT
DESCRIPTORS: DISCOMFORT;SORE
DESCRIPTORS: ACHING;STABBING
DESCRIPTORS: ACHING;DISCOMFORT
DESCRIPTORS: ACHING;DISCOMFORT
DESCRIPTORS: ACHING;DISCOMFORT;SORE

## 2023-02-06 ASSESSMENT — PAIN DESCRIPTION - ORIENTATION
ORIENTATION: RIGHT;LEFT;LOWER;MID
ORIENTATION: RIGHT
ORIENTATION: RIGHT;LOWER
ORIENTATION: RIGHT
ORIENTATION: RIGHT;LEFT
ORIENTATION: RIGHT;LEFT;MID
ORIENTATION: MID
ORIENTATION: MID;LOWER
ORIENTATION: RIGHT
ORIENTATION: RIGHT;LEFT;LOWER;MID
ORIENTATION: LEFT;RIGHT;MID
ORIENTATION: LEFT;RIGHT;LOWER;MID
ORIENTATION: LOWER;MID;LEFT;RIGHT
ORIENTATION: RIGHT;LOWER

## 2023-02-06 ASSESSMENT — PAIN - FUNCTIONAL ASSESSMENT
PAIN_FUNCTIONAL_ASSESSMENT: PREVENTS OR INTERFERES SOME ACTIVE ACTIVITIES AND ADLS

## 2023-02-06 ASSESSMENT — PAIN SCALES - GENERAL
PAINLEVEL_OUTOF10: 9
PAINLEVEL_OUTOF10: 10
PAINLEVEL_OUTOF10: 8
PAINLEVEL_OUTOF10: 0
PAINLEVEL_OUTOF10: 9
PAINLEVEL_OUTOF10: 9
PAINLEVEL_OUTOF10: 0
PAINLEVEL_OUTOF10: 9
PAINLEVEL_OUTOF10: 10

## 2023-02-06 ASSESSMENT — PAIN SCALES - WONG BAKER
WONGBAKER_NUMERICALRESPONSE: 0
WONGBAKER_NUMERICALRESPONSE: 0

## 2023-02-06 ASSESSMENT — PAIN DESCRIPTION - PAIN TYPE
TYPE: ACUTE PAIN;SURGICAL PAIN
TYPE: ACUTE PAIN;SURGICAL PAIN
TYPE: ACUTE PAIN
TYPE: ACUTE PAIN;SURGICAL PAIN

## 2023-02-06 ASSESSMENT — PAIN DESCRIPTION - LOCATION
LOCATION: VAGINA
LOCATION: ABDOMEN
LOCATION: ABDOMEN;VAGINA
LOCATION: ABDOMEN
LOCATION: ABDOMEN;VAGINA
LOCATION: VAGINA
LOCATION: ABDOMEN;VAGINA
LOCATION: VAGINA
LOCATION: GROIN
LOCATION: ABDOMEN;VAGINA
LOCATION: VAGINA;ABDOMEN
LOCATION: ABDOMEN;GROIN

## 2023-02-06 ASSESSMENT — PAIN DESCRIPTION - ONSET
ONSET: ON-GOING

## 2023-02-06 ASSESSMENT — PAIN DESCRIPTION - FREQUENCY
FREQUENCY: CONTINUOUS

## 2023-02-06 NOTE — CARE COORDINATION
[facility]    Referral received from CM to follow for home care services. [facility] is OON , will require alternate agency, no preference, CM aware. Referral accepted by [facility] with [facility], will pull from [facility].      Fransico Hidalgo RN, BSN CTN  [facility] [phone]

## 2023-02-06 NOTE — TELEPHONE ENCOUNTER
Patient cannot make appt for 2/7, still in hospital. Patient requesting someone from our office come to visit her.  She has questions and no getting any answers - la

## 2023-02-06 NOTE — PLAN OF CARE
Problem: Discharge Planning  Goal: Discharge to home or other facility with appropriate resources  Outcome: Progressing  Flowsheets (Taken 2/4/2023 2055 by Daniela Alejandro RN)  Discharge to home or other facility with appropriate resources:   Identify barriers to discharge with patient and caregiver   Arrange for needed discharge resources and transportation as appropriate   Identify discharge learning needs (meds, wound care, etc)   Arrange for interpreters to assist at discharge as needed   Refer to discharge planning if patient needs post-hospital services based on physician order or complex needs related to functional status, cognitive ability or social support system     Problem: Pain  Goal: Verbalizes/displays adequate comfort level or baseline comfort level  2/6/2023 0801 by Yareli Dee RN  Outcome: Progressing  Flowsheets (Taken 2/5/2023 2309 by Pranay Lane RN)  Verbalizes/displays adequate comfort level or baseline comfort level:   Encourage patient to monitor pain and request assistance   Assess pain using appropriate pain scale   Administer analgesics based on type and severity of pain and evaluate response   Implement non-pharmacological measures as appropriate and evaluate response   Consider cultural and social influences on pain and pain management   Notify Licensed Independent Practitioner if interventions unsuccessful or patient reports new pain  2/5/2023 2309 by Pranay Lane RN  Flowsheets (Taken 2/5/2023 2309)  Verbalizes/displays adequate comfort level or baseline comfort level:   Encourage patient to monitor pain and request assistance   Assess pain using appropriate pain scale   Administer analgesics based on type and severity of pain and evaluate response   Implement non-pharmacological measures as appropriate and evaluate response   Consider cultural and social influences on pain and pain management   Notify Licensed Independent Practitioner if interventions unsuccessful or patient reports new pain     Problem: Safety - Adult  Goal: Free from fall injury  Outcome: Progressing  Flowsheets (Taken 2/6/2023 0801)  Free From Fall Injury: Instruct family/caregiver on patient safety

## 2023-02-06 NOTE — PLAN OF CARE
Problem: Pain  Goal: Verbalizes/displays adequate comfort level or baseline comfort level  2/5/2023 2309 by Adriel Fuentes RN  Flowsheets (Taken 2/5/2023 2309)  Verbalizes/displays adequate comfort level or baseline comfort level:   Encourage patient to monitor pain and request assistance   Assess pain using appropriate pain scale   Administer analgesics based on type and severity of pain and evaluate response   Implement non-pharmacological measures as appropriate and evaluate response   Consider cultural and social influences on pain and pain management   Notify Licensed Independent Practitioner if interventions unsuccessful or patient reports new pain  2/5/2023 0942 by Ella Kearns RN  Outcome: Progressing

## 2023-02-06 NOTE — PROGRESS NOTES
Physical Therapy    Status Note  Trip Turner  [id number]  1635    Therapy orders noted and chart briefly reviewed. Therapist to patient room. Patient up in room, ambulating slowly, but with control, without device. Patient states she knows she needs to move, and she is doing what she can. She states all mobility takes extra time due to soreness at lower abdomen and vaginal region. Expresses frustration with her current limitations and medical issues. Therapist gently encourages progressive mobility to tolerance. Patient verbalizes understanding. Formal physical therapy not indicated for patient at this time. Will cancel current orders. Patient provided with ice pack for tasneem area. BRO York Page informed.      Electronically signed by Ren De La Cruz, [address] [place] [address] Drive (#[phone])  on 2/6/2023 at 5:01 PM

## 2023-02-06 NOTE — PROGRESS NOTES
Hospitalist Progress Note      PCP: LINA Oliva CNP    Date of Admission: 2/1/2023      Hospital Course:     Impression:     Johnathon Baxter is a 40 y.o. female who presented with PMHx Hepatitis C, restless leg syndrome and recent surgery on Monday for removal of suburethral sling, vaginal exploration, abdominal exploration. Presented to the ED with c/o lower abdominal pain since surgery with bruising and swelling over suprapubic area, and then Tuesday developed right upper quadrant pain. Admitted for possible cholecystitis. Found to have chronic hepatitis as well as labial hematoma. 2/6 we are weaning from iv pain control to oral.     A/P:    Abdominal pain: improved relative to admission. Patient complains of both surgical site discomfort as well as RUQ pain intermittently. 2/4 still having abdominal pain, I have adjusted her PPI provided GI cocktail. 2/6 improved, adjusting bentyl. 2. Possible Gallbladder irritation related to chronic hepatitis: rocephin flagyl, gen surg consulted. 2/3 appreciate gen surg eval, no intervention at this time. 3. Labial hematoma S/p urethral sling removal: appreciate urology eval, will manage hematoma conservatively. 2/3 patient is understandably distressed about her swelling, we will monitor this and treat conservatively, appreciate urogyn eval. 2/4 will ask urogyn to eval again due to complaint of bleed, hematoma does not appear ruptured at the time of my evaluation. 2/6 oupt follow up with uro gyn. 4. Pruritis: possibly related to drug rxn, will change levaquin to rocephin. 2/4 resolved. Prn benadryl may remain. 5. Elevated LFT hx  hep c: GI consulted. Trend liver functions. 2/5 outpt GI f/u    Code status: full code    Dvt ppx: mechanical    Transition of care: home    ADOD: 2/7    24 hour care plan:    Cont abx, cont pain control and prn benadryl. Conservative mgmt on pelvic site. Weaning from IV pain medication today.   Discharge home in AM.       Medications:  Reviewed    Infusion Medications    sodium chloride       Scheduled Medications    dicyclomine  20 mg Oral TID AC    pantoprazole  40 mg Oral BID AC    lidocaine 1 % injection  5 mL IntraDERmal Once    sodium chloride flush  5-40 mL IntraVENous 2 times per day    rOPINIRole  0.5 mg Oral Nightly     PRN Meds: oxyCODONE-acetaminophen, HYDROmorphone, perflutren lipid microspheres, sodium chloride flush, sodium chloride, ondansetron **OR** ondansetron, polyethylene glycol, acetaminophen **OR** acetaminophen, diphenhydrAMINE, magnesium sulfate      Intake/Output Summary (Last 24 hours) at 2/6/2023 1241  Last data filed at 2/6/2023 1023  Gross per 24 hour   Intake 870 ml   Output --   Net 870 ml         Exam:    BP (!) 90/54   Pulse 69   Temp 98.4 °F (36.9 °C) (Oral)   Resp 18   Ht 5' 3\" (1.6 m)   Wt 187 lb 6.3 oz (85 kg)   SpO2 93%   BMI 33.19 kg/m²     General appearance: No apparent distress, appears stated age and cooperative. HEENT: Pupils equal, round, and reactive to light. Conjunctivae/corneas clear. Neck: Supple, with full range of motion. No jugular venous distention. Trachea midline. Respiratory:  Normal respiratory effort. Clear to auscultation, bilaterally without Rales/Wheezes/Rhonchi. Cardiovascular: Regular rate and rhythm with normal S1/S2 without murmurs, rubs or gallops. Abdomen: Soft, non-tender, non-distended with normal bowel sounds. Musculoskeletal: No clubbing, cyanosis or edema bilaterally. Full range of motion without deformity. Skin: Skin color, texture, turgor normal.  No rashes or lesions. Neurologic:  Neurovascularly intact without any focal sensory/motor deficits.  Cranial nerves: II-XII intact, grossly non-focal.  Psychiatric: Alert and oriented, thought content appropriate, normal insight  Capillary Refill: Brisk,< 3 seconds   Peripheral Pulses: +2 palpable, equal bilaterally       Labs:   Recent Labs     02/04/23  1140 02/05/23  0530 02/06/23  0500 WBC 3.8* 3.9* 3.6*   HGB 10.1* 10.0* 9.9*   HCT 31.1* 29.3* 30.2*   * 104* 113*       Recent Labs     02/04/23  1140 02/05/23  0530 02/06/23  0500    137 138   K 3.9 3.7 3.8    104 105   CO2 25 25 25   BUN 8 7 7   CREATININE 0.5* 0.6 0.6   CALCIUM 8.3 8.4 8.3       Recent Labs     02/04/23  1140 02/05/23  0530 02/06/23  0500   * 115* 105*   * 132* 122*   BILITOT 0.6 0.4 0.4   ALKPHOS 90 83 83       No results for input(s): INR in the last 72 hours. Recent Labs     02/05/23  1550   CKTOTAL 43         Urinalysis:      Lab Results   Component Value Date/Time    NITRU Negative 02/01/2023 06:11 PM    NITRU neg 09/07/2022 12:00 AM    WBCUA 42 02/01/2023 06:11 PM    BACTERIA 3+ 02/01/2023 06:11 PM    RBCUA 32 02/01/2023 06:11 PM    BLOODU LARGE 02/01/2023 06:11 PM    SPECGRAV 1.020 02/01/2023 06:11 PM    GLUCOSEU Negative 02/01/2023 06:11 PM    GLUCOSEU NEGATIVE 05/29/2012 11:35 PM       Radiology:  US ABDOMEN LIMITED   Final Result   1. Mild gallbladder wall thickening and trace pericholecystic fluid without   sonographic Schaffer's sign or stones favored to be related to underlying liver   dysfunction. Acute acalculous cholecystitis is in the differential but felt   to be less likely. If there is persistent clinical suspicion for acute   cholecystitis, recommend HIDA scan. 2. Changes of diffuse liver disease. Clinical correlation advised. CT ABDOMEN PELVIS W IV CONTRAST Additional Contrast? None   Final Result   1. Moderate gallbladder wall thickening with small pericholecystic fluid. This may reflect acute cholecystitis versus reactive change to underlying   diffuse liver disease. 2. Suspected diffuse liver disease with mild splenomegaly. Clinical   correlation advised. 3. Subcutaneous fat stranding of the low anterior abdominal wall with high   density ovoid lesion versus collection in the pubic region measuring 3.1 cm,   possible hematoma.   This could be better evaluated with limited ultrasound. Recommend imaging follow-up to resolution. 4. Simple cystic lesion of the left adnexa measuring 7.4 cm. Recommendation   below. 5. Scattered subcentimeter and pericentimeter short axis retroperitoneal and   upper abdominal the lymph nodes, indeterminate. RECOMMENDATIONS:   7.4 cm left adnexal simple-appearing cyst. Recommend follow-up pelvic US in   3-6 months. Reference: JACR 2020 Feb;17(2):248-254            CT CHEST PULMONARY EMBOLISM W CONTRAST   Final Result   No evidence of pulmonary embolism or aortic dissection. Small bilateral pleural effusions. Mild adjacent airspace disease is likely   passive atelectasis, but pneumonia and edema remain considerations. Cirrhotic morphology of the liver, with splenomegaly. Upper abdominal   lymphadenopathy, incompletely evaluated.                  Assessment/Plan:    Active Hospital Problems    Diagnosis Date Noted    Adnexal cyst [N94.9] 02/02/2023     Priority: Medium    Abdominal pain [R10.9] 02/01/2023     Priority: Medium    Hypomagnesemia [E83.42] 02/01/2023     Priority: Medium    Thrombocytopenia ([address] [D69.6] 02/01/2023     Priority: Medium    Cholecystitis [K81.9] 02/01/2023     Priority: [address] [facility]

## 2023-02-06 NOTE — CARE COORDINATION
2/6 met with patient to discuss possible need for home care at discharge- she is agreeable and chooses [address] - if they are unable to accept [facility] - she will follow . The Plan for Transition of Care is related to the following treatment goals: home    The Patient and/or patient representative  was provided with a choice of provider and agrees   with the discharge plan. [x] Yes [] No    Freedom of choice list was provided with basic dialogue that supports the patient's individualized plan of care/goals, treatment preferences and shares the quality data associated with the providers.  [x] Yes [] No    Electronically signed by Kelby Saleh on 2/6/2023 at 1:34 PM  #[phone]

## 2023-02-06 NOTE — PROGRESS NOTES
Patient medicated for abdomen/groin pain level 10 out of 10 with Dilaudid  1mg IVP. Patient also medicated with Benadryl 12.5 mg  IVP for itching . Fall Precautions in place.  Electronically signed by Jai Painter RN on 2/5/2023 at 11:16 PM

## 2023-02-07 VITALS
TEMPERATURE: 98.3 F | SYSTOLIC BLOOD PRESSURE: 102 MMHG | HEIGHT: 63 IN | RESPIRATION RATE: 16 BRPM | HEART RATE: 58 BPM | OXYGEN SATURATION: 98 % | DIASTOLIC BLOOD PRESSURE: 66 MMHG | BODY MASS INDEX: 33.2 KG/M2 | WEIGHT: 187.39 LBS

## 2023-02-07 LAB
A/G RATIO: 1 (ref 1.1–2.2)
ALBUMIN SERPL-MCNC: 3.1 G/DL (ref 3.4–5)
ALP BLD-CCNC: 83 U/L (ref 40–129)
ALT SERPL-CCNC: 113 U/L (ref 10–40)
ANION GAP SERPL CALCULATED.3IONS-SCNC: 6 MMOL/L (ref 3–16)
AST SERPL-CCNC: 98 U/L (ref 15–37)
BACTERIA: ABNORMAL /HPF
BILIRUB SERPL-MCNC: 0.4 MG/DL (ref 0–1)
BILIRUBIN URINE: NEGATIVE
BLOOD, URINE: ABNORMAL
BUN BLDV-MCNC: 7 MG/DL (ref 7–20)
CALCIUM SERPL-MCNC: 8.6 MG/DL (ref 8.3–10.6)
CHLORIDE BLD-SCNC: 102 MMOL/L (ref 99–110)
CLARITY: ABNORMAL
CO2: 26 MMOL/L (ref 21–32)
COLOR: ABNORMAL
COMMENT UA: ABNORMAL
CREAT SERPL-MCNC: 0.7 MG/DL (ref 0.6–1.1)
EPITHELIAL CELLS, UA: ABNORMAL /HPF (ref 0–5)
GFR SERPL CREATININE-BSD FRML MDRD: >60 ML/MIN/{1.73_M2}
GLUCOSE BLD-MCNC: 87 MG/DL (ref 70–99)
GLUCOSE URINE: NEGATIVE MG/DL
HCT VFR BLD CALC: 30.8 % (ref 36–48)
HEMOGLOBIN: 10.2 G/DL (ref 12–16)
KETONES, URINE: NEGATIVE MG/DL
LEUKOCYTE ESTERASE, URINE: ABNORMAL
MCH RBC QN AUTO: 28.2 PG (ref 26–34)
MCHC RBC AUTO-ENTMCNC: 33.2 G/DL (ref 31–36)
MCV RBC AUTO: 85 FL (ref 80–100)
MICROSCOPIC EXAMINATION: YES
NITRITE, URINE: NEGATIVE
PDW BLD-RTO: 14.8 % (ref 12.4–15.4)
PH UA: 6.5 (ref 5–8)
PLATELET # BLD: 105 K/UL (ref 135–450)
PMV BLD AUTO: 7.9 FL (ref 5–10.5)
POTASSIUM REFLEX MAGNESIUM: 3.7 MMOL/L (ref 3.5–5.1)
PROTEIN UA: NEGATIVE MG/DL
RBC # BLD: 3.63 M/UL (ref 4–5.2)
RBC UA: ABNORMAL /HPF (ref 0–4)
SODIUM BLD-SCNC: 134 MMOL/L (ref 136–145)
SPECIFIC GRAVITY UA: 1.01 (ref 1–1.03)
TOTAL PROTEIN: 6.2 G/DL (ref 6.4–8.2)
URINE REFLEX TO CULTURE: ABNORMAL
URINE TYPE: ABNORMAL
UROBILINOGEN, URINE: 1 E.U./DL
WBC # BLD: 3 K/UL (ref 4–11)
WBC UA: ABNORMAL /HPF (ref 0–5)

## 2023-02-07 PROCEDURE — 81001 URINALYSIS AUTO W/SCOPE: CPT

## 2023-02-07 PROCEDURE — 6370000000 HC RX 637 (ALT 250 FOR IP): Performed by: FAMILY MEDICINE

## 2023-02-07 PROCEDURE — 9990000010 HC NO CHARGE VISIT

## 2023-02-07 PROCEDURE — 6360000002 HC RX W HCPCS: Performed by: FAMILY MEDICINE

## 2023-02-07 PROCEDURE — 80053 COMPREHEN METABOLIC PANEL: CPT

## 2023-02-07 PROCEDURE — 94760 N-INVAS EAR/PLS OXIMETRY 1: CPT

## 2023-02-07 PROCEDURE — 2580000003 HC RX 258: Performed by: NURSE PRACTITIONER

## 2023-02-07 PROCEDURE — 85027 COMPLETE CBC AUTOMATED: CPT

## 2023-02-07 RX ORDER — DICYCLOMINE HYDROCHLORIDE 10 MG/1
20 CAPSULE ORAL
Qty: 120 CAPSULE | Refills: 3 | Status: SHIPPED | OUTPATIENT
Start: 2023-02-07 | End: 2023-02-12

## 2023-02-07 RX ORDER — PANTOPRAZOLE SODIUM 40 MG/1
40 TABLET, DELAYED RELEASE ORAL DAILY
Qty: 30 TABLET | Refills: 3 | Status: SHIPPED | OUTPATIENT
Start: 2023-02-07 | End: 2023-03-09

## 2023-02-07 RX ADMIN — OXYCODONE AND ACETAMINOPHEN 1 TABLET: 5; 325 TABLET ORAL at 14:27

## 2023-02-07 RX ADMIN — OXYCODONE AND ACETAMINOPHEN 1 TABLET: 5; 325 TABLET ORAL at 05:30

## 2023-02-07 RX ADMIN — DICYCLOMINE HYDROCHLORIDE 20 MG: 10 CAPSULE ORAL at 05:30

## 2023-02-07 RX ADMIN — OXYCODONE AND ACETAMINOPHEN 1 TABLET: 5; 325 TABLET ORAL at 10:18

## 2023-02-07 RX ADMIN — DICYCLOMINE HYDROCHLORIDE 20 MG: 10 CAPSULE ORAL at 10:18

## 2023-02-07 RX ADMIN — HYDROMORPHONE HYDROCHLORIDE 0.5 MG: 1 INJECTION, SOLUTION INTRAMUSCULAR; INTRAVENOUS; SUBCUTANEOUS at 01:34

## 2023-02-07 RX ADMIN — SODIUM CHLORIDE, PRESERVATIVE FREE 10 ML: 5 INJECTION INTRAVENOUS at 10:15

## 2023-02-07 RX ADMIN — PANTOPRAZOLE SODIUM 40 MG: 40 TABLET, DELAYED RELEASE ORAL at 05:30

## 2023-02-07 ASSESSMENT — PAIN SCALES - WONG BAKER
WONGBAKER_NUMERICALRESPONSE: 0
WONGBAKER_NUMERICALRESPONSE: 0

## 2023-02-07 ASSESSMENT — PAIN DESCRIPTION - DESCRIPTORS
DESCRIPTORS: SORE
DESCRIPTORS: ACHING;DISCOMFORT
DESCRIPTORS: SORE;DISCOMFORT
DESCRIPTORS: BURNING;ACHING
DESCRIPTORS: ACHING

## 2023-02-07 ASSESSMENT — PAIN SCALES - GENERAL
PAINLEVEL_OUTOF10: 10
PAINLEVEL_OUTOF10: 10
PAINLEVEL_OUTOF10: 0
PAINLEVEL_OUTOF10: 0
PAINLEVEL_OUTOF10: 10
PAINLEVEL_OUTOF10: 0
PAINLEVEL_OUTOF10: 8

## 2023-02-07 ASSESSMENT — PAIN DESCRIPTION - ONSET
ONSET: ON-GOING

## 2023-02-07 ASSESSMENT — PAIN DESCRIPTION - LOCATION
LOCATION: GENERALIZED
LOCATION: VAGINA

## 2023-02-07 ASSESSMENT — PAIN DESCRIPTION - PAIN TYPE
TYPE: ACUTE PAIN
TYPE: SURGICAL PAIN
TYPE: SURGICAL PAIN

## 2023-02-07 ASSESSMENT — PAIN DESCRIPTION - ORIENTATION
ORIENTATION: RIGHT;LOWER
ORIENTATION: RIGHT
ORIENTATION: MID
ORIENTATION: RIGHT;LOWER
ORIENTATION: RIGHT

## 2023-02-07 ASSESSMENT — PAIN DESCRIPTION - FREQUENCY
FREQUENCY: CONTINUOUS

## 2023-02-07 NOTE — CARE COORDINATION
DISCHARGE SUMMARY     DATE OF DISCHARGE: 2/7/2023    DISCHARGE DESTINATION: home      HOME CARE AGENCY:              Discharging to Facility/ Agency   Name: [facility]  Address:   [address], [facility] [place] [address]  Phone:  [phone]  Fax:  [phone]    TRANSPORTATION:   Family    COMMENTS: Spoke with patient regarding discharge today with family and [facility]. She is agreeable to the plan. Spoke with Carlene Willoughby at [facility]. She will pull orders from epic.     Electronically signed by YARA Klein, LISW, Case Management on 2/7/2023 at [address] [phone]

## 2023-02-07 NOTE — PROGRESS NOTES
Pt evening shift assessment complete. VSS and medication given as ordered. Pt assessed for comfort needs, given pain medication per pt request. Pt does not express any additional needs at this time, resting comfortably in bed.

## 2023-02-07 NOTE — DISCHARGE SUMMARY
Hospital Medicine Discharge Summary    Patient ID: Guanaco Do      Patient's PCP: Madison Benedict, APRN - CNP    Admit Date: 2/1/2023     Discharge Date:   2/7/2023    Admitting Physician: Bessy Correa MD     Discharge Physician: Vani Vargas DO     Discharge Diagnoses: Active Hospital Problems    Diagnosis Date Noted    Adnexal cyst [N94.9] 02/02/2023     Priority: Medium    Abdominal pain [R10.9] 02/01/2023     Priority: Medium    Hypomagnesemia [E83.42] 02/01/2023     Priority: Medium    Thrombocytopenia ([address] [D69.6] 02/01/2023     Priority: Medium    Cholecystitis [K81.9] 02/01/2023     Priority: Medium       The patient was seen and examined on day of discharge and this discharge summary is in conjunction with any daily progress note from day of discharge. Hospital Course:     40 y.o. female who presented with PMHx Hepatitis C, restless leg syndrome and recent surgery on Monday for removal of suburethral sling, vaginal exploration, abdominal exploration. Presented to the ED with c/o lower abdominal pain since surgery with bruising and swelling over suprapubic area, and then Tuesday developed right upper quadrant pain. Admitted for possible cholecystitis. Found to have chronic hepatitis as well as labial hematoma. Gen surg had seen the patient, acute cholecystitis was excluded. She was seen by GI who recommended outpatient follow up for her chronic Hep C. Urogynecologic surgery had seen the patient, most her pain is suspected to be part of her post operative recovery. Clinically she seemed better and her labial hematoma seemed stable. She will be discharged home in fair condition. Follow up in surgical office this week. Exam:     /66   Pulse 58   Temp 98.3 °F (36.8 °C)   Resp 16   Ht 5' 3\" (1.6 m)   Wt 187 lb 6.3 oz (85 kg)   SpO2 95%   BMI 33.19 kg/m²       General appearance:  No apparent distress, appears stated age and cooperative.   HEENT:  Normal cephalic, atraumatic without obvious deformity. Pupils equal, round, and reactive to light. Extra ocular muscles intact. Conjunctivae/corneas clear. Neck: Supple, with full range of motion. No jugular venous distention. Trachea midline. Respiratory:  Normal respiratory effort. Clear to auscultation, bilaterally without Rales/Wheezes/Rhonchi. Cardiovascular:  Regular rate and rhythm with normal S1/S2 without murmurs, rubs or gallops. Abdomen: Soft, non-tender, non-distended with normal bowel sounds. Musculoskeletal:  No clubbing, cyanosis or edema bilaterally. Full range of motion without deformity. Skin: Skin color, texture, turgor normal.  No rashes or lesions. Neurologic:  Neurovascularly intact without any focal sensory/motor deficits. Cranial nerves: II-XII intact, grossly non-focal.  Psychiatric:  Alert and oriented, thought content appropriate, normal insight  Capillary Refill: Brisk,< 3 seconds   Peripheral Pulses: +2 palpable, equal bilaterally       Labs: For convenience and continuity at follow-up the following most recent labs are provided:      CBC:    Lab Results   Component Value Date/Time    WBC 3.0 02/07/2023 05:35 AM    HGB 10.2 02/07/2023 05:35 AM    HCT 30.8 02/07/2023 05:35 AM     02/07/2023 05:35 AM       Renal:    Lab Results   Component Value Date/Time     02/07/2023 05:35 AM    K 3.7 02/07/2023 05:35 AM     02/07/2023 05:35 AM    CO2 26 02/07/2023 05:35 AM    BUN 7 02/07/2023 05:35 AM    CREATININE 0.7 02/07/2023 05:35 AM    CALCIUM 8.6 02/07/2023 05:35 AM         Significant Diagnostic Studies    Radiology:   US ABDOMEN LIMITED   Final Result   1. Mild gallbladder wall thickening and trace pericholecystic fluid without   sonographic Schaffer's sign or stones favored to be related to underlying liver   dysfunction. Acute acalculous cholecystitis is in the differential but felt   to be less likely.   If there is persistent clinical suspicion for acute   cholecystitis, recommend HIDA scan. 2. Changes of diffuse liver disease. Clinical correlation advised. CT ABDOMEN PELVIS W IV CONTRAST Additional Contrast? None   Final Result   1. Moderate gallbladder wall thickening with small pericholecystic fluid. This may reflect acute cholecystitis versus reactive change to underlying   diffuse liver disease. 2. Suspected diffuse liver disease with mild splenomegaly. Clinical   correlation advised. 3. Subcutaneous fat stranding of the low anterior abdominal wall with high   density ovoid lesion versus collection in the pubic region measuring 3.1 cm,   possible hematoma. This could be better evaluated with limited ultrasound. Recommend imaging follow-up to resolution. 4. Simple cystic lesion of the left adnexa measuring 7.4 cm. Recommendation   below. 5. Scattered subcentimeter and pericentimeter short axis retroperitoneal and   upper abdominal the lymph nodes, indeterminate. RECOMMENDATIONS:   7.4 cm left adnexal simple-appearing cyst. Recommend follow-up pelvic US in   3-6 months. Reference: JACR 2020 Feb;17(2):248-254            CT CHEST PULMONARY EMBOLISM W CONTRAST   Final Result   No evidence of pulmonary embolism or aortic dissection. Small bilateral pleural effusions. Mild adjacent airspace disease is likely   passive atelectasis, but pneumonia and edema remain considerations. Cirrhotic morphology of the liver, with splenomegaly. Upper abdominal   lymphadenopathy, incompletely evaluated. Consults:     IP CONSULT TO GENERAL SURGERY  IP CONSULT TO GENERAL SURGERY  IP CONSULT TO GI  IP CONSULT TO OB GYN  IP CONSULT TO HOME CARE NEEDS    Disposition:  home    Condition at Discharge: Stable    Discharge Instructions/Follow-up:  pcp and urogyn surg as indicated.      Code Status:  Full Code      Activity: activity as tolerated    Diet: regular diet      Discharge Medications:     Current Discharge Medication List Details   dicyclomine (BENTYL) 10 MG capsule Take 2 capsules by mouth 3 times daily (before meals) for 5 days  Qty: 120 capsule, Refills: 3      pantoprazole (PROTONIX) 40 MG tablet Take 1 tablet by mouth daily  Qty: 30 tablet, Refills: 3             Time Spent on discharge is more than 45 minutes in the examination, evaluation, counseling and review of medications and discharge plan. Signed: Beata Dover DO   2/7/2023      Thank you Cydne Boxer, APRN - CNP for the opportunity to be involved in this patient's care. If you have any questions or concerns please feel free to contact me at [phone].

## 2023-02-07 NOTE — DISCHARGE INSTR - COC
Continuity of Care Form    Patient Name: Metro Dates   :  1985  MRN:  [id number]    Admit date:  2023  Discharge date:  2023    Code Status Order: Full Code   Advance Directives:     Admitting Physician:  Lonn Severe, MD  PCP: Milagro Kearns APRN - CNP    Discharging Nurse: [facility] Unit/Room#: I3M-4759/3119-01  Discharging Unit Phone Number: [phone]    Emergency Contact:   Extended Emergency Contact Information  Primary Emergency Contact: Lissa Russo   [address] Phone: [phone]  Relation: Brother/Sister    Past Surgical History:  Past Surgical History:   Procedure Laterality Date    CYSTOSCOPY N/A 2022    CYSTOSCOPY performed by Cristela Blizzard, MD at [address] [place] [address]  02/10/2016    diagnostic laparoscopy, KTP laser of endometriosis     TUBAL LIGATION      URETHRAL SURGERY N/A 2022    RETROPUBIC SLING performed by Cristela Blizzard, MD at [facility] 2023    REMOVAL OF SUBURETHRAL SLING, VAGINAL EXPLORATION, ABDOMINAL EXPLORATION performed by Cristela Blizzard, MD at [place] [address]       Immunization History:   Immunization History   Administered Date(s) Administered    COVID-19, PFIZER PURPLE top, DILUTE for use, (age 15 y+), 30mcg/0.3mL 2021    Influenza Virus Vaccine 10/01/2018    Influenza Whole 10/09/2015       Active Problems:  Patient Active Problem List   Diagnosis Code    GSI (genuine stress incontinence), female N39.3    Abnormal finding on urinalysis R82.90    Bipolar I disorder, most recent episode (or current) depressed, severe, specified as with psychotic behavior ([facility] [address] F31.5    Chronic hepatitis C without hepatic coma (HCC) B18.2    Dysmenorrhea N94.6    History of heroin abuse (HCC) F11.11    Allergic reaction to substance T78.40XA    Abdominal pain R10.9    Hypomagnesemia E83.42    Thrombocytopenia (HCC) D69.6    Cholecystitis K81.9    Adnexal cyst N94.9 Isolation/Infection:   Isolation            No Isolation          Patient Infection Status       Infection Onset Added Last Indicated Last Indicated By Review Planned Expiration Resolved Resolved By    None active    Resolved    COVID-19 22 COVID-19   22     COVID-19 (Rule Out) 21 COVID-19, Rapid (Ordered)   21 Rule-Out Test Resulted            Nurse Assessment:  Last Vital Signs: /66   Pulse 58   Temp 98.3 °F (36.8 °C)   Resp 16   Ht 5' 3\" (1.6 m)   Wt 187 lb 6.3 oz (85 kg)   SpO2 95%   BMI 33.19 kg/m²     Last documented pain score (0-10 scale): Pain Level: 0  Last Weight:   Wt Readings from Last 1 Encounters:   23 187 lb 6.3 oz (85 kg)     Mental Status:  oriented, alert, and coherent    IV Access:  - None    Nursing Mobility/ADLs:  Walking   Independent  Transfer  Independent  Bathing  Assisted  Dressing  Assisted  [address]  Independent  Med Delivery   whole    Wound Care Documentation and Therapy:  Incision 23 Abdomen Lower;Medial (Active)   Dressing Status Clean;Dry; Intact 23   Dressing/Treatment Open to air 23   Closure Surgical glue 23   Margins Approximated 23   Incision Assessment Dry 23   Drainage Amount None 23   Odor None 23   Deya-incision Assessment Edematous; Ecchymosis; Warm 23   Number of days: 7       Incision 23 Vagina Inner (Active)   Dressing Status Clean;Dry; Intact 23 0846   Incision Cleansed Cleansed with saline 23 1147   Dressing/Treatment Open to air 23   Closure Other (Comment) 23   Margins Other (Comment) 23   Drainage Amount Scant 23 0400   Drainage Description Sanguinous 23 0400   Odor None 23 0400   Deya-incision Assessment Edematous; Ecchymosis 23   Number of days: 7 Elimination:  Continence: Bowel: Yes  Bladder: Yes  Urinary Catheter: None   Colostomy/Ileostomy/Ileal Conduit: No       Date of Last BM: 2/4/2023    Intake/Output Summary (Last 24 hours) at 2/7/2023 9144  Last data filed at 2/6/2023 1953  Gross per 24 hour   Intake 300 ml   Output --   Net 300 ml     I/O last 3 completed shifts: In: 36 [P.O.:650; I.V.:10; IV Piggyback:100]  Out: -     Safety Concerns:     None    Impairments/Disabilities:      None    Nutrition Therapy:  Current Nutrition Therapy:   - Oral Diet:  General    Routes of Feeding: Oral  Liquids: Thin Liquids  Daily Fluid Restriction: no  Last Modified Barium Swallow with Video (Video Swallowing Test): not done    Treatments at the Time of Hospital Discharge:   Respiratory Treatments: ***  Oxygen Therapy:  is not on home oxygen therapy. Ventilator:    - No ventilator support    Rehab Therapies: {THERAPEUTIC INTERVENTION:7610954798}  Weight Bearing Status/Restrictions: No weight bearing restrictions  Other Medical Equipment (for information only, NOT a DME order):  {EQUIPMENT:[id number]}  Other Treatments: ***    Patient's personal belongings (please select all that are sent with patient):  None    RN SIGNATURE:  Electronically signed by Katie Rocha RN on 2/7/23 at 11:30 AM EST    CASE MANAGEMENT/SOCIAL WORK SECTION    Inpatient Status Date: ***    Readmission Risk Assessment Score:  Readmission Risk              Risk of Unplanned Readmission:  7         Discharging to Facility/ Agency   Name: [facility]  Address:   [place] [address]  Phone:  [phone]  Fax:  [phone]    / signature: Electronically signed by Marizol Curran on 2/7/23 at 11:09 AM EST    PHYSICIAN SECTION    Prognosis: Good    Condition at Discharge: Stable    Rehab Potential (if transferring to Rehab): Good    Recommended Labs or Other Treatments After Discharge: PT and urogyn f/u    Physician Certification: I certify the above information and transfer of Krystyna Gonzalez  is necessary for the continuing treatment of the diagnosis listed and that she requires Home Care for less 30 days.      Update Admission H&P: No change in H&P    PHYSICIAN SIGNATURE:  Electronically signed by Adelina Jordan DO on 2/7/23 at 9:37 AM EST

## 2023-02-07 NOTE — PROGRESS NOTES
Occupational Therapy    02/07/23    Jose Antonio Clement  1985  [id number]    OT orders noted. Patient chart reviewed. Pt has been up independently within room with fxl mobility and ADLs. No acute OT indicated. Will sign off.     Electronically signed by NILDA Shell, OTR/L on 2/7/2023 at 7:37 AM

## 2023-02-07 NOTE — PLAN OF CARE
Problem: Discharge Planning  Goal: Discharge to home or other facility with appropriate resources  2/6/2023 2137 by Kira Hoover RN  Outcome: Progressing  Flowsheets  Taken 2/6/2023 1953 by Kira Hoover RN  Discharge to home or other facility with appropriate resources: Identify barriers to discharge with patient and caregiver  Taken 2/6/2023 1130 by Vicki Benites RN  Discharge to home or other facility with appropriate resources: Identify barriers to discharge with patient and caregiver  2/6/2023 0801 by Vicki Benites RN  Outcome: Progressing  Flowsheets (Taken 2/4/2023 2055 by Luis Manuel Sultana RN)  Discharge to home or other facility with appropriate resources:   Identify barriers to discharge with patient and caregiver   Arrange for needed discharge resources and transportation as appropriate   Identify discharge learning needs (meds, wound care, etc)   Arrange for interpreters to assist at discharge as needed   Refer to discharge planning if patient needs post-hospital services based on physician order or complex needs related to functional status, cognitive ability or social support system     Problem: Pain  Goal: Verbalizes/displays adequate comfort level or baseline comfort level  2/6/2023 2137 by Kira Hoover RN  Outcome: Progressing  2/6/2023 0801 by Vicki Benites RN  Outcome: Progressing  Flowsheets (Taken 2/5/2023 2309 by Britni Schmidt RN)  Verbalizes/displays adequate comfort level or baseline comfort level:   Encourage patient to monitor pain and request assistance   Assess pain using appropriate pain scale   Administer analgesics based on type and severity of pain and evaluate response   Implement non-pharmacological measures as appropriate and evaluate response   Consider cultural and social influences on pain and pain management   Notify Licensed Independent Practitioner if interventions unsuccessful or patient reports new pain     Problem: ABCDS Injury Assessment  Goal: Absence of physical injury  2/6/2023 2137 by Isaac Portillo, RN  Outcome: Progressing  Flowsheets (Taken 2/6/2023 1953)  Absence of Physical Injury: Implement safety measures based on patient assessment  2/6/2023 0801 by Shahzad Wood RN  Outcome: Progressing  Flowsheets (Taken 2/4/2023 2051 by Ruddy Jones RN)  Absence of Physical Injury: Implement safety measures based on patient assessment

## 2023-02-07 NOTE — PROGRESS NOTES
Patient signed and received discharge paperwork. Patient waiting on  to get off work. That is patient transportation home. Will continue to monitor and reassess.

## 2023-02-07 NOTE — PROGRESS NOTES
Patient alert and oriented x4, discharged to home with documented belongings. PICC removed with no complications, gauze and Tegaderm applied. [facility] of [facility] [place] via [place] by hospital transportation. Reviewed discharge, follow up, and medication instructions with patient and patient verbalized understanding. Patient denies any questions or concerns at this time.

## 2023-02-07 NOTE — PROGRESS NOTES
Patient in bed, awake, a&ox4. Patient took medications this morning whole, no issues. Patient tolerating PO intake, no n/v voiced. Patient PICC flushed and capped. Patient voiding however, c/o burning, MD aware. Urinalysis to be collected. Patient seen attending, patient will be discharged. Patient ambulating independently around room. Patient woke up requesting pain medication. Patient having pain in vaginal area, rating 10/10, PRN pain meds given as ordered. Patient able to make needs known, no other needs mentioned. Call light and bedside table within reach. Will continue to monitor and reassess.

## 2023-02-08 LAB — MITOCHONDRIAL M2 AB, IGG: <0.5 U/ML (ref 0–4)

## 2023-02-09 ENCOUNTER — CARE COORDINATION (OUTPATIENT)
Dept: OTHER | Facility: CLINIC | Age: 38
End: 2023-02-09

## 2023-02-10 ENCOUNTER — OFFICE VISIT (OUTPATIENT)
Dept: UROGYNECOLOGY | Age: 38
End: 2023-02-10

## 2023-02-10 VITALS
DIASTOLIC BLOOD PRESSURE: 69 MMHG | HEART RATE: 64 BPM | SYSTOLIC BLOOD PRESSURE: 113 MMHG | RESPIRATION RATE: 17 BRPM | TEMPERATURE: 98.2 F | OXYGEN SATURATION: 98 %

## 2023-02-10 DIAGNOSIS — Z09 POSTOP CHECK: Primary | ICD-10-CM

## 2023-02-10 PROCEDURE — 99024 POSTOP FOLLOW-UP VISIT: CPT | Performed by: OBSTETRICS & GYNECOLOGY

## 2023-02-10 RX ORDER — TRIAMCINOLONE ACETONIDE 1 MG/G
CREAM TOPICAL
COMMUNITY
Start: 2023-01-09

## 2023-02-10 RX ORDER — IBUPROFEN 600 MG/1
600 TABLET ORAL 3 TIMES DAILY
Qty: 30 TABLET | Refills: 0 | Status: SHIPPED | OUTPATIENT
Start: 2023-02-10 | End: 2023-02-20

## 2023-02-10 RX ORDER — AMITRIPTYLINE HYDROCHLORIDE 25 MG/1
25 TABLET, FILM COATED ORAL NIGHTLY
Qty: 30 TABLET | Refills: 1 | Status: SHIPPED | OUTPATIENT
Start: 2023-02-10

## 2023-02-10 RX ORDER — IBUPROFEN 600 MG/1
600 TABLET ORAL 3 TIMES DAILY
Qty: 30 TABLET | Refills: 0 | Status: SHIPPED | OUTPATIENT
Start: 2023-02-10 | End: 2023-02-10

## 2023-02-10 NOTE — PROGRESS NOTES
2/10/2023       HPI:     Name: Ynes Clark  YOB: 1985    CC: Patient is a 40 y.o. presenting for evaluation of pelvic pain, patient here for follow up from hospital, states pain in pelvic area is very bad. HPI: How long have you had this problem?  weeks  Please rate the severity of your problem: severe  Anything make it better? Patient has ice pack in place today. States nothing helps the pain that she is feeling. Ob/Gyn History:    OB History   No obstetric history on file. Past Medical History:   Past Medical History:   Diagnosis Date    Anxiety     Depression     Hepatitis C     asymptomatic    IV drug abuse ([facility] [address]     CLEAN SINCE 2013    Ovarian cyst     Restless leg syndrome     Urinary incontinence 11/01/2022    Wears glasses      Past Surgical History:   Past Surgical History:   Procedure Laterality Date    CYSTOSCOPY N/A 11/09/2022    CYSTOSCOPY performed by Anna Solares MD at [address] [place] [address]  02/10/2016    diagnostic laparoscopy, KTP laser of endometriosis     TUBAL LIGATION      URETHRAL SURGERY N/A 11/09/2022    RETROPUBIC SLING performed by Anna Solares MD at [facility] 1/30/2023    REMOVAL OF SUBURETHRAL SLING, VAGINAL EXPLORATION, ABDOMINAL EXPLORATION performed by Anna Solares MD at [address] [place], [place] [address]way:    Allergies   Allergen Reactions    Kilgore Rash     PER ALLERGY TESTING    Nickel Rash     PER ALLERGY TESTING    Other Itching and Rash     MESH FROM SLING    Penicillins Hives     Current Medications:  Current Outpatient Medications   Medication Sig Dispense Refill    amitriptyline (ELAVIL) 25 MG tablet Take 1 tablet by mouth nightly 30 tablet 1    ibuprofen (ADVIL;MOTRIN) 600 MG tablet Take 1 tablet by mouth three times daily for 10 days 30 tablet 0    dicyclomine (BENTYL) 10 MG capsule Take 2 capsules by mouth 3 times daily (before meals) for 5 days 120 capsule 3    pantoprazole (PROTONIX) 40 MG tablet Take 1 tablet by mouth daily 30 tablet 3    triamcinolone (KENALOG) 0.1 % cream  (Patient not taking: Reported on 2/10/2023)       No current facility-administered medications for this visit. Social History:   Social History     Socioeconomic History    Marital status: Legally      Spouse name: Not on file    Number of children: Not on file    Years of education: Not on file    Highest education level: Not on file   Occupational History    Not on file   Tobacco Use    Smoking status: Former     Packs/day: 0.25     Years: 20.00     Pack years: 5.00     Types: Cigarettes     Quit date: 12/15/2018     Years since quittin.1    Smokeless tobacco: Never    Tobacco comments:     Not smoking at all now   Vaping Use    Vaping Use: Former    Quit date: 2021    Substances: No Nicotine (twice a day), Flavoring    Devices: Disposable   Substance and Sexual Activity    Alcohol use: No    Drug use: Not Currently     Types: IV     Comment: Been clean since 2013    Sexual activity: Yes     Partners: Male   Other Topics Concern    Not on file   Social History Narrative    Not on file     Social Determinants of Health     Financial Resource Strain: Low Risk     Difficulty of Paying Living Expenses: Not hard at all   Food Insecurity: No Food Insecurity    Worried About [address] in the Last Year: Never true    [facility] in the Last Year: Never true   Transportation Needs: No Transportation Needs    Lack of Transportation (Medical): No    Lack of Transportation (Non-Medical): No   Physical Activity: Inactive    Days of Exercise per Week: 0 days    Minutes of Exercise per Session: 0 min   Stress: No Stress Concern Present    Feeling of Stress : Only a little   Social Connections:  Moderately Isolated    Frequency of Communication with Friends and Family: More than three times a week    Frequency of Social Gatherings with Friends and Family: More than three times a week    Attends Denominational Services: Never Active Member of Clubs or Organizations: No    Attends Club or Organization Meetings: Never    Marital Status: Living with partner   Intimate Partner Violence: Not At Risk    Fear of Current or Ex-Partner: No    Emotionally Abused: No    Physically Abused: No    Sexually Abused: No   Housing Stability: Low Risk     Unable to Pay for Housing in the Last Year: No    Number of Places Lived in the Last Year: 1    Unstable Housing in the Last Year: No     Family History:   Family History   Problem Relation Age of Onset    Substance Abuse Mother     Mental Illness Mother     Suicide Mother     No Known Problems Father     Heart Disease Maternal Grandmother     Emphysema Maternal Grandmother     Diabetes Maternal Grandmother     Diabetes Maternal Grandfather     Heart Disease Paternal Grandmother     Pacemaker Paternal Grandmother     Diabetes Paternal Grandfather          Objective:     Vitals  Vitals:    02/10/23 0935   BP: 113/69   Pulse: 64   Resp: 17   Temp: 98.2 °F (36.8 °C)   SpO2: 98%     Physical Exam  Physical Exam  HENT:      Head: Normocephalic and atraumatic. Eyes:      Conjunctiva/sclera: Conjunctivae normal.   Pulmonary:      Effort: Pulmonary effort is normal.   Abdominal:      Palpations: Abdomen is soft. Genitourinary:     Comments: Bruising around the right vulva is much improved. There is still a firmness from a probable hematoma on the right side underneath the Pfannenstiel incision. Musculoskeletal:      Cervical back: Normal range of motion and neck supple. Skin:     General: Skin is warm and dry. Neurological:      Mental Status: She is alert and oriented to person, place, and time. No results found for this visit on 02/10/23. Assessment/Plan:     Chiquita Minaya is a 40 y.o. female with   1. Postop check    Diane  presented as a follow-up from surgery. On a positive note she is doing very well after having the sling removed and is not having any further itching.   She was readmitted to the hospital for pain control and suspected cholecystitis however fortunately this proved to be okay. She struggles with pain control. We had a long and honest discussion today that her pain tolerance has historically been relatively low and that narcotic pain medications do not really help with the pain. She did describe in detail how they would continue to give her Dilaudid and oral pain medication in the hospital but it still did not help with the discomfort even to the point that she started to be drowsy and could not keep her eyes open. She did agree to just use ibuprofen and I added Elavil. We talked about the potential for addiction when using narcotics over the long-term. She was comfortable with this. She also has chronic hepatitis C and is going to follow-up with Dr. Wanda Betancourt of GI who saw her in the hospital.    No orders of the defined types were placed in this encounter.     Orders Placed This Encounter   Medications    DISCONTD: ibuprofen (ADVIL;MOTRIN) 600 MG tablet     Sig: Take 1 tablet by mouth three times daily for 10 days     Dispense:  30 tablet     Refill:  0    amitriptyline (ELAVIL) 25 MG tablet     Sig: Take 1 tablet by mouth nightly     Dispense:  30 tablet     Refill:  1    ibuprofen (ADVIL;MOTRIN) 600 MG tablet     Sig: Take 1 tablet by mouth three times daily for 10 days     Dispense:  30 tablet     Refill:  0       Lee Pleitez MD

## 2023-02-14 ENCOUNTER — OFFICE VISIT (OUTPATIENT)
Dept: UROGYNECOLOGY | Age: 38
End: 2023-02-14

## 2023-02-14 VITALS
HEART RATE: 95 BPM | DIASTOLIC BLOOD PRESSURE: 76 MMHG | SYSTOLIC BLOOD PRESSURE: 107 MMHG | TEMPERATURE: 98.2 F | OXYGEN SATURATION: 98 % | RESPIRATION RATE: 16 BRPM

## 2023-02-14 DIAGNOSIS — Z09 POSTOP CHECK: ICD-10-CM

## 2023-02-14 DIAGNOSIS — N89.8 VAGINAL DISCHARGE: Primary | ICD-10-CM

## 2023-02-14 PROCEDURE — 99024 POSTOP FOLLOW-UP VISIT: CPT | Performed by: NURSE PRACTITIONER

## 2023-02-14 NOTE — PROGRESS NOTES
2/14/2023       HPI:     Name: Theresa Mtz  YOB: 1985    CC: Patient is a 40 y.o. presenting for post op evaluation. HPI: How long have you had this problem? Please rate the severity of your problem: moderate  Anything make it better? Taking elavil, no real notable difference after 4 days but will continue  Hematoma remains uncomfortable  She is able to work from home   Noting vaginal discharge, no odor    Ob/Gyn History:    OB History   No obstetric history on file. Past Medical History:   Past Medical History:   Diagnosis Date    Anxiety     Depression     Hepatitis C     asymptomatic    IV drug abuse ([facility] [address]     CLEAN SINCE 2013    Ovarian cyst     Restless leg syndrome     Urinary incontinence 11/01/2022    Wears glasses      Past Surgical History:   Past Surgical History:   Procedure Laterality Date    CYSTOSCOPY N/A 11/09/2022    CYSTOSCOPY performed by Fransico Stubbs MD at [address] [place] [address]  02/10/2016    diagnostic laparoscopy, KTP laser of endometriosis     TUBAL LIGATION      URETHRAL SURGERY N/A 11/09/2022    RETROPUBIC SLING performed by Fransico Stubbs MD at [facility] 1/30/2023    REMOVAL OF SUBURETHRAL SLING, VAGINAL EXPLORATION, ABDOMINAL EXPLORATION performed by Fransico Stubbs MD at [address] [place] [address]:    Allergies   Allergen Reactions    [place] Rash     PER ALLERGY TESTING    Nickel Rash     PER ALLERGY TESTING    Other Itching and Rash     MESH FROM SLING    Penicillins Hives     Current Medications:  Current Outpatient Medications   Medication Sig Dispense Refill    amitriptyline (ELAVIL) 25 MG tablet Take 1 tablet by mouth nightly 30 tablet 1    ibuprofen (ADVIL;MOTRIN) 600 MG tablet Take 1 tablet by mouth three times daily for 10 days 30 tablet 0    pantoprazole (PROTONIX) 40 MG tablet Take 1 tablet by mouth daily 30 tablet 3    dicyclomine (BENTYL) 10 MG capsule Take 2 capsules by mouth 3 times daily (before meals) for 5 days 120 capsule 3     No current facility-administered medications for this visit. Social History:   Social History     Socioeconomic History    Marital status: Legally      Spouse name: Not on file    Number of children: Not on file    Years of education: Not on file    Highest education level: Not on file   Occupational History    Not on file   Tobacco Use    Smoking status: Former     Packs/day: 0.25     Years: 20.00     Pack years: 5.00     Types: Cigarettes     Quit date: 12/15/2018     Years since quittin.1    Smokeless tobacco: Never    Tobacco comments:     Not smoking at all now   Vaping Use    Vaping Use: Former    Quit date: 2021    Substances: No Nicotine (twice a day), Flavoring    Devices: Disposable   Substance and Sexual Activity    Alcohol use: No    Drug use: Not Currently     Types: IV     Comment: Been clean since 2013    Sexual activity: Yes     Partners: Male   Other Topics Concern    Not on file   Social History Narrative    Not on file     Social Determinants of Health     Financial Resource Strain: Low Risk     Difficulty of Paying Living Expenses: Not hard at all   Food Insecurity: No Food Insecurity    Worried About [facility] in the Last Year: Never true    [facility] in the Last Year: Never true   Transportation Needs: No Transportation Needs    Lack of Transportation (Medical): No    Lack of Transportation (Non-Medical): No   Physical Activity: Inactive    Days of Exercise per Week: 0 days    Minutes of Exercise per Session: 0 min   Stress: No Stress Concern Present    Feeling of Stress : Only a little   Social Connections:  Moderately Isolated    Frequency of Communication with Friends and Family: More than three times a week    Frequency of Social Gatherings with Friends and Family: More than three times a week    Attends Taoism Services: Never    Active Member of Clubs or Organizations: No    Attends Club or Organization Meetings: Never Marital Status: Living with partner   Intimate Partner Violence: Not At Risk    Fear of Current or Ex-Partner: No    Emotionally Abused: No    Physically Abused: No    Sexually Abused: No   Housing Stability: Low Risk     Unable to Pay for Housing in the Last Year: No    Number of Places Lived in the Last Year: 1    Unstable Housing in the Last Year: No     Family History:   Family History   Problem Relation Age of Onset    Substance Abuse Mother     Mental Illness Mother     Suicide Mother     No Known Problems Father     Heart Disease Maternal Grandmother     Emphysema Maternal Grandmother     Diabetes Maternal Grandmother     Diabetes Maternal Grandfather     Heart Disease Paternal Grandmother     Pacemaker Paternal Grandmother     Diabetes Paternal Grandfather          Objective:     Vitals  Vitals:    02/14/23 1531   BP: 107/76   Pulse: 95   Resp: 16   Temp: 98.2 °F (36.8 °C)   SpO2: 98%     Physical Exam  Physical Exam  Vitals and nursing note reviewed. Constitutional:       Appearance: Normal appearance. HENT:      Head: Normocephalic. Eyes:      Conjunctiva/sclera: Conjunctivae normal.   Cardiovascular:      Rate and Rhythm: Normal rate. Pulmonary:      Effort: Pulmonary effort is normal.   Genitourinary:     Vagina: Vaginal discharge present. Musculoskeletal:         General: Normal range of motion. Cervical back: Normal range of motion. Skin:     General: Skin is warm and dry. Neurological:      General: No focal deficit present. Mental Status: She is alert. Psychiatric:         Mood and Affect: Mood normal.         Behavior: Behavior normal.       No results found for this visit on 02/14/23. Assessment/Plan:     Austen Joyce is a 40 y.o. female with   1. Vaginal discharge    2. Postop check      -post operative hematoma:  improving. Continues to be uncomfortable as expected.   Patient will continue Elavil  Itching and rash resolved  She will follow up in 4 weeks for post op  Restrictions reviewed  LINA Olson CNP    Orders Placed This Encounter   Procedures    Vaginal Pathogens Probes *A       No orders of the defined types were placed in this encounter.       LINA Olson CNP

## 2023-02-15 LAB
CANDIDA SPECIES, DNA PROBE: NORMAL
GARDNERELLA VAGINALIS, DNA PROBE: NORMAL
TRICHOMONAS VAGINALIS DNA: NORMAL

## 2023-03-06 ENCOUNTER — CARE COORDINATION (OUTPATIENT)
Dept: OTHER | Facility: CLINIC | Age: 38
End: 2023-03-06

## 2023-03-06 NOTE — CARE COORDINATION
ACM attempted to reach patient for Care transitions call. HIPAA compliant message left requesting a return phone call at patients convenience. Will continue to follow.

## 2023-05-19 ENCOUNTER — TELEMEDICINE (OUTPATIENT)
Dept: FAMILY MEDICINE CLINIC | Age: 38
End: 2023-05-19
Payer: COMMERCIAL

## 2023-05-19 DIAGNOSIS — F11.11 HISTORY OF HEROIN ABUSE (HCC): ICD-10-CM

## 2023-05-19 DIAGNOSIS — R63.5 WEIGHT GAIN: Primary | ICD-10-CM

## 2023-05-19 DIAGNOSIS — B18.2 CHRONIC HEPATITIS C WITHOUT HEPATIC COMA (HCC): ICD-10-CM

## 2023-05-19 DIAGNOSIS — E66.09 CLASS 1 OBESITY DUE TO EXCESS CALORIES WITH SERIOUS COMORBIDITY AND BODY MASS INDEX (BMI) OF 33.0 TO 33.9 IN ADULT: ICD-10-CM

## 2023-05-19 PROCEDURE — 99212 OFFICE O/P EST SF 10 MIN: CPT | Performed by: NURSE PRACTITIONER

## 2023-05-19 ASSESSMENT — ENCOUNTER SYMPTOMS
DIARRHEA: 0
COUGH: 0
SHORTNESS OF BREATH: 0
NAUSEA: 0
VOMITING: 0

## 2023-05-19 NOTE — PROGRESS NOTES
Dexter Toth  : 1985  Encounter date: 2023    Dexter Toth is being seen Virtually using My Chart. Patient is at home and LINA Merlos is at the office. Vitals are patient reported. Chief Complaint   Patient presents with    Weight Loss     History of present illness:    HPI   Presents virtually with concerns for weight loss. Recently dx with liver issues after she had some complications from her surgery. Reports she has been making diet modifications - stopped all fried foods, decreased salt intake, drinking lots of water. Recently started with regular exercise - walking multiple times per week. Stopped drinking alcohol. Has been in recovery from IV drug abuse - heroin - 2013. Recently had a sling placed and removed due to allergy to material - was in hospital for recovery. Is being treated for her Hep C - she is currently using Epclusa - also has signs of cirrhosis and fatty liver  - following with a Liver Specialist.     Allergies   Allergen Reactions    [place] Rash     PER ALLERGY TESTING    Nickel Rash     PER ALLERGY TESTING    Other Itching and Rash     MESH FROM SLING    Penicillins Hives     Current Outpatient Medications   Medication Sig Dispense Refill    amitriptyline (ELAVIL) 25 MG tablet Take 1 tablet by mouth nightly (Patient not taking: Reported on 2023) 30 tablet 1    ibuprofen (ADVIL;MOTRIN) 600 MG tablet Take 1 tablet by mouth three times daily for 10 days 30 tablet 0    dicyclomine (BENTYL) 10 MG capsule Take 2 capsules by mouth 3 times daily (before meals) for 5 days 120 capsule 3    pantoprazole (PROTONIX) 40 MG tablet Take 1 tablet by mouth daily 30 tablet 3     No current facility-administered medications for this visit. Review of Systems   Constitutional:  Negative for activity change, appetite change, chills, fatigue and fever. Respiratory:  Negative for cough and shortness of breath.     Cardiovascular:  Negative for chest pain and

## 2023-05-25 ENCOUNTER — PATIENT MESSAGE (OUTPATIENT)
Dept: FAMILY MEDICINE CLINIC | Age: 38
End: 2023-05-25

## 2023-05-25 DIAGNOSIS — E66.09 CLASS 1 OBESITY DUE TO EXCESS CALORIES WITH SERIOUS COMORBIDITY AND BODY MASS INDEX (BMI) OF 33.0 TO 33.9 IN ADULT: Primary | ICD-10-CM

## 2023-05-25 NOTE — TELEPHONE ENCOUNTER
From: Trip Turner  To: Claudy Robison  Sent: 5/25/2023 3:45 PM EDT  Subject: Florin Fleming I spoke with my insurance company and I got on [employer] both of them told me that you would need to send in a prior authorization for a prescription for wegovy 0.5 in order for me to see how much it would cost. Can you please do that for me as wegovy its just the weight loss drug. It is not for diabetes. Thank you please let me know.

## 2023-05-26 RX ORDER — SEMAGLUTIDE 0.25 MG/.5ML
0.25 INJECTION, SOLUTION SUBCUTANEOUS
Qty: 2 ML | Refills: 0 | Status: SHIPPED | OUTPATIENT
Start: 2023-05-26 | End: 2023-06-23

## 2023-05-30 ENCOUNTER — TELEPHONE (OUTPATIENT)
Dept: FAMILY MEDICINE CLINIC | Age: 38
End: 2023-05-30

## 2023-05-30 NOTE — TELEPHONE ENCOUNTER
Submitted PA for [place] Via CMM Key: [id number] STATUS: PENDING. Follow up done daily; if no response in three days we will refax for status check. If another three days goes by with no response we will call the insurance for status.

## 2023-05-30 NOTE — TELEPHONE ENCOUNTER
Please start PA on:       Semaglutide-Weight Management (WEGOVY) 0.25 MG/0.5ML SOAJ SC injection      Dx: G27.75    Thanks.

## 2023-05-31 NOTE — TELEPHONE ENCOUNTER
The medication is APPROVED. 5/30/2023- 12/30/2023. If this requires a response please respond to the pool ( [facility] [address]). Thank you please advise patient.

## 2023-08-23 ENCOUNTER — OFFICE VISIT (OUTPATIENT)
Dept: FAMILY MEDICINE CLINIC | Age: 38
End: 2023-08-23
Payer: COMMERCIAL

## 2023-08-23 VITALS
HEIGHT: 64 IN | SYSTOLIC BLOOD PRESSURE: 112 MMHG | WEIGHT: 176 LBS | HEART RATE: 81 BPM | TEMPERATURE: 98.6 F | OXYGEN SATURATION: 100 % | DIASTOLIC BLOOD PRESSURE: 70 MMHG | BODY MASS INDEX: 30.05 KG/M2

## 2023-08-23 DIAGNOSIS — Z00.00 ANNUAL PHYSICAL EXAM: Primary | ICD-10-CM

## 2023-08-23 DIAGNOSIS — Z86.32 PERSONAL HISTORY OF GESTATIONAL DIABETES: ICD-10-CM

## 2023-08-23 DIAGNOSIS — Z13.220 SCREENING FOR HYPERLIPIDEMIA: ICD-10-CM

## 2023-08-23 DIAGNOSIS — F11.11 HISTORY OF HEROIN ABUSE (HCC): ICD-10-CM

## 2023-08-23 DIAGNOSIS — F41.9 ANXIETY: ICD-10-CM

## 2023-08-23 DIAGNOSIS — B18.2 CHRONIC HEPATITIS C WITHOUT HEPATIC COMA (HCC): ICD-10-CM

## 2023-08-23 DIAGNOSIS — E55.9 VITAMIN D DEFICIENCY: ICD-10-CM

## 2023-08-23 DIAGNOSIS — R79.89 ABNORMAL CBC: ICD-10-CM

## 2023-08-23 DIAGNOSIS — G25.81 RLS (RESTLESS LEGS SYNDROME): ICD-10-CM

## 2023-08-23 DIAGNOSIS — R63.5 WEIGHT GAIN: ICD-10-CM

## 2023-08-23 DIAGNOSIS — F32.2 CURRENT SEVERE EPISODE OF MAJOR DEPRESSIVE DISORDER WITHOUT PSYCHOTIC FEATURES WITHOUT PRIOR EPISODE (HCC): ICD-10-CM

## 2023-08-23 DIAGNOSIS — N81.10 ACQUIRED FEMALE BLADDER PROLAPSE: ICD-10-CM

## 2023-08-23 PROCEDURE — 99395 PREV VISIT EST AGE 18-39: CPT | Performed by: NURSE PRACTITIONER

## 2023-08-23 RX ORDER — PHENTERMINE HYDROCHLORIDE 37.5 MG/1
37.5 TABLET ORAL
COMMUNITY

## 2023-08-23 RX ORDER — LANOLIN ALCOHOL/MO/W.PET/CERES
5 CREAM (GRAM) TOPICAL DAILY
COMMUNITY

## 2023-08-23 RX ORDER — ROPINIROLE 0.5 MG/1
0.5 TABLET, FILM COATED ORAL PRN
COMMUNITY

## 2023-08-23 ASSESSMENT — PATIENT HEALTH QUESTIONNAIRE - PHQ9
3. TROUBLE FALLING OR STAYING ASLEEP: 0
SUM OF ALL RESPONSES TO PHQ QUESTIONS 1-9: 0
SUM OF ALL RESPONSES TO PHQ QUESTIONS 1-9: 0
10. IF YOU CHECKED OFF ANY PROBLEMS, HOW DIFFICULT HAVE THESE PROBLEMS MADE IT FOR YOU TO DO YOUR WORK, TAKE CARE OF THINGS AT HOME, OR GET ALONG WITH OTHER PEOPLE: 0
2. FEELING DOWN, DEPRESSED OR HOPELESS: 0
6. FEELING BAD ABOUT YOURSELF - OR THAT YOU ARE A FAILURE OR HAVE LET YOURSELF OR YOUR FAMILY DOWN: 0
SUM OF ALL RESPONSES TO PHQ9 QUESTIONS 1 & 2: 0
8. MOVING OR SPEAKING SO SLOWLY THAT OTHER PEOPLE COULD HAVE NOTICED. OR THE OPPOSITE, BEING SO FIGETY OR RESTLESS THAT YOU HAVE BEEN MOVING AROUND A LOT MORE THAN USUAL: 0
SUM OF ALL RESPONSES TO PHQ QUESTIONS 1-9: 0
7. TROUBLE CONCENTRATING ON THINGS, SUCH AS READING THE NEWSPAPER OR WATCHING TELEVISION: 0
SUM OF ALL RESPONSES TO PHQ QUESTIONS 1-9: 0
SUM OF ALL RESPONSES TO PHQ QUESTIONS 1-9: 0
5. POOR APPETITE OR OVEREATING: 0
1. LITTLE INTEREST OR PLEASURE IN DOING THINGS: 0
2. FEELING DOWN, DEPRESSED OR HOPELESS: 0
4. FEELING TIRED OR HAVING LITTLE ENERGY: 0
SUM OF ALL RESPONSES TO PHQ QUESTIONS 1-9: 0
SUM OF ALL RESPONSES TO PHQ9 QUESTIONS 1 & 2: 0
1. LITTLE INTEREST OR PLEASURE IN DOING THINGS: 0
9. THOUGHTS THAT YOU WOULD BE BETTER OFF DEAD, OR OF HURTING YOURSELF: 0

## 2023-08-23 NOTE — PROGRESS NOTES
History and Physical     Claribel Covington [facility]   YOB: 1985    Date of Service: 8/23/2023     Chief Complaint: Serafin Sparks is a 45 y.o. female who presents for a complete physical examination. HPI: Presents to clinic today for annual physical exam and follow up on chronic health conditions. Obesity  Working with [facility] - has been going well - has lost 8 lbs since starting. Started at 188lbs. Working diet and exercise. Feels it has been going well. Hep C  Worked with GI and completed treatment at the end of July - has follow up labs scheduled soon. Will have EGD completed. Following with [address] [facility]/Dr. Biju Oconnor. Urinary incontinence/Complications with bladder sling  Had bladder sling removed in January 2023 - since removal restarted with urinary incontinence. Is still healing - still has numbness. The hope is that over the next year things will improve - if not next step would be using cadaver thigh tissue to see if that helps. Doesn't plan to have anymore surgeries anytime soon. RLS  Continues with ropinirole PRN. Takes approximately 3-4 times per month. Will only take if she awakens over night. Anxiety/Depression  Has been doing okay. Diet: Good - eating fruits and vegetables. Limits junk and sugar. Exercise: walks 7 time(s) per week  Occupation: Working from home -  - full time - loves job. Hobbies: Riding motorcycles. Family life: Dating, 3 children, no grandchildren, 4 dogs, 1 turtle, lives in house, low stress. No concerns for anxiety or depression. Self breast exam: No  Last eye exam: 2022 - wears glasses only.    Last dental exam: 2022    Preventative Care:  Health Maintenance   Topic Date Due    Hepatitis A vaccine (1 of 2 - Risk 2-dose series) Never done    Varicella vaccine (1 of 2 - 2-dose childhood series) Never done    Pneumococcal 0-64 years Vaccine (1 - PCV) Never done    Hepatitis B vaccine (1

## 2024-01-25 ENCOUNTER — PATIENT MESSAGE (OUTPATIENT)
Dept: FAMILY MEDICINE CLINIC | Age: 39
End: 2024-01-25

## 2024-01-25 DIAGNOSIS — J34.0 CELLULITIS OF EXTERNAL NOSE: Primary | ICD-10-CM

## 2024-01-25 NOTE — TELEPHONE ENCOUNTER
From: Claribel Myers  To: Roxanne CLEMENTE Pedro  Sent: 1/25/2024 2:38 PM EST  Subject: Possible infection on nose    Hello. I had surgery on 1/16/24 I had my ovaries and my tubes removed so I am unable to drive to your office. However I think I have a infection on the tip of my nose. It's been there since Monday the 22nd. It feels like there is pressure that needs released. Can I please get an antibiotic called in to the [facility]?

## 2024-02-08 ENCOUNTER — PATIENT MESSAGE (OUTPATIENT)
Dept: FAMILY MEDICINE CLINIC | Age: 39
End: 2024-02-08

## 2024-02-08 DIAGNOSIS — E55.9 VITAMIN D DEFICIENCY: Primary | ICD-10-CM

## 2024-02-09 NOTE — TELEPHONE ENCOUNTER
From: Claribel Myers  To: Roxanne CLEMENTE Pedro  Sent: 2/8/2024 7:46 PM EST  Subject: Supplements    Hi Roxanne,  As you know I have my ovaries and tubes removed on 1/16/24, my GYNO is wanting me to start Calcium and vitamin D. Can I get blood work to check my Calcium levels before I start this I don't want to have to much in my body and cause other things to happen if not necessary. Please call me so we can discuss further. [phone].    Thank you  Sherita

## 2024-02-14 DIAGNOSIS — G25.81 RLS (RESTLESS LEGS SYNDROME): ICD-10-CM

## 2024-02-14 RX ORDER — ROPINIROLE 0.25 MG/1
0.25 TABLET, FILM COATED ORAL NIGHTLY
Qty: 60 TABLET | Refills: 0 | OUTPATIENT
Start: 2024-02-14

## 2024-02-14 RX ORDER — ASPIRIN 81 MG
1 TABLET, DELAYED RELEASE (ENTERIC COATED) ORAL DAILY
Qty: 90 TABLET | Refills: 1 | Status: SHIPPED | OUTPATIENT
Start: 2024-02-14

## 2024-02-14 NOTE — TELEPHONE ENCOUNTER
Made follow up phone call to patient.  Per patient she is experiencing bilateral hand and foot joint pain along with difficulty sleeping at night.  This started approximately 4 days prior and have been progressing.  Did recently have her tubes and ovaries removed due to severe Endometriosis January 16th.  Since surgery she has been having hot flashes and night sweats.  She is working with her GYN on a plan.  Would like to see if the Calcium + Vit D supplement will help.  Will send prescription.  Otherwise she plans to start Collagen and possibly Estroven.  May consider Veozah for her hot flashes.

## 2024-02-16 ENCOUNTER — TELEPHONE (OUTPATIENT)
Dept: FAMILY MEDICINE CLINIC | Age: 39
End: 2024-02-16

## 2024-02-16 NOTE — TELEPHONE ENCOUNTER
Advised pharmacy that it was okay to change medication. Pharmacy stated they will dispense the calcium + Vitamin D3 600-10 mg instead of the Calcium + Vitamin D3 600-5 mg.

## 2024-02-16 NOTE — TELEPHONE ENCOUNTER
[facility] called and stated they can't not get the dosage for the calcium + vitamin d3 600-5mg in, they would like to know if 600-10mg is okay?    Please Advise

## 2024-02-20 ENCOUNTER — PATIENT MESSAGE (OUTPATIENT)
Dept: FAMILY MEDICINE CLINIC | Age: 39
End: 2024-02-20

## 2024-02-20 RX ORDER — ESTRADIOL 1 MG/1
1 TABLET ORAL DAILY
COMMUNITY
Start: 2024-02-19

## 2024-02-20 RX ORDER — MEDROXYPROGESTERONE ACETATE 2.5 MG/1
2.5 TABLET ORAL DAILY
COMMUNITY
Start: 2024-02-19

## 2024-02-20 NOTE — TELEPHONE ENCOUNTER
From: Claribel Myers  To: Roxanne CLEMENTE Pedro  Sent: 2/20/2024 7:59 AM EST  Subject: Medication    Good morning Roxanne,   I just wanted to let you know that my gynecologist started me on Estradiol 1mg and Medroxyprogesterone 2.5mg. He said I had to try these before my insurance will cover the one you suggested.

## 2024-09-19 ENCOUNTER — OFFICE VISIT (OUTPATIENT)
Dept: FAMILY MEDICINE CLINIC | Age: 39
End: 2024-09-19
Payer: COMMERCIAL

## 2024-09-19 VITALS
BODY MASS INDEX: 28.1 KG/M2 | TEMPERATURE: 98 F | OXYGEN SATURATION: 97 % | HEART RATE: 94 BPM | WEIGHT: 164.6 LBS | DIASTOLIC BLOOD PRESSURE: 76 MMHG | SYSTOLIC BLOOD PRESSURE: 110 MMHG | HEIGHT: 64 IN

## 2024-09-19 DIAGNOSIS — Z72.0 TOBACCO USE: ICD-10-CM

## 2024-09-19 DIAGNOSIS — B18.2 CHRONIC HEPATITIS C WITHOUT HEPATIC COMA (HCC): ICD-10-CM

## 2024-09-19 DIAGNOSIS — G25.81 RLS (RESTLESS LEGS SYNDROME): ICD-10-CM

## 2024-09-19 DIAGNOSIS — F41.9 ANXIETY: ICD-10-CM

## 2024-09-19 DIAGNOSIS — E66.09 CLASS 1 OBESITY DUE TO EXCESS CALORIES WITH SERIOUS COMORBIDITY AND BODY MASS INDEX (BMI) OF 33.0 TO 33.9 IN ADULT: ICD-10-CM

## 2024-09-19 DIAGNOSIS — Z13.1 SCREENING FOR DIABETES MELLITUS: ICD-10-CM

## 2024-09-19 DIAGNOSIS — Z13.220 SCREENING FOR HYPERLIPIDEMIA: ICD-10-CM

## 2024-09-19 DIAGNOSIS — Z00.00 ANNUAL PHYSICAL EXAM: Primary | ICD-10-CM

## 2024-09-19 DIAGNOSIS — R32 URINARY INCONTINENCE, UNSPECIFIED TYPE: ICD-10-CM

## 2024-09-19 DIAGNOSIS — L98.9 FACIAL LESION: ICD-10-CM

## 2024-09-19 PROCEDURE — 99395 PREV VISIT EST AGE 18-39: CPT | Performed by: NURSE PRACTITIONER

## 2024-09-19 RX ORDER — ROPINIROLE 0.5 MG/1
0.5 TABLET, FILM COATED ORAL PRN
Qty: 90 TABLET | Refills: 0 | Status: SHIPPED | OUTPATIENT
Start: 2024-09-19

## 2024-09-19 SDOH — ECONOMIC STABILITY: FOOD INSECURITY: WITHIN THE PAST 12 MONTHS, THE FOOD YOU BOUGHT JUST DIDN'T LAST AND YOU DIDN'T HAVE MONEY TO GET MORE.: NEVER TRUE

## 2024-09-19 SDOH — ECONOMIC STABILITY: FOOD INSECURITY: WITHIN THE PAST 12 MONTHS, YOU WORRIED THAT YOUR FOOD WOULD RUN OUT BEFORE YOU GOT MONEY TO BUY MORE.: NEVER TRUE

## 2024-09-19 SDOH — ECONOMIC STABILITY: INCOME INSECURITY: HOW HARD IS IT FOR YOU TO PAY FOR THE VERY BASICS LIKE FOOD, HOUSING, MEDICAL CARE, AND HEATING?: NOT HARD AT ALL

## 2024-09-19 ASSESSMENT — PATIENT HEALTH QUESTIONNAIRE - PHQ9
SUM OF ALL RESPONSES TO PHQ QUESTIONS 1-9: 0
7. TROUBLE CONCENTRATING ON THINGS, SUCH AS READING THE NEWSPAPER OR WATCHING TELEVISION: NOT AT ALL
6. FEELING BAD ABOUT YOURSELF - OR THAT YOU ARE A FAILURE OR HAVE LET YOURSELF OR YOUR FAMILY DOWN: NOT AT ALL
1. LITTLE INTEREST OR PLEASURE IN DOING THINGS: NOT AT ALL
8. MOVING OR SPEAKING SO SLOWLY THAT OTHER PEOPLE COULD HAVE NOTICED. OR THE OPPOSITE, BEING SO FIGETY OR RESTLESS THAT YOU HAVE BEEN MOVING AROUND A LOT MORE THAN USUAL: NOT AT ALL
SUM OF ALL RESPONSES TO PHQ QUESTIONS 1-9: 0
SUM OF ALL RESPONSES TO PHQ9 QUESTIONS 1 & 2: 0
5. POOR APPETITE OR OVEREATING: NOT AT ALL
10. IF YOU CHECKED OFF ANY PROBLEMS, HOW DIFFICULT HAVE THESE PROBLEMS MADE IT FOR YOU TO DO YOUR WORK, TAKE CARE OF THINGS AT HOME, OR GET ALONG WITH OTHER PEOPLE: NOT DIFFICULT AT ALL
4. FEELING TIRED OR HAVING LITTLE ENERGY: NOT AT ALL
SUM OF ALL RESPONSES TO PHQ QUESTIONS 1-9: 0
9. THOUGHTS THAT YOU WOULD BE BETTER OFF DEAD, OR OF HURTING YOURSELF: NOT AT ALL
SUM OF ALL RESPONSES TO PHQ QUESTIONS 1-9: 0
3. TROUBLE FALLING OR STAYING ASLEEP: NOT AT ALL
2. FEELING DOWN, DEPRESSED OR HOPELESS: NOT AT ALL

## 2024-10-28 ENCOUNTER — PATIENT MESSAGE (OUTPATIENT)
Dept: FAMILY MEDICINE CLINIC | Age: 39
End: 2024-10-28

## 2024-12-23 DIAGNOSIS — Z13.1 SCREENING FOR DIABETES MELLITUS: ICD-10-CM

## 2024-12-23 DIAGNOSIS — Z00.00 ANNUAL PHYSICAL EXAM: ICD-10-CM

## 2024-12-23 DIAGNOSIS — Z13.220 SCREENING FOR HYPERLIPIDEMIA: ICD-10-CM

## 2024-12-23 LAB
ALBUMIN SERPL-MCNC: 4.5 G/DL (ref 3.4–5)
ALBUMIN/GLOB SERPL: 1.6 {RATIO} (ref 1.1–2.2)
ALP SERPL-CCNC: 123 U/L (ref 40–129)
ALT SERPL-CCNC: 31 U/L (ref 10–40)
ANION GAP SERPL CALCULATED.3IONS-SCNC: 12 MMOL/L (ref 3–16)
AST SERPL-CCNC: 28 U/L (ref 15–37)
BILIRUB SERPL-MCNC: 0.3 MG/DL (ref 0–1)
BUN SERPL-MCNC: 13 MG/DL (ref 7–20)
CALCIUM SERPL-MCNC: 10.1 MG/DL (ref 8.3–10.6)
CHLORIDE SERPL-SCNC: 104 MMOL/L (ref 99–110)
CHOLEST SERPL-MCNC: 155 MG/DL (ref 0–199)
CO2 SERPL-SCNC: 25 MMOL/L (ref 21–32)
CREAT SERPL-MCNC: 0.8 MG/DL (ref 0.6–1.1)
GFR SERPLBLD CREATININE-BSD FMLA CKD-EPI: >90 ML/MIN/{1.73_M2}
GLUCOSE P FAST SERPL-MCNC: 78 MG/DL (ref 70–99)
HDLC SERPL-MCNC: 39 MG/DL (ref 40–60)
LDL CHOLESTEROL: 100 MG/DL
POTASSIUM SERPL-SCNC: 4.2 MMOL/L (ref 3.5–5.1)
PROT SERPL-MCNC: 7.3 G/DL (ref 6.4–8.2)
SODIUM SERPL-SCNC: 141 MMOL/L (ref 136–145)
TRIGL SERPL-MCNC: 78 MG/DL (ref 0–150)
VLDLC SERPL CALC-MCNC: 16 MG/DL

## 2024-12-26 ENCOUNTER — TELEPHONE (OUTPATIENT)
Dept: FAMILY MEDICINE CLINIC | Age: 39
End: 2024-12-26

## 2025-01-02 ENCOUNTER — PATIENT MESSAGE (OUTPATIENT)
Dept: FAMILY MEDICINE CLINIC | Age: 40
End: 2025-01-02

## 2025-01-03 ENCOUNTER — OFFICE VISIT (OUTPATIENT)
Dept: FAMILY MEDICINE CLINIC | Age: 40
End: 2025-01-03

## 2025-01-03 VITALS
DIASTOLIC BLOOD PRESSURE: 72 MMHG | WEIGHT: 168 LBS | TEMPERATURE: 98.7 F | BODY MASS INDEX: 29.06 KG/M2 | OXYGEN SATURATION: 95 % | HEART RATE: 100 BPM | SYSTOLIC BLOOD PRESSURE: 106 MMHG

## 2025-01-03 DIAGNOSIS — Z23 NEED FOR VACCINATION: ICD-10-CM

## 2025-01-03 DIAGNOSIS — D22.9 CHANGE IN MOLE: ICD-10-CM

## 2025-01-03 DIAGNOSIS — R21 RASH: Primary | ICD-10-CM

## 2025-01-03 PROCEDURE — 90739 HEPB VACC 2/4 DOSE ADULT IM: CPT | Performed by: STUDENT IN AN ORGANIZED HEALTH CARE EDUCATION/TRAINING PROGRAM

## 2025-01-03 PROCEDURE — 99214 OFFICE O/P EST MOD 30 MIN: CPT | Performed by: STUDENT IN AN ORGANIZED HEALTH CARE EDUCATION/TRAINING PROGRAM

## 2025-01-03 PROCEDURE — 90471 IMMUNIZATION ADMIN: CPT | Performed by: STUDENT IN AN ORGANIZED HEALTH CARE EDUCATION/TRAINING PROGRAM

## 2025-01-03 RX ORDER — CLOBETASOL PROPIONATE 0.5 MG/G
CREAM TOPICAL
Qty: 15 G | Refills: 0 | Status: SHIPPED | OUTPATIENT
Start: 2025-01-03

## 2025-01-03 ASSESSMENT — PATIENT HEALTH QUESTIONNAIRE - PHQ9
4. FEELING TIRED OR HAVING LITTLE ENERGY: NOT AT ALL
7. TROUBLE CONCENTRATING ON THINGS, SUCH AS READING THE NEWSPAPER OR WATCHING TELEVISION: NOT AT ALL
10. IF YOU CHECKED OFF ANY PROBLEMS, HOW DIFFICULT HAVE THESE PROBLEMS MADE IT FOR YOU TO DO YOUR WORK, TAKE CARE OF THINGS AT HOME, OR GET ALONG WITH OTHER PEOPLE: NOT DIFFICULT AT ALL
SUM OF ALL RESPONSES TO PHQ QUESTIONS 1-9: 0
2. FEELING DOWN, DEPRESSED OR HOPELESS: NOT AT ALL
3. TROUBLE FALLING OR STAYING ASLEEP: NOT AT ALL
8. MOVING OR SPEAKING SO SLOWLY THAT OTHER PEOPLE COULD HAVE NOTICED. OR THE OPPOSITE, BEING SO FIGETY OR RESTLESS THAT YOU HAVE BEEN MOVING AROUND A LOT MORE THAN USUAL: NOT AT ALL
6. FEELING BAD ABOUT YOURSELF - OR THAT YOU ARE A FAILURE OR HAVE LET YOURSELF OR YOUR FAMILY DOWN: NOT AT ALL
SUM OF ALL RESPONSES TO PHQ9 QUESTIONS 1 & 2: 0
5. POOR APPETITE OR OVEREATING: NOT AT ALL
SUM OF ALL RESPONSES TO PHQ QUESTIONS 1-9: 0
1. LITTLE INTEREST OR PLEASURE IN DOING THINGS: NOT AT ALL
SUM OF ALL RESPONSES TO PHQ QUESTIONS 1-9: 0
SUM OF ALL RESPONSES TO PHQ QUESTIONS 1-9: 0
9. THOUGHTS THAT YOU WOULD BE BETTER OFF DEAD, OR OF HURTING YOURSELF: NOT AT ALL

## 2025-01-03 NOTE — PROGRESS NOTES
Office Note  1/3/2025  Patient Name: Claribel Myers  MRN: [id number] : 1985    SUBJECTIVE:     CHIEF COMPLAINT:  Chief Complaint   Patient presents with    Rash     Rash all over body for about a month, noticed that it looks like what is on her left foot. Thinks it may be psoriasis.        HISTORY OF PRESENT ILLNESS:  She presents today with the following concerns:    Rash:  - Ongoing for about 1 month. Spots along back/side of neck, chest, hip and foot. Used aunt's psoriasis cream (clobetasol) with improvement of foot lesion previously. Has not tried this for other lesions. Lesions itch. Has tried HC without improvement.    Has abnormal mole on face previously referred to derm but never saw    Liver doctor requesting she get hepatitis B vaccine    Past Medical History:  Past Medical History:   Diagnosis Date    Anxiety     Depression     Hepatitis C     asymptomatic    IV drug abuse ([facility])     CLEAN SINCE     Ovarian cyst     Restless leg syndrome     Urinary incontinence 2022    Wears glasses      Past Surgical History:  Past Surgical History:   Procedure Laterality Date    CYSTOSCOPY N/A 2022    CYSTOSCOPY performed by Evangelista Rubio MD at [facility]    LAPAROSCOPY  02/10/2016    diagnostic laparoscopy, KTP laser of endometriosis     TUBAL LIGATION      URETHRAL SURGERY N/A 2022    RETROPUBIC SLING performed by Evangelista Rubio MD at [facility]    URETHRAL SURGERY N/A 2023    REMOVAL OF SUBURETHRAL SLING, VAGINAL EXPLORATION, ABDOMINAL EXPLORATION performed by Evangelista Rubio MD at [facility]     Medications:  Current Outpatient Medications   Medication Sig Dispense Refill    clobetasol (TEMOVATE) 0.05 % cream Apply topically  daily. 15 g 0    rOPINIRole (REQUIP) 0.5 MG tablet Take 1 tablet by mouth as needed (restless legs) 90 tablet 0    estradiol (ESTRACE) 1 MG tablet Take 1 tablet by mouth daily      medroxyPROGESTERone (PROVERA) 2.5 MG tablet Take 1 tablet by mouth daily

## 2025-02-03 ENCOUNTER — OFFICE VISIT (OUTPATIENT)
Dept: FAMILY MEDICINE CLINIC | Age: 40
End: 2025-02-03

## 2025-02-03 DIAGNOSIS — Z23 NEED FOR HEPATITIS B VACCINATION: Primary | ICD-10-CM

## 2025-02-03 SDOH — ECONOMIC STABILITY: FOOD INSECURITY: WITHIN THE PAST 12 MONTHS, YOU WORRIED THAT YOUR FOOD WOULD RUN OUT BEFORE YOU GOT MONEY TO BUY MORE.: NEVER TRUE

## 2025-02-03 SDOH — ECONOMIC STABILITY: FOOD INSECURITY: WITHIN THE PAST 12 MONTHS, THE FOOD YOU BOUGHT JUST DIDN'T LAST AND YOU DIDN'T HAVE MONEY TO GET MORE.: NEVER TRUE

## 2025-02-03 NOTE — PROGRESS NOTES
Immunization(s) given during visit:     Immunizations Administered       Name Date Dose Route    Hep B, HEPLISAV-B, (age 18y+), IM, 0.5mL 2/3/2025 0.5 mL Intramuscular    Site: Deltoid- Left    Lot: [id number]    NDC: 51511-269-34             Patient instructed to remain in clinic for 20 minutes after injection and was advised to report any adverse reaction to me immediately.

## 2025-08-04 ENCOUNTER — OFFICE VISIT (OUTPATIENT)
Dept: FAMILY MEDICINE CLINIC | Age: 40
End: 2025-08-04
Payer: MEDICARE

## 2025-08-04 VITALS
DIASTOLIC BLOOD PRESSURE: 80 MMHG | SYSTOLIC BLOOD PRESSURE: 106 MMHG | BODY MASS INDEX: 27.37 KG/M2 | WEIGHT: 158.2 LBS | TEMPERATURE: 97.5 F | HEART RATE: 99 BPM

## 2025-08-04 DIAGNOSIS — E66.09 CLASS 1 OBESITY DUE TO EXCESS CALORIES WITH SERIOUS COMORBIDITY AND BODY MASS INDEX (BMI) OF 33.0 TO 33.9 IN ADULT: ICD-10-CM

## 2025-08-04 DIAGNOSIS — R32 URINARY INCONTINENCE, UNSPECIFIED TYPE: ICD-10-CM

## 2025-08-04 DIAGNOSIS — E66.811 CLASS 1 OBESITY DUE TO EXCESS CALORIES WITH SERIOUS COMORBIDITY AND BODY MASS INDEX (BMI) OF 33.0 TO 33.9 IN ADULT: ICD-10-CM

## 2025-08-04 DIAGNOSIS — E55.9 VITAMIN D DEFICIENCY: Primary | ICD-10-CM

## 2025-08-04 DIAGNOSIS — Z72.0 TOBACCO USE: ICD-10-CM

## 2025-08-04 DIAGNOSIS — L40.8 FLEXURAL PSORIASIS: ICD-10-CM

## 2025-08-04 DIAGNOSIS — L98.9 LESION OF SKIN OF FACE: ICD-10-CM

## 2025-08-04 DIAGNOSIS — F41.9 ANXIETY: ICD-10-CM

## 2025-08-04 PROCEDURE — G2211 COMPLEX E/M VISIT ADD ON: HCPCS | Performed by: NURSE PRACTITIONER

## 2025-08-04 PROCEDURE — 99214 OFFICE O/P EST MOD 30 MIN: CPT | Performed by: NURSE PRACTITIONER

## 2025-08-04 PROCEDURE — G0010 ADMIN HEPATITIS B VACCINE: HCPCS | Performed by: NURSE PRACTITIONER

## 2025-08-04 PROCEDURE — 90739 HEPB VACC 2/4 DOSE ADULT IM: CPT | Performed by: NURSE PRACTITIONER

## 2025-08-04 RX ORDER — CLOBETASOL PROPIONATE 0.5 MG/G
CREAM TOPICAL
Qty: 45 G | Refills: 0 | Status: SHIPPED | OUTPATIENT
Start: 2025-08-04

## 2025-08-04 RX ORDER — ASPIRIN 81 MG
1 TABLET, DELAYED RELEASE (ENTERIC COATED) ORAL DAILY
Qty: 90 TABLET | Refills: 1 | Status: SHIPPED | OUTPATIENT
Start: 2025-08-04

## 2025-08-04 ASSESSMENT — PATIENT HEALTH QUESTIONNAIRE - PHQ9
6. FEELING BAD ABOUT YOURSELF - OR THAT YOU ARE A FAILURE OR HAVE LET YOURSELF OR YOUR FAMILY DOWN: NOT AT ALL
1. LITTLE INTEREST OR PLEASURE IN DOING THINGS: NOT AT ALL
SUM OF ALL RESPONSES TO PHQ QUESTIONS 1-9: 0
7. TROUBLE CONCENTRATING ON THINGS, SUCH AS READING THE NEWSPAPER OR WATCHING TELEVISION: NOT AT ALL
3. TROUBLE FALLING OR STAYING ASLEEP: NOT AT ALL
SUM OF ALL RESPONSES TO PHQ QUESTIONS 1-9: 0
SUM OF ALL RESPONSES TO PHQ QUESTIONS 1-9: 0
9. THOUGHTS THAT YOU WOULD BE BETTER OFF DEAD, OR OF HURTING YOURSELF: NOT AT ALL
4. FEELING TIRED OR HAVING LITTLE ENERGY: NOT AT ALL
10. IF YOU CHECKED OFF ANY PROBLEMS, HOW DIFFICULT HAVE THESE PROBLEMS MADE IT FOR YOU TO DO YOUR WORK, TAKE CARE OF THINGS AT HOME, OR GET ALONG WITH OTHER PEOPLE: NOT DIFFICULT AT ALL
5. POOR APPETITE OR OVEREATING: NOT AT ALL
2. FEELING DOWN, DEPRESSED OR HOPELESS: NOT AT ALL
SUM OF ALL RESPONSES TO PHQ QUESTIONS 1-9: 0
8. MOVING OR SPEAKING SO SLOWLY THAT OTHER PEOPLE COULD HAVE NOTICED. OR THE OPPOSITE, BEING SO FIGETY OR RESTLESS THAT YOU HAVE BEEN MOVING AROUND A LOT MORE THAN USUAL: NOT AT ALL

## 2025-08-04 ASSESSMENT — ENCOUNTER SYMPTOMS
VOMITING: 0
DIARRHEA: 0
COUGH: 0
NAUSEA: 0
SHORTNESS OF BREATH: 0

## (undated) DEVICE — CYSTO/BLADDER IRRIGATION SET, REGULATING CLAMP

## (undated) DEVICE — SOLUTION PREP POVIDONE IOD FOR SKIN MUCOUS MEM PRIOR TO

## (undated) DEVICE — SOLUTION IV IRRIG POUR BRL 0.9% SODIUM CHL 2F7124

## (undated) DEVICE — SOLUTION SCRB 4OZ 10% POVIDONE IOD ANTIMIC BTL

## (undated) DEVICE — SUTURE PROL SZ 2-0 L30IN NONABSORBABLE BLU L26MM SH 1/2 CIR 8833H

## (undated) DEVICE — ELECTRODE PT RET AD L9FT HI MOIST COND ADH HYDRGEL CORDED

## (undated) DEVICE — GAUZE,PACKING STRIP,IODOFORM,2"X5YD,STRL: Brand: CURAD

## (undated) DEVICE — GOWN SIRUS NONREIN XL W/TWL: Brand: MEDLINE INDUSTRIES, INC.

## (undated) DEVICE — DRAPE LAP 104X35X124IN BLU CTRL + FAB REINF ABD FEN

## (undated) DEVICE — SKIN MARKER REGULAR TIP WITH RULER CAP AND LABELS: Brand: DEVON

## (undated) DEVICE — SYRINGE MED 10ML LUERLOCK TIP W/O SFTY DISP

## (undated) DEVICE — SUTURE VCRL + SZ 2-0 L18IN ABSRB VLT L26MM SH 1/2 CIR VCP775D

## (undated) DEVICE — STRIP,CLOSURE,WOUND,MEDI-STRIP,1/4X3: Brand: MEDLINE

## (undated) DEVICE — SYRINGE 20ML LL S/C 50

## (undated) DEVICE — MAJOR SET UP: Brand: MEDLINE INDUSTRIES, INC.

## (undated) DEVICE — SUTURE VCRL + SZ 4-0 L27IN ABSRB UD PS-2 3/8 CIR REV CUT VCP426H

## (undated) DEVICE — SUTURE VCRL + SZ 4-0 L27IN ABSRB WHT FS-2 3/8 CIR REV CUT VCP422H

## (undated) DEVICE — BASIC SINGLE BASIN 1-LF: Brand: MEDLINE INDUSTRIES, INC.

## (undated) DEVICE — NEEDLE HYPO 23GA L1.5IN TURQ POLYPR HUB S STL THN WALL IM

## (undated) DEVICE — CATHETER F BLLN 5CC 16FR 2 W HYDRGEL COAT LESS TRAUM LUB

## (undated) DEVICE — TUBING, SUCTION, 1/4" X 12', STRAIGHT: Brand: MEDLINE

## (undated) DEVICE — DRAINBAG,ANTI-REFLUX TOWER,L/F,2000ML,LL: Brand: MEDLINE

## (undated) DEVICE — 3M™ STERI-STRIP™ COMPOUND BENZOIN TINCTURE 40 BAGS/CARTON 4 CARTONS/CASE C1544: Brand: 3M™ STERI-STRIP™

## (undated) DEVICE — SOLUTION IRRIGATION STRL H2O 1000 ML UROMATIC CONTAINER

## (undated) DEVICE — MERCY HEALTH WEST TURNOVER: Brand: MEDLINE INDUSTRIES, INC.

## (undated) DEVICE — PREMIUM DRY TRAY LF: Brand: MEDLINE INDUSTRIES, INC.

## (undated) DEVICE — BAG DRAINAGE 2 LT STRL ANRFLX LF

## (undated) DEVICE — GLOVE SURG SZ 7.5 L11.73IN FNGR THK9.8MIL STRW LTX POLYMER

## (undated) DEVICE — NEEDLE ENDOSCP 1ML SYR CA HA SIDEKCK RIG INJ COAPTITE

## (undated) DEVICE — SUTURE VCRL + SZ 0 L27IN ABSRB VLT CT-1 1/2 CIR TAPERPOINT VCP340H

## (undated) DEVICE — SUTURE VCRL + SZ 3-0 L27IN ABSRB UD L26MM SH 1/2 CIR VCP416H

## (undated) DEVICE — DRAPE,UNDERBUTTOCKS,PCH,STERILE: Brand: MEDLINE

## (undated) DEVICE — CYSTOSCOPY: Brand: MEDLINE INDUSTRIES, INC.

## (undated) DEVICE — PENCIL ES L3M BTTN SWCH S STL HEX LOK BLDE ELECTRD HOLSTER

## (undated) DEVICE — 1016 S-DRAPE IRRIG POUCH 10/BOX: Brand: STERI-DRAPE™

## (undated) DEVICE — CATHETER URETH 18FR 2CC BLLN SIL ELASTMR F 2 W BARDX

## (undated) DEVICE — SOLUTION IV IRRIG WATER 1000ML POUR BRL 2F7114

## (undated) DEVICE — SOLUTION IRRIG 3000ML STRL H2O USP UROMATIC PLAS CONT